# Patient Record
Sex: FEMALE | Race: BLACK OR AFRICAN AMERICAN | Employment: FULL TIME | URBAN - METROPOLITAN AREA
[De-identification: names, ages, dates, MRNs, and addresses within clinical notes are randomized per-mention and may not be internally consistent; named-entity substitution may affect disease eponyms.]

---

## 2017-04-15 DIAGNOSIS — J30.1 SEASONAL ALLERGIC RHINITIS DUE TO POLLEN: Primary | ICD-10-CM

## 2017-04-15 DIAGNOSIS — J30.9 ALLERGIC RHINITIS: ICD-10-CM

## 2017-04-18 RX ORDER — FLUTICASONE PROPIONATE 50 MCG
2 SPRAY, SUSPENSION (ML) NASAL DAILY
Qty: 1 BOTTLE | Refills: 0 | Status: SHIPPED | OUTPATIENT
Start: 2017-04-18 | End: 2017-05-17 | Stop reason: SDUPTHER

## 2017-05-17 ENCOUNTER — OFFICE VISIT (OUTPATIENT)
Dept: FAMILY MEDICINE CLINIC | Age: 47
End: 2017-05-17

## 2017-05-17 VITALS
HEART RATE: 69 BPM | BODY MASS INDEX: 32.47 KG/M2 | DIASTOLIC BLOOD PRESSURE: 78 MMHG | SYSTOLIC BLOOD PRESSURE: 125 MMHG | TEMPERATURE: 97.2 F | OXYGEN SATURATION: 100 % | WEIGHT: 202 LBS | HEIGHT: 66 IN | RESPIRATION RATE: 18 BRPM

## 2017-05-17 DIAGNOSIS — E11.9 TYPE 2 DIABETES MELLITUS WITHOUT COMPLICATION, WITHOUT LONG-TERM CURRENT USE OF INSULIN (HCC): ICD-10-CM

## 2017-05-17 DIAGNOSIS — I10 ESSENTIAL HYPERTENSION, BENIGN: ICD-10-CM

## 2017-05-17 DIAGNOSIS — L30.9 DERMATITIS: ICD-10-CM

## 2017-05-17 DIAGNOSIS — R42 VERTIGO: ICD-10-CM

## 2017-05-17 DIAGNOSIS — J30.1 SEASONAL ALLERGIC RHINITIS DUE TO POLLEN: ICD-10-CM

## 2017-05-17 DIAGNOSIS — N94.6 DYSMENORRHEA: ICD-10-CM

## 2017-05-17 DIAGNOSIS — E11.9 TYPE 2 DIABETES MELLITUS WITHOUT COMPLICATION, WITHOUT LONG-TERM CURRENT USE OF INSULIN (HCC): Primary | ICD-10-CM

## 2017-05-17 LAB — HBA1C MFR BLD HPLC: 6.5 %

## 2017-05-17 RX ORDER — FLUTICASONE PROPIONATE 50 MCG
2 SPRAY, SUSPENSION (ML) NASAL DAILY
Qty: 1 BOTTLE | Refills: 5 | Status: SHIPPED | OUTPATIENT
Start: 2017-05-17 | End: 2018-08-07 | Stop reason: SDUPTHER

## 2017-05-17 RX ORDER — NAPROXEN 500 MG/1
TABLET ORAL
Qty: 60 TAB | Refills: 5 | Status: SHIPPED | OUTPATIENT
Start: 2017-05-17 | End: 2018-06-28 | Stop reason: SDUPTHER

## 2017-05-17 RX ORDER — TRIAMCINOLONE ACETONIDE 1 MG/G
CREAM TOPICAL 2 TIMES DAILY
Qty: 80 G | Refills: 3 | Status: SHIPPED | OUTPATIENT
Start: 2017-05-17 | End: 2017-05-23 | Stop reason: SDUPTHER

## 2017-05-17 RX ORDER — MECLIZINE HCL 12.5 MG 12.5 MG/1
12.5 TABLET ORAL
Qty: 30 TAB | Refills: 1 | Status: SHIPPED | OUTPATIENT
Start: 2017-05-17 | End: 2020-02-12 | Stop reason: SDUPTHER

## 2017-05-17 RX ORDER — LISINOPRIL AND HYDROCHLOROTHIAZIDE 10; 12.5 MG/1; MG/1
TABLET ORAL
Qty: 90 TAB | Refills: 1 | Status: SHIPPED | OUTPATIENT
Start: 2017-05-17 | End: 2017-05-22 | Stop reason: SDUPTHER

## 2017-05-17 NOTE — PROGRESS NOTES
Lisa Standard Peoples 51 y.o. female   Chief Complaint   Patient presents with    Follow-up    Diabetes    Hypertension    Cholesterol Problem    Medication Refill         1. Have you been to the ER, urgent care clinic since your last visit? Hospitalized since your last visit? No    2. Have you seen or consulted any other health care providers outside of the [address] [place] [address] Drive since your last visit? Include any pap smears or colon screening.  No

## 2017-05-22 DIAGNOSIS — I10 ESSENTIAL HYPERTENSION, BENIGN: ICD-10-CM

## 2017-05-22 DIAGNOSIS — L30.9 DERMATITIS: ICD-10-CM

## 2017-05-22 NOTE — TELEPHONE ENCOUNTER
Please resend prescription for Triamcinolone to [facility] .  The pharmacy never received it.

## 2017-05-23 RX ORDER — TRIAMCINOLONE ACETONIDE 1 MG/G
CREAM TOPICAL 2 TIMES DAILY
Qty: 80 G | Refills: 3 | Status: SHIPPED | OUTPATIENT
Start: 2017-05-23 | End: 2018-06-26 | Stop reason: SDUPTHER

## 2017-05-23 RX ORDER — LISINOPRIL AND HYDROCHLOROTHIAZIDE 10; 12.5 MG/1; MG/1
TABLET ORAL
Qty: 90 TAB | Refills: 3 | Status: SHIPPED | OUTPATIENT
Start: 2017-05-23 | End: 2018-03-07 | Stop reason: ALTCHOICE

## 2017-09-05 RX ORDER — ALBUTEROL SULFATE 90 UG/1
AEROSOL, METERED RESPIRATORY (INHALATION)
Qty: 1 INHALER | Refills: 5 | Status: SHIPPED | OUTPATIENT
Start: 2017-09-05 | End: 2020-10-06 | Stop reason: SDUPTHER

## 2017-09-28 ENCOUNTER — OFFICE VISIT (OUTPATIENT)
Dept: OBGYN CLINIC | Age: 47
End: 2017-09-28

## 2017-09-28 VITALS
HEIGHT: 66 IN | SYSTOLIC BLOOD PRESSURE: 143 MMHG | WEIGHT: 214 LBS | DIASTOLIC BLOOD PRESSURE: 74 MMHG | HEART RATE: 83 BPM | BODY MASS INDEX: 34.39 KG/M2

## 2017-09-28 DIAGNOSIS — Z01.419 WELL WOMAN EXAM WITH ROUTINE GYNECOLOGICAL EXAM: Primary | ICD-10-CM

## 2017-09-28 NOTE — MR AVS SNAPSHOT
Visit Information Date & Time Provider Department Dept. Phone Encounter #  
 9/28/2017 10:00 AM Waleska Hiltonshereenuth [id number] Follow-up Instructions Return if symptoms worsen or fail to improve. Upcoming Health Maintenance Date Due INFLUENZA AGE 9 TO ADULT 8/1/2017 PAP AKA CERVICAL CYTOLOGY 8/23/2017 Allergies as of 9/28/2017  Review Complete On: 9/28/2017 By: Ingrid Lara MD  
  
 Severity Noted Reaction Type Reactions Codeine High 08/01/2012    Nausea Only, Other (comments) Sweats felt like she was going to pass out Procardia [Nifedipine] High 06/07/2011    Shortness of Breath, Nausea and Vomiting, Other (comments) Tightness in chest on 7/6/1990 Sulfa (Sulfonamide Antibiotics) High 06/07/2011    Rash Tylox [Oxycodone-acetaminophen] High 08/01/2012    Nausea Only, Other (comments) Sweats, felt like she was going to pass out Current Immunizations  Never Reviewed Name Date Influenza Vaccine 10/16/2015 Influenza Vaccine Split 10/14/2012 Not reviewed this visit You Were Diagnosed With   
  
 Codes Comments Well woman exam with routine gynecological exam    -  Primary ICD-10-CM: Q71.144 ICD-9-CM: V72.31 [V72.31] Vitals BP Pulse Height(growth percentile) Weight(growth percentile) BMI OB Status 143/74 (BP 1 Location: Right arm, BP Patient Position: Sitting) 83 5' 6\" (1.676 m) 214 lb (97.1 kg) 34.54 kg/m2 Having regular periods Smoking Status Never Smoker BMI and BSA Data Body Mass Index Body Surface Area 34.54 kg/m 2 2.13 m 2 Preferred Pharmacy Pharmacy Name Phone [facility] [address] [place] [address] [phone] Your Updated Medication List  
  
   
This list is accurate as of: 9/28/17 10:46 AM.  Always use your most recent med list.  
  
  
  
  
 Blood-Glucose Meter monitoring kit Test blood sugar once a day .00 One Touch Ultra Mini Glucose Meter  
  
 cyanocobalamin 1,000 mcg tablet Take 1,000 mcg by mouth daily. fluticasone 50 mcg/actuation nasal spray Commonly known as:  Genie Lorenzo 2 Sprays by Both Nostrils route daily. glucose blood VI test strips strip Commonly known as:  ASCENSIA AUTODISC VI, ONE TOUCH ULTRA TEST VI Test blood sugar once daily .00 One Touch Ultra Mini Test strip Lancets Misc Use with One Touch Ultra Mini Glucose Meter test blood sugar once daily .00  
  
 lisinopril-hydroCHLOROthiazide 10-12.5 mg per tablet Commonly known as:  PRINZIDE, ZESTORETIC  
TAKE ONE TABLET BY MOUTH ONCE DAILY  
  
 loratadine 10 mg tablet Commonly known as:  Lynda Case Take 1 Tab by mouth daily. meclizine 12.5 mg tablet Commonly known as:  ANTIVERT Take 1 Tab by mouth three (3) times daily as needed. montelukast 10 mg tablet Commonly known as:  SINGULAIR Take 1 Tab by mouth daily. MULTI-VITAMIN PO Take  by mouth daily. naproxen 500 mg tablet Commonly known as:  NAPROSYN  
TAKE ONE TABLET BY MOUTH TWICE DAILY WITH MEALS  
  
 ondansetron 8 mg disintegrating tablet Commonly known as:  ZOFRAN ODT Take 1 Tab by mouth every eight (8) hours as needed for Nausea. phentermine 37.5 mg tablet Commonly known as:  ADIPEX-P Take 1 Tab by mouth every morning. Max Daily Amount: 37.5 mg. PROBIOTIC 4X 10-15 mg Tbec Generic drug:  B.infantis-B.ani-B.long-B.bifi Take  by mouth daily. SITagliptin-metFORMIN  mg per tablet Commonly known as:  Erendira Helm Take 1 Tab by mouth two (2) times daily (with meals). SLOW RELEASE IRON 140 mg (45 mg iron) Tber ER tablet Generic drug:  ferrous sulfate Take  by mouth every other day. triamcinolone acetonide 0.1 % topical cream  
Commonly known as:  KENALOG Apply  to affected area two (2) times a day. use thin layer VENTOLIN HFA 90 mcg/actuation inhaler Generic drug:  albuterol INHALE TWO PUFFS BY MOUTH EVERY 4 HOURS AS NEEDED FOR WHEEZING  
  
 VITAMIN C 500 mg tablet Generic drug:  ascorbic acid (vitamin C) Take 500 mg by mouth daily. Follow-up Instructions Return if symptoms worsen or fail to improve. To-Do List   
 09/28/2017 Imaging:  ABRAM MAMMO BI SCREENING INCL CAD Patient Instructions Abdominal Hysterectomy: What to Expect at [facility] Your Recovery You can expect to feel better and stronger each day, although you may need pain medicine for a week or two. You may get tired easily or have less energy than usual. This may last for several weeks after surgery. You will probably notice that your belly is swollen and puffy. This is common. The swelling will take several weeks to go down. It may take about 4 to 6 weeks to fully recover. It is important to avoid lifting while you are recovering so that you can heal. 
This care sheet gives you a general idea about how long it will take for you to recover. But each person recovers at a different pace. Follow the steps below to get better as quickly as possible. How can you care for yourself at home? Activity · Rest when you feel tired. Getting enough sleep will help you recover. · Try to walk each day. Start by walking a little more than you did the day before. Bit by bit, increase the amount you walk. Walking boosts blood flow and helps prevent pneumonia and constipation. · Avoid lifting anything that would make you strain. This may include a child, heavy grocery bags and milk containers, a heavy briefcase or backpack, cat litter or dog food bags, or a vacuum . · Avoid strenuous activities, such as biking, jogging, weight lifting, or aerobic exercise, until your doctor says it is okay. · You may shower. Pat the cut (incision) dry.  Do not take a bath for the first 2 weeks, or until your doctor tells you it is okay. · Ask your doctor when you can drive again. · You will probably need to take 2 to 4 weeks off from work. It depends on the type of work you do and how you feel. · Your doctor will tell you when you can have sex again. Diet · You can eat your normal diet. If your stomach is upset, try bland, low-fat foods like plain rice, broiled chicken, toast, and yogurt. · Drink plenty of fluids (unless your doctor tells you not to). · You may notice that your bowel movements are not regular right after your surgery. This is common. Try to avoid constipation and straining with bowel movements. You may want to take a fiber supplement every day. If you have not had a bowel movement after a couple of days, ask your doctor about taking a mild laxative. Medicines · Your doctor will tell you if and when you can restart your medicines. He or she will also give you instructions about taking any new medicines. · If you take blood thinners, such as warfarin (Coumadin), clopidogrel (Plavix), or aspirin, be sure to talk to your doctor. He or she will tell you if and when to start taking those medicines again. Make sure that you understand exactly what your doctor wants you to do. · Be safe with medicines. Take pain medicines exactly as directed. ¨ If the doctor gave you a prescription medicine for pain, take it as prescribed. ¨ If you are not taking a prescription pain medicine, ask your doctor if you can take an over-the-counter medicine. · If your doctor prescribed antibiotics, take them as directed. Do not stop taking them just because you feel better. You need to take the full course of antibiotics. · If you think your pain medicine is making you sick to your stomach: 
¨ Take your medicine after meals (unless your doctor has told you not to). ¨ Ask your doctor for a different pain medicine. Incision care · If you have strips of tape on the cut (incision) the doctor made, leave the tape on for a week or until it falls off. Or follow your doctor's instructions for removing the tape. · Wash the area daily with warm, soapy water, and pat it dry. Don't use hydrogen peroxide or alcohol, which can slow healing. You may cover the area with a gauze bandage if it weeps or rubs against clothing. Change the bandage every day. · Keep the area clean and dry. Other instructions · You may have some light vaginal bleeding. Wear sanitary pads if needed. Do not douche or use tampons. Follow-up care is a key part of your treatment and safety. Be sure to make and go to all appointments, and call your doctor if you are having problems. It's also a good idea to know your test results and keep a list of the medicines you take. When should you call for help? Call 911 anytime you think you may need emergency care. For example, call if: 
· You passed out (lost consciousness). · You have sudden chest pain and shortness of breath, or you cough up blood. · You have severe pain in your belly. Call your doctor now or seek immediate medical care if: 
· You have bright red vaginal bleeding that soaks one or more pads in an hour, or you have large clots. · You have foul-smelling discharge from your vagina. · You are sick to your stomach or cannot keep fluids down. · You have signs of infection, such as: 
¨ Increased pain, swelling, warmth, or redness. ¨ Red streaks leading from the incision. ¨ Pus draining from the incision. ¨ A fever. · You have pain that does not get better after you take pain medicine. · You have loose stitches, or your incision comes open. · You have signs of a blood clot, such as: 
¨ Pain in your calf, back of knee, thigh, or groin. ¨ Redness and swelling in your leg or groin. · You have trouble passing urine or stool, especially if you have pain or swelling in your lower belly. · You have hot flashes, sweating, flushing, or a fast or pounding heartbeat. Watch closely for changes in your health, and be sure to contact your doctor if: 
· You do not have a bowel movement after taking a laxative. Where can you learn more? Go to http://samuel-kelsey.info/. Enter M280 in the search box to learn more about \"Abdominal Hysterectomy: What to Expect at Home. \" Current as of: October 13, 2016 Content Version: 11.3 © 2316-5394 [employer]. Care instructions adapted under license by [employer] (which disclaims liability or warranty for this information). If you have questions about a medical condition or this instruction, always ask your healthcare professional. [address] any warranty or liability for your use of this information. Well Visit, Ages 25 to 48: Care Instructions Your Care Instructions Physical exams can help you stay healthy. Your doctor has checked your overall health and may have suggested ways to take good care of yourself. He or she also may have recommended tests. At home, you can help prevent illness with healthy eating, regular exercise, and other steps. Follow-up care is a key part of your treatment and safety. Be sure to make and go to all appointments, and call your doctor if you are having problems. It's also a good idea to know your test results and keep a list of the medicines you take. How can you care for yourself at home? · Reach and stay at a healthy weight. This will lower your risk for many problems, such as obesity, diabetes, heart disease, and high blood pressure. · Get at least 30 minutes of physical activity on most days of the week. Walking is a good choice. You also may want to do other activities, such as running, swimming, cycling, or playing tennis or team sports. Discuss any changes in your exercise program with your doctor. · Do not smoke or allow others to smoke around you. If you need help quitting, talk to your doctor about stop-smoking programs and medicines. These can increase your chances of quitting for good. · Talk to your doctor about whether you have any risk factors for sexually transmitted infections (STIs). Having one sex partner (who does not have STIs and does not have sex with anyone else) is a good way to avoid these infections. · Use birth control if you do not want to have children at this time. Talk with your doctor about the choices available and what might be best for you. · Protect your skin from too much sun. When you're outdoors from 10 a.m. to 4 p.m., stay in the shade or cover up with clothing and a hat with a wide brim. Wear sunglasses that block UV rays. Even when it's cloudy, put broad-spectrum sunscreen (SPF 30 or higher) on any exposed skin. · See a dentist one or two times a year for checkups and to have your teeth cleaned. · Wear a seat belt in the car. · Drink alcohol in moderation, if at all. That means no more than 2 drinks a day for men and 1 drink a day for women. Follow your doctor's advice about when to have certain tests. These tests can spot problems early. For everyone · Cholesterol. Have the fat (cholesterol) in your blood tested after age 21. Your doctor will tell you how often to have this done based on your age, family history, or other things that can increase your risk for heart disease. · Blood pressure. Have your blood pressure checked during a routine doctor visit. Your doctor will tell you how often to check your blood pressure based on your age, your blood pressure results, and other factors. · Vision. Talk with your doctor about how often to have a glaucoma test. 
· Diabetes. Ask your doctor whether you should have tests for diabetes. · Colon cancer. Have a test for colon cancer at age 48.  You may have one of several tests. If you are younger than 48, you may need a test earlier if you have any risk factors. Risk factors include whether you already had a precancerous polyp removed from your colon or whether your parent, brother, sister, or child has had colon cancer. For women · Breast exam and mammogram. Talk to your doctor about when you should have a clinical breast exam and a mammogram. Medical experts differ on whether and how often women under 50 should have these tests. Your doctor can help you decide what is right for you. · Pap test and pelvic exam. Begin Pap tests at age 24. A Pap test is the best way to find cervical cancer. The test often is part of a pelvic exam. Ask how often to have this test. 
· Tests for sexually transmitted infections (STIs). Ask whether you should have tests for STIs. You may be at risk if you have sex with more than one person, especially if your partners do not wear condoms. For men · Tests for sexually transmitted infections (STIs). Ask whether you should have tests for STIs. You may be at risk if you have sex with more than one person, especially if you do not wear a condom. · Testicular cancer exam. Ask your doctor whether you should check your testicles regularly. · Prostate exam. Talk to your doctor about whether you should have a blood test (called a PSA test) for prostate cancer. Experts differ on whether and when men should have this test. Some experts suggest it if you are older than 39 and are -American or have a father or brother who got prostate cancer when he was younger than 72. When should you call for help? Watch closely for changes in your health, and be sure to contact your doctor if you have any problems or symptoms that concern you. Where can you learn more? Go to http://samuel-kelsey.info/. Enter P072 in the search box to learn more about \"Well Visit, Ages 25 to 48: Care Instructions. \" Current as of: July 19, 2016 Content Version: 11.3 © 3098-2505 [employer]. Care instructions adapted under license by [employer] (which disclaims liability or warranty for this information). If you have questions about a medical condition or this instruction, always ask your healthcare professional. [address] any warranty or liability for your use of this information. Introducing [facility]! Dear Lilian Apo: 
Thank you for requesting a Raidarrr account. Our records indicate that you already have an active Raidarrr account. You can access your account anytime at [email or web address] Did you know that you can access your hospital and ER discharge instructions at any time in Raidarrr? You can also review all of your test results from your hospital stay or ER visit. Additional Information If you have questions, please visit the Frequently Asked Questions section of the Raidarrr website at [email or web address] Remember, Raidarrr is NOT to be used for urgent needs. For medical emergencies, dial 911. Now available from your iPhone and Android! Please provide this summary of care documentation to your next provider. Your primary care clinician is listed as Ilana Leal. If you have any questions after today's visit, please call [phone].

## 2017-09-28 NOTE — PROGRESS NOTES
Subjective:   51 y.o. female for Well Woman Check. No LMP recorded. Hx of DUB secondary to fibroids. Social History: single partner, contraception - tubal ligation. Pertinent past medical history: hypertension, no history of DVT, CAD, DM, liver disease, migraines or smoking. ROS:  Feeling well. No dyspnea or chest pain on exertion. No abdominal pain, change in bowel habits, black or bloody stools. No urinary tract symptoms. GYN ROS: no breast pain or new or enlarging lumps on self exam, she complains of menorrhagia. No neurological complaints. Objective:     Visit Vitals    /74 (BP 1 Location: Right arm, BP Patient Position: Sitting)    Pulse 83    Ht 5' 6\" (1.676 m)    Wt 214 lb (97.1 kg)    BMI 34.54 kg/m2     The patient appears well, alert, oriented x 3, in no distress. ENT normal.  Neck supple. No adenopathy or thyromegaly. MAVIS. Lungs are clear, good air entry, no wheezes, rhonchi or rales. S1 and S2 normal, no murmurs, regular rate and rhythm. Abdomen soft without tenderness, guarding, mass or organomegaly. Extremities show no edema, normal peripheral pulses. Neurological is normal, no focal findings. BREAST EXAM: breasts appear normal, no suspicious masses, no skin or nipple changes or axillary nodes    PELVIC EXAM: VULVA: normal appearing vulva with no masses, tenderness or lesions, VAGINA: normal appearing vagina with normal color and discharge, no lesions, CERVIX: normal appearing cervix without discharge or lesions, UTERUS: retroverted, enlarged to 10-12 week's size, irregular, mobile, ADNEXA: normal adnexa in size, nontender and no masses    Assessment/Plan:   well woman  mammogram  pap smear  return annually or prn  .

## 2017-09-28 NOTE — PATIENT INSTRUCTIONS
Abdominal Hysterectomy: What to Expect at [address] [place] [address] can expect to feel better and stronger each day, although you may need pain medicine for a week or two. You may get tired easily or have less energy than usual. This may last for several weeks after surgery. You will probably notice that your belly is swollen and puffy. This is common. The swelling will take several weeks to go down. It may take about 4 to 6 weeks to fully recover. It is important to avoid lifting while you are recovering so that you can heal.  This care sheet gives you a general idea about how long it will take for you to recover. But each person recovers at a different pace. Follow the steps below to get better as quickly as possible. How can you care for yourself at home? Activity  · Rest when you feel tired. Getting enough sleep will help you recover. · Try to walk each day. Start by walking a little more than you did the day before. Bit by bit, increase the amount you walk. Walking boosts blood flow and helps prevent pneumonia and constipation. · Avoid lifting anything that would make you strain. This may include a child, heavy grocery bags and milk containers, a heavy briefcase or backpack, cat litter or dog food bags, or a vacuum . · Avoid strenuous activities, such as biking, jogging, weight lifting, or aerobic exercise, until your doctor says it is okay. · You may shower. Pat the cut (incision) dry. Do not take a bath for the first 2 weeks, or until your doctor tells you it is okay. · Ask your doctor when you can drive again. · You will probably need to take 2 to 4 weeks off from work. It depends on the type of work you do and how you feel. · Your doctor will tell you when you can have sex again. Diet  · You can eat your normal diet. If your stomach is upset, try bland, low-fat foods like plain rice, broiled chicken, toast, and yogurt.   · Drink plenty of fluids (unless your doctor tells you not to).  · You may notice that your bowel movements are not regular right after your surgery. This is common. Try to avoid constipation and straining with bowel movements. You may want to take a fiber supplement every day. If you have not had a bowel movement after a couple of days, ask your doctor about taking a mild laxative. Medicines  · Your doctor will tell you if and when you can restart your medicines. He or she will also give you instructions about taking any new medicines. · If you take blood thinners, such as warfarin (Coumadin), clopidogrel (Plavix), or aspirin, be sure to talk to your doctor. He or she will tell you if and when to start taking those medicines again. Make sure that you understand exactly what your doctor wants you to do. · Be safe with medicines. Take pain medicines exactly as directed. ¨ If the doctor gave you a prescription medicine for pain, take it as prescribed. ¨ If you are not taking a prescription pain medicine, ask your doctor if you can take an over-the-counter medicine. · If your doctor prescribed antibiotics, take them as directed. Do not stop taking them just because you feel better. You need to take the full course of antibiotics. · If you think your pain medicine is making you sick to your stomach:  ¨ Take your medicine after meals (unless your doctor has told you not to). ¨ Ask your doctor for a different pain medicine. Incision care  · If you have strips of tape on the cut (incision) the doctor made, leave the tape on for a week or until it falls off. Or follow your doctor's instructions for removing the tape. · Wash the area daily with warm, soapy water, and pat it dry. Don't use hydrogen peroxide or alcohol, which can slow healing. You may cover the area with a gauze bandage if it weeps or rubs against clothing. Change the bandage every day. · Keep the area clean and dry. Other instructions  · You may have some light vaginal bleeding.  Wear sanitary pads if needed. Do not douche or use tampons. Follow-up care is a key part of your treatment and safety. Be sure to make and go to all appointments, and call your doctor if you are having problems. It's also a good idea to know your test results and keep a list of the medicines you take. When should you call for help? Call 911 anytime you think you may need emergency care. For example, call if:  · You passed out (lost consciousness). · You have sudden chest pain and shortness of breath, or you cough up blood. · You have severe pain in your belly. Call your doctor now or seek immediate medical care if:  · You have bright red vaginal bleeding that soaks one or more pads in an hour, or you have large clots. · You have foul-smelling discharge from your vagina. · You are sick to your stomach or cannot keep fluids down. · You have signs of infection, such as:  ¨ Increased pain, swelling, warmth, or redness. ¨ Red streaks leading from the incision. ¨ Pus draining from the incision. ¨ A fever. · You have pain that does not get better after you take pain medicine. · You have loose stitches, or your incision comes open. · You have signs of a blood clot, such as:  ¨ Pain in your calf, back of knee, thigh, or groin. ¨ Redness and swelling in your leg or groin. · You have trouble passing urine or stool, especially if you have pain or swelling in your lower belly. · You have hot flashes, sweating, flushing, or a fast or pounding heartbeat. Watch closely for changes in your health, and be sure to contact your doctor if:  · You do not have a bowel movement after taking a laxative. Where can you learn more? Go to http://samuel-kelsey.info/. Enter M280 in the search box to learn more about \"Abdominal Hysterectomy: What to Expect at Home. \"  Current as of: October 13, 2016  Content Version: 11.3  © 9434-3814 [employer].  Care instructions adapted under license by [employer] (which disclaims liability or warranty for this information). If you have questions about a medical condition or this instruction, always ask your healthcare professional. [address] any warranty or liability for your use of this information. Well Visit, Ages 25 to 48: Care Instructions  Your Care Instructions  Physical exams can help you stay healthy. Your doctor has checked your overall health and may have suggested ways to take good care of yourself. He or she also may have recommended tests. At home, you can help prevent illness with healthy eating, regular exercise, and other steps. Follow-up care is a key part of your treatment and safety. Be sure to make and go to all appointments, and call your doctor if you are having problems. It's also a good idea to know your test results and keep a list of the medicines you take. How can you care for yourself at home? · Reach and stay at a healthy weight. This will lower your risk for many problems, such as obesity, diabetes, heart disease, and high blood pressure. · Get at least 30 minutes of physical activity on most days of the week. Walking is a good choice. You also may want to do other activities, such as running, swimming, cycling, or playing tennis or team sports. Discuss any changes in your exercise program with your doctor. · Do not smoke or allow others to smoke around you. If you need help quitting, talk to your doctor about stop-smoking programs and medicines. These can increase your chances of quitting for good. · Talk to your doctor about whether you have any risk factors for sexually transmitted infections (STIs). Having one sex partner (who does not have STIs and does not have sex with anyone else) is a good way to avoid these infections. · Use birth control if you do not want to have children at this time. Talk with your doctor about the choices available and what might be best for you.   · Protect your skin from too much sun. When you're outdoors from 10 a.m. to 4 p.m., stay in the shade or cover up with clothing and a hat with a wide brim. Wear sunglasses that block UV rays. Even when it's cloudy, put broad-spectrum sunscreen (SPF 30 or higher) on any exposed skin. · See a dentist one or two times a year for checkups and to have your teeth cleaned. · Wear a seat belt in the car. · Drink alcohol in moderation, if at all. That means no more than 2 drinks a day for men and 1 drink a day for women. Follow your doctor's advice about when to have certain tests. These tests can spot problems early. For everyone  · Cholesterol. Have the fat (cholesterol) in your blood tested after age 21. Your doctor will tell you how often to have this done based on your age, family history, or other things that can increase your risk for heart disease. · Blood pressure. Have your blood pressure checked during a routine doctor visit. Your doctor will tell you how often to check your blood pressure based on your age, your blood pressure results, and other factors. · Vision. Talk with your doctor about how often to have a glaucoma test.  · Diabetes. Ask your doctor whether you should have tests for diabetes. · Colon cancer. Have a test for colon cancer at age 48. You may have one of several tests. If you are younger than 48, you may need a test earlier if you have any risk factors. Risk factors include whether you already had a precancerous polyp removed from your colon or whether your parent, brother, sister, or child has had colon cancer. For women  · Breast exam and mammogram. Talk to your doctor about when you should have a clinical breast exam and a mammogram. Medical experts differ on whether and how often women under 50 should have these tests. Your doctor can help you decide what is right for you. · Pap test and pelvic exam. Begin Pap tests at age 24. A Pap test is the best way to find cervical cancer.  The test often is part of a pelvic exam. Ask how often to have this test.  · Tests for sexually transmitted infections (STIs). Ask whether you should have tests for STIs. You may be at risk if you have sex with more than one person, especially if your partners do not wear condoms. For men  · Tests for sexually transmitted infections (STIs). Ask whether you should have tests for STIs. You may be at risk if you have sex with more than one person, especially if you do not wear a condom. · Testicular cancer exam. Ask your doctor whether you should check your testicles regularly. · Prostate exam. Talk to your doctor about whether you should have a blood test (called a PSA test) for prostate cancer. Experts differ on whether and when men should have this test. Some experts suggest it if you are older than 39 and are -American or have a father or brother who got prostate cancer when he was younger than 72. When should you call for help? Watch closely for changes in your health, and be sure to contact your doctor if you have any problems or symptoms that concern you. Where can you learn more? Go to http://samuel-kelsey.info/. Enter P072 in the search box to learn more about \"Well Visit, Ages 25 to 48: Care Instructions. \"  Current as of: July 19, 2016  Content Version: 11.3  © 0456-6231 [employer]. Care instructions adapted under license by [employer] (which disclaims liability or warranty for this information). If you have questions about a medical condition or this instruction, always ask your healthcare professional. [place] [address] any warranty or liability for your use of this information.

## 2017-10-03 LAB — PAP IMAGE GUIDED, 8900296: NORMAL

## 2017-10-12 ENCOUNTER — HOSPITAL ENCOUNTER (OUTPATIENT)
Dept: MAMMOGRAPHY | Age: 47
Discharge: HOME | End: 2017-10-12
Attending: OBSTETRICS & GYNECOLOGY
Payer: COMMERCIAL

## 2017-10-12 DIAGNOSIS — Z01.419 WELL WOMAN EXAM WITH ROUTINE GYNECOLOGICAL EXAM: ICD-10-CM

## 2017-10-12 PROCEDURE — 77067 SCR MAMMO BI INCL CAD: CPT

## 2017-11-12 DIAGNOSIS — E11.9 TYPE 2 DIABETES MELLITUS WITHOUT COMPLICATION, WITHOUT LONG-TERM CURRENT USE OF INSULIN (HCC): ICD-10-CM

## 2017-11-12 RX ORDER — SITAGLIPTIN AND METFORMIN HYDROCHLORIDE 500; 50 MG/1; MG/1
TABLET, FILM COATED ORAL
Qty: 180 TAB | Refills: 1 | Status: SHIPPED | OUTPATIENT
Start: 2017-11-12 | End: 2018-05-16 | Stop reason: SDUPTHER

## 2017-11-16 ENCOUNTER — HOSPITAL ENCOUNTER (OUTPATIENT)
Dept: LAB | Age: 47
Discharge: HOME | End: 2017-11-16

## 2017-11-16 LAB — SENTARA SPECIMEN COL,SENBCF: NORMAL

## 2017-11-16 PROCEDURE — 99001 SPECIMEN HANDLING PT-LAB: CPT | Performed by: FAMILY MEDICINE

## 2017-11-17 LAB
A-G RATIO,AGRAT: 1.3 RATIO (ref 1.1–2.6)
ALBUMIN SERPL-MCNC: 3.7 G/DL (ref 3.5–5)
ALP SERPL-CCNC: 94 U/L (ref 25–115)
ALT SERPL-CCNC: 24 U/L (ref 5–40)
ANION GAP SERPL CALC-SCNC: 15 MMOL/L
AST SERPL W P-5'-P-CCNC: 19 U/L (ref 10–37)
BILIRUB SERPL-MCNC: <0.1 MG/DL (ref 0.2–1.2)
BUN SERPL-MCNC: 14 MG/DL (ref 6–22)
CALCIUM SERPL-MCNC: 9.1 MG/DL (ref 8.4–10.5)
CHLORIDE SERPL-SCNC: 102 MMOL/L (ref 98–110)
CHOLEST SERPL-MCNC: 200 MG/DL (ref 110–200)
CO2 SERPL-SCNC: 23 MMOL/L (ref 20–32)
CREAT SERPL-MCNC: 0.8 MG/DL (ref 0.5–1.2)
GFRAA, 66117: >60
GFRNA, 66118: >60
GLOBULIN,GLOB: 2.8 G/DL (ref 2–4)
GLUCOSE SERPL-MCNC: 195 MG/DL (ref 70–99)
HDLC SERPL-MCNC: 52 MG/DL (ref 40–59)
LDLC SERPL CALC-MCNC: 115 MG/DL (ref 50–99)
POTASSIUM SERPL-SCNC: 3.8 MMOL/L (ref 3.5–5.5)
PROT SERPL-MCNC: 6.5 G/DL (ref 6.4–8.3)
SODIUM SERPL-SCNC: 140 MMOL/L (ref 133–145)
TRIGL SERPL-MCNC: 167 MG/DL (ref 40–149)
VLDLC SERPL CALC-MCNC: 33 MG/DL (ref 8–30)

## 2017-12-07 ENCOUNTER — OFFICE VISIT (OUTPATIENT)
Dept: FAMILY MEDICINE CLINIC | Age: 47
End: 2017-12-07

## 2017-12-07 VITALS
HEIGHT: 66 IN | RESPIRATION RATE: 14 BRPM | WEIGHT: 218 LBS | OXYGEN SATURATION: 100 % | SYSTOLIC BLOOD PRESSURE: 144 MMHG | BODY MASS INDEX: 35.03 KG/M2 | HEART RATE: 66 BPM | DIASTOLIC BLOOD PRESSURE: 80 MMHG | TEMPERATURE: 97.2 F

## 2017-12-07 DIAGNOSIS — I10 ESSENTIAL HYPERTENSION: ICD-10-CM

## 2017-12-07 DIAGNOSIS — J01.00 ACUTE NON-RECURRENT MAXILLARY SINUSITIS: ICD-10-CM

## 2017-12-07 DIAGNOSIS — E78.2 MIXED HYPERLIPIDEMIA: ICD-10-CM

## 2017-12-07 DIAGNOSIS — E11.9 TYPE 2 DIABETES MELLITUS WITHOUT COMPLICATION, WITHOUT LONG-TERM CURRENT USE OF INSULIN (HCC): ICD-10-CM

## 2017-12-07 DIAGNOSIS — E11.9 TYPE 2 DIABETES MELLITUS WITHOUT COMPLICATION, WITHOUT LONG-TERM CURRENT USE OF INSULIN (HCC): Primary | ICD-10-CM

## 2017-12-07 RX ORDER — AMOXICILLIN AND CLAVULANATE POTASSIUM 875; 125 MG/1; MG/1
1 TABLET, FILM COATED ORAL 2 TIMES DAILY
Qty: 20 TAB | Refills: 0 | Status: SHIPPED | OUTPATIENT
Start: 2017-12-07 | End: 2017-12-17

## 2017-12-07 NOTE — PROGRESS NOTES
Saman SIMMONS Peoples 49 y.o. female   Chief Complaint   Patient presents with    Follow-up    Diabetes    Hypertension    Cholesterol Problem    Labs     Completed on 11-16-17         1. Have you been to the ER, urgent care clinic since your last visit? Hospitalized since your last visit? No    2. Have you seen or consulted any other health care providers outside of the [address] [place] [address] since your last visit? Include any pap smears or colon screening.  No

## 2017-12-07 NOTE — PROGRESS NOTES
HISTORY OF PRESENT ILLNESS  Tona Annaece is a 49 y.o. female. Chief Complaint   Patient presents with    Follow-up    Diabetes    Hypertension    Cholesterol Problem    Labs     Completed on 11-16-17       HPI  Patient is here for a follow up of Htn, DM, and lipids. Home readings are usually 90s in the am.      Patient had labs on 11-16-17. Labs reviewed in detail with patient     Patient does not need medication refills today. New concerns today: pt reports that she had vertigo last week. She has been having sinus issues as well. Pt had a fever last night. She has had congestion and her ears felt \"stopped up\" when the vertigo occurred. She does not have ha or face pain currently but did prior to the vertigo. She is not having vertigo now but feels a little congested at times. Review of Systems   HENT: Positive for congestion and sinus pain. Respiratory: Negative. Cardiovascular: Negative. Skin: Negative. Neurological: Positive for dizziness and headaches. Physical Exam   Constitutional: She is oriented to person, place, and time. She appears well-developed and well-nourished. No distress. HENT:   Head: Normocephalic and atraumatic. Right Ear: Hearing, tympanic membrane, external ear and ear canal normal.   Left Ear: Hearing, tympanic membrane, external ear and ear canal normal.   Nose: Mucosal edema present. Right sinus exhibits maxillary sinus tenderness. Right sinus exhibits no frontal sinus tenderness. Left sinus exhibits maxillary sinus tenderness. Left sinus exhibits no frontal sinus tenderness. Mouth/Throat: Uvula is midline, oropharynx is clear and moist and mucous membranes are normal.   Eyes: Conjunctivae and EOM are normal.   Neck: Normal range of motion. Neck supple. No JVD present. No thyromegaly present. Cardiovascular: Normal rate, regular rhythm, normal heart sounds and intact distal pulses. Exam reveals no gallop and no friction rub.     No murmur heard.  Pulmonary/Chest: Effort normal and breath sounds normal. She has no wheezes. She has no rhonchi. She has no rales. Musculoskeletal: Normal range of motion. Lymphadenopathy:     She has no cervical adenopathy. Neurological: She is alert and oriented to person, place, and time. Coordination normal.   Skin: Skin is warm and dry. Psychiatric: She has a normal mood and affect. Her behavior is normal. Judgment and thought content normal.   Nursing note and vitals reviewed. ASSESSMENT and PLAN  Diagnoses and all orders for this visit:    1. Type 2 diabetes mellitus without complication, without long-term current use of insulin (HCC)  -     MICROALBUMIN, UR, RAND W/ MICROALBUMIN/CREA RATIO; Future  -     METABOLIC PANEL, COMPREHENSIVE; Future  -     LIPID PANEL; Future  -     HEMOGLOBIN A1C WITH EAG; Future  Stable, cont pres tx plan. 2. Essential hypertension  -     METABOLIC PANEL, COMPREHENSIVE; Future  -     LIPID PANEL; Future  Work on tlc. 3. Mixed hyperlipidemia  -     METABOLIC PANEL, COMPREHENSIVE; Future  -     LIPID PANEL; Future  Work on tlc. 4. Acute non-recurrent maxillary sinusitis  -     amoxicillin-clavulanate (AUGMENTIN) 875-125 mg per tablet; Take 1 Tab by mouth two (2) times a day for 10 days.       Follow-up Disposition: 3 months; sooner prn

## 2018-01-10 ENCOUNTER — HOSPITAL ENCOUNTER (OUTPATIENT)
Dept: LAB | Age: 48
Discharge: HOME | End: 2018-01-10

## 2018-01-10 LAB — SENTARA SPECIMEN COL,SENBCF: NORMAL

## 2018-01-10 PROCEDURE — 99001 SPECIMEN HANDLING PT-LAB: CPT | Performed by: FAMILY MEDICINE

## 2018-01-11 LAB
CREATININE, URINE: 96 MG/DL
MICROALB/CREAT RATIO, 140286: NORMAL MCG/MG OF CREATININE (ref 0–30)
MICROALBUMIN,URINE RANDOM 140054: <12 UG/ML (ref 0.1–17)

## 2018-02-07 ENCOUNTER — OFFICE VISIT (OUTPATIENT)
Dept: FAMILY MEDICINE CLINIC | Age: 48
End: 2018-02-07

## 2018-02-07 VITALS
HEIGHT: 66 IN | SYSTOLIC BLOOD PRESSURE: 146 MMHG | TEMPERATURE: 98.1 F | HEART RATE: 74 BPM | RESPIRATION RATE: 14 BRPM | WEIGHT: 216 LBS | BODY MASS INDEX: 34.72 KG/M2 | DIASTOLIC BLOOD PRESSURE: 80 MMHG

## 2018-02-07 DIAGNOSIS — Z51.81 MEDICATION MONITORING ENCOUNTER: ICD-10-CM

## 2018-02-07 RX ORDER — PHENTERMINE HYDROCHLORIDE 37.5 MG/1
37.5 TABLET ORAL
Qty: 30 TAB | Refills: 0 | Status: SHIPPED | OUTPATIENT
Start: 2018-02-07 | End: 2018-03-07 | Stop reason: SDUPTHER

## 2018-02-07 NOTE — PROGRESS NOTES
Ronda SIMMONS Peoples 49 y.o. female   Chief Complaint   Patient presents with    Weight Management     restart medication    Medication Refill         1. Have you been to the ER, urgent care clinic since your last visit? Hospitalized since your last visit? No    2. Have you seen or consulted any other health care providers outside of the [address] [place] [address] since your last visit? Include any pap smears or colon screening.  No

## 2018-02-07 NOTE — PROGRESS NOTES
HISTORY OF PRESENT ILLNESS  Shana Roy is a 49 y.o. female. Chief Complaint   Patient presents with    Weight Management     restart medication    Medication Refill       HPI  Patient is here to restart phentermine for weight management. Pt reports that her recent bp readings have been wnl. She did take a sinus medication today and has been up since 1am because she works nights. She plans to cont to prep her meals to eat healthy throughout the week. She is doing an exercise challenge that will last for 2 wks; after that she will start another one. She is able to do this at home. She has accountability partners that all report in when they have completed the challenge for the day. Patient does need medication refills today. Patient requests printed refills. New concerns today: none      ROS  Review of Systems   Constitutional: Negative. HENT: Negative. Respiratory: Negative. Cardiovascular: Negative. All other systems reviewed and are negative. Physical Exam  Physical Exam   Nursing note and vitals reviewed. Constitutional: She is oriented to person, place, and time. She appears well-developed and well-nourished. HENT:   Head: Normocephalic and atraumatic. Right Ear: External ear normal.   Left Ear: External ear normal.   Nose: Nose normal.   Eyes: Conjunctivae and EOM are normal.   Neck: Normal range of motion. Neck supple. No JVD present. Carotid bruit is not present. No thyromegaly present. Cardiovascular: Normal rate, regular rhythm, normal heart sounds and intact distal pulses. Exam reveals no gallop and no friction rub. No murmur heard. Pulmonary/Chest: Effort normal and breath sounds normal. She has no wheezes. She has no rhonchi. She has no rales. Abdominal: Soft. Bowel sounds are normal.   Musculoskeletal: Normal range of motion. Neurological: She is alert and oriented to person, place, and time. Coordination normal.   Skin: Skin is warm and dry. Psychiatric: She has a normal mood and affect. Her behavior is normal. Judgment and thought content normal.     ASSESSMENT and PLAN  Diagnoses and all orders for this visit:    1. BMI 34.0-34.9,adult  -     phentermine (ADIPEX-P) 37.5 mg tablet; Take 1 Tab by mouth every morning. Max Daily Amount: 37.5 mg. Cont diet and exercise. 2. Medication monitoring encounter  -     phentermine (ADIPEX-P) 37.5 mg tablet; Take 1 Tab by mouth every morning. Max Daily Amount: 37.5 mg.       Follow-up Disposition: 1 month; sooner prn

## 2018-03-02 ENCOUNTER — HOSPITAL ENCOUNTER (OUTPATIENT)
Dept: LAB | Age: 48
Discharge: HOME | End: 2018-03-02

## 2018-03-02 LAB — SENTARA SPECIMEN COL,SENBCF: NORMAL

## 2018-03-02 PROCEDURE — 99001 SPECIMEN HANDLING PT-LAB: CPT | Performed by: FAMILY MEDICINE

## 2018-03-03 LAB
A-G RATIO,AGRAT: 1.6 RATIO (ref 1.1–2.6)
ALBUMIN SERPL-MCNC: 4.2 G/DL (ref 3.5–5)
ALP SERPL-CCNC: 103 U/L (ref 25–115)
ALT SERPL-CCNC: 15 U/L (ref 5–40)
ANION GAP SERPL CALC-SCNC: 13 MMOL/L
AST SERPL W P-5'-P-CCNC: 13 U/L (ref 10–37)
AVG GLU, 10930: 185 MG/DL (ref 91–123)
BILIRUB SERPL-MCNC: <0.1 MG/DL (ref 0.2–1.2)
BUN SERPL-MCNC: 13 MG/DL (ref 6–22)
CALCIUM SERPL-MCNC: 9.2 MG/DL (ref 8.4–10.5)
CHLORIDE SERPL-SCNC: 92 MMOL/L (ref 98–110)
CHOLEST SERPL-MCNC: 184 MG/DL (ref 110–200)
CO2 SERPL-SCNC: 24 MMOL/L (ref 20–32)
CREAT SERPL-MCNC: 1.1 MG/DL (ref 0.5–1.2)
GFRAA, 66117: >60
GFRNA, 66118: 50.9
GLOBULIN,GLOB: 2.7 G/DL (ref 2–4)
GLUCOSE SERPL-MCNC: 136 MG/DL (ref 70–99)
HBA1C MFR BLD HPLC: 8.1 % (ref 4.8–5.9)
HDLC SERPL-MCNC: 3.8 MG/DL (ref 0–5)
HDLC SERPL-MCNC: 49 MG/DL (ref 40–59)
LDLC SERPL CALC-MCNC: 113 MG/DL (ref 50–99)
POTASSIUM SERPL-SCNC: 4.2 MMOL/L (ref 3.5–5.5)
PROT SERPL-MCNC: 6.9 G/DL (ref 6.4–8.3)
SODIUM SERPL-SCNC: 129 MMOL/L (ref 133–145)
TRIGL SERPL-MCNC: 110 MG/DL (ref 40–149)
VLDLC SERPL CALC-MCNC: 22 MG/DL (ref 8–30)

## 2018-03-07 ENCOUNTER — OFFICE VISIT (OUTPATIENT)
Dept: FAMILY MEDICINE CLINIC | Age: 48
End: 2018-03-07

## 2018-03-07 VITALS
RESPIRATION RATE: 18 BRPM | WEIGHT: 209 LBS | BODY MASS INDEX: 33.59 KG/M2 | SYSTOLIC BLOOD PRESSURE: 148 MMHG | HEIGHT: 66 IN | HEART RATE: 100 BPM | DIASTOLIC BLOOD PRESSURE: 80 MMHG | OXYGEN SATURATION: 100 % | TEMPERATURE: 97.4 F

## 2018-03-07 DIAGNOSIS — R11.0 NAUSEA: ICD-10-CM

## 2018-03-07 DIAGNOSIS — I10 ESSENTIAL HYPERTENSION: ICD-10-CM

## 2018-03-07 DIAGNOSIS — E78.2 MIXED HYPERLIPIDEMIA: ICD-10-CM

## 2018-03-07 DIAGNOSIS — E11.9 TYPE 2 DIABETES MELLITUS WITHOUT COMPLICATION, WITHOUT LONG-TERM CURRENT USE OF INSULIN (HCC): Primary | ICD-10-CM

## 2018-03-07 DIAGNOSIS — Z51.81 MEDICATION MONITORING ENCOUNTER: ICD-10-CM

## 2018-03-07 RX ORDER — LISINOPRIL 20 MG/1
20 TABLET ORAL DAILY
Qty: 30 TAB | Refills: 5 | Status: SHIPPED | OUTPATIENT
Start: 2018-03-07 | End: 2018-04-09 | Stop reason: SINTOL

## 2018-03-07 RX ORDER — ONDANSETRON 8 MG/1
8 TABLET, ORALLY DISINTEGRATING ORAL
Qty: 12 TAB | Refills: 3 | Status: SHIPPED | OUTPATIENT
Start: 2018-03-07 | End: 2021-07-29 | Stop reason: SDUPTHER

## 2018-03-07 RX ORDER — PHENTERMINE HYDROCHLORIDE 37.5 MG/1
37.5 TABLET ORAL
Qty: 30 TAB | Refills: 0 | Status: SHIPPED | OUTPATIENT
Start: 2018-03-07 | End: 2018-07-09 | Stop reason: ALTCHOICE
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
## 2018-03-07 NOTE — PROGRESS NOTES
Michael Sis E Peoples 49 y.o. female   Chief Complaint   Patient presents with    Follow-up     3 month f/u    Hypertension    Diabetes     Type 2, testing glucose once daily.  Cholesterol Problem    Labs     completed on 3-2-18    Weight Management     1 month f/u    Medication Refill         1. Have you been to the ER, urgent care clinic since your last visit? Hospitalized since your last visit? No    2. Have you seen or consulted any other health care providers outside of the [address] [place] [address] since your last visit? Include any pap smears or colon screening.  No

## 2018-04-09 ENCOUNTER — OFFICE VISIT (OUTPATIENT)
Dept: FAMILY MEDICINE CLINIC | Age: 48
End: 2018-04-09

## 2018-04-09 VITALS
TEMPERATURE: 98.4 F | WEIGHT: 210 LBS | BODY MASS INDEX: 33.75 KG/M2 | SYSTOLIC BLOOD PRESSURE: 143 MMHG | HEIGHT: 66 IN | OXYGEN SATURATION: 100 % | RESPIRATION RATE: 18 BRPM | DIASTOLIC BLOOD PRESSURE: 86 MMHG | HEART RATE: 91 BPM

## 2018-04-09 DIAGNOSIS — E11.9 TYPE 2 DIABETES MELLITUS WITHOUT COMPLICATION, WITHOUT LONG-TERM CURRENT USE OF INSULIN (HCC): ICD-10-CM

## 2018-04-09 DIAGNOSIS — I10 ESSENTIAL HYPERTENSION: ICD-10-CM

## 2018-04-09 DIAGNOSIS — I10 ESSENTIAL HYPERTENSION: Primary | ICD-10-CM

## 2018-04-09 RX ORDER — LISINOPRIL AND HYDROCHLOROTHIAZIDE 10; 12.5 MG/1; MG/1
TABLET ORAL
Qty: 30 TAB | Refills: 5 | Status: SHIPPED | OUTPATIENT
Start: 2018-04-09 | End: 2018-07-09 | Stop reason: DRUGHIGH

## 2018-04-09 NOTE — PROGRESS NOTES
HISTORY OF PRESENT ILLNESS  Fani Monday is a 49 y.o. female. Chief Complaint   Patient presents with    Follow-up     1 month f/u    Weight Management    Hypertension    Medication Evaluation     Patient states that the lisinopril 20 mg causes increased sleepiness. HPI  Patient is here for a  follow up of weight management, and medication eval.    Patient states that the lisinopril 20 mg causes increased sleepiness. She was not able to take it and be as alert as she needed to be at work. She took them until she ran out about 3 days ago. She went back to the lisinopril hct 10/12.5 and has taken it for 3 days. She prefers to stay with this medication instead of changing to something else. She is able to drink coffee and be functional.    Pt has been tapering the phentermine to stop it. She does not want to cont it at this time. Patient does not need medication refills today. New concerns today: none      ROS  Review of Systems   Constitutional: Negative. HENT: Negative. Respiratory: Negative. Cardiovascular: Negative. All other systems reviewed and are negative. Physical Exam  Physical Exam   Nursing note and vitals reviewed. Constitutional: She is oriented to person, place, and time. She appears well-developed and well-nourished. HENT:   Head: Normocephalic and atraumatic. Right Ear: External ear normal.   Left Ear: External ear normal.   Nose: Nose normal.   Eyes: Conjunctivae and EOM are normal.   Neck: Normal range of motion. Neck supple. No JVD present. Carotid bruit is not present. No thyromegaly present. Cardiovascular: Normal rate, regular rhythm, normal heart sounds and intact distal pulses. Exam reveals no gallop and no friction rub. No murmur heard. Pulmonary/Chest: Effort normal and breath sounds normal. She has no wheezes. She has no rhonchi. She has no rales. Abdominal: Soft. Bowel sounds are normal.   Musculoskeletal: Normal range of motion. Neurological: She is alert and oriented to person, place, and time. Coordination normal.   Skin: Skin is warm and dry. Psychiatric: She has a normal mood and affect. Her behavior is normal. Judgment and thought content normal.     ASSESSMENT and PLAN  Diagnoses and all orders for this visit:    1. Essential hypertension  -     lisinopril-hydroCHLOROthiazide (PRINZIDE, ZESTORETIC) 10-12.5 mg per tablet; TAKE ONE TABLET BY MOUTH ONCE DAILY  Labs to be drawn prior to next appt. 2. BMI 33.0-33.9,adult  Pt will stop phentermine. Cont exercise and careful diet. 3.Type 2 diabetes mellitus without complication, without long-term current use of insulin ([facility] [address]  Labs to be drawn prior to next appt.         Follow-up Disposition: 3 months; sooner prn

## 2018-04-09 NOTE — PROGRESS NOTES
Beny SIMMONS Peoples 49 y.o. female   Chief Complaint   Patient presents with    Follow-up     1 month f/u    Weight Management    Hypertension    Medication Evaluation     Patient states that the lisinopril 20 mg causes increased sleepiness. 1. Have you been to the ER, urgent care clinic since your last visit? Hospitalized since your last visit? No    2. Have you seen or consulted any other health care providers outside of the [address] [place] [address] since your last visit? Include any pap smears or colon screening.  No

## 2018-05-16 DIAGNOSIS — E11.9 TYPE 2 DIABETES MELLITUS WITHOUT COMPLICATION, WITHOUT LONG-TERM CURRENT USE OF INSULIN (HCC): ICD-10-CM

## 2018-05-18 RX ORDER — SITAGLIPTIN AND METFORMIN HYDROCHLORIDE 500; 50 MG/1; MG/1
TABLET, FILM COATED ORAL
Qty: 180 TAB | Refills: 1 | Status: SHIPPED | OUTPATIENT
Start: 2018-05-18 | End: 2018-11-27 | Stop reason: SDUPTHER

## 2018-06-14 ENCOUNTER — HOSPITAL ENCOUNTER (EMERGENCY)
Age: 48
Discharge: HOME | End: 2018-06-14
Attending: EMERGENCY MEDICINE | Admitting: EMERGENCY MEDICINE
Payer: COMMERCIAL

## 2018-06-14 ENCOUNTER — APPOINTMENT (OUTPATIENT)
Dept: CT IMAGING | Age: 48
End: 2018-06-14
Attending: EMERGENCY MEDICINE
Payer: COMMERCIAL

## 2018-06-14 VITALS
OXYGEN SATURATION: 100 % | DIASTOLIC BLOOD PRESSURE: 67 MMHG | BODY MASS INDEX: 33.27 KG/M2 | WEIGHT: 207 LBS | TEMPERATURE: 97.7 F | HEART RATE: 67 BPM | HEIGHT: 66 IN | RESPIRATION RATE: 19 BRPM | SYSTOLIC BLOOD PRESSURE: 145 MMHG

## 2018-06-14 DIAGNOSIS — K59.00 CONSTIPATION, UNSPECIFIED CONSTIPATION TYPE: ICD-10-CM

## 2018-06-14 DIAGNOSIS — N83.201 CYST OF RIGHT OVARY: ICD-10-CM

## 2018-06-14 DIAGNOSIS — R10.9 ACUTE RIGHT FLANK PAIN: Primary | ICD-10-CM

## 2018-06-14 LAB
ALBUMIN SERPL-MCNC: 3.8 G/DL (ref 3.4–5)
ALBUMIN/GLOB SERPL: 0.9 {RATIO} (ref 0.8–1.7)
ALP SERPL-CCNC: 131 U/L (ref 45–117)
ALT SERPL-CCNC: 29 U/L (ref 13–56)
ANION GAP SERPL CALC-SCNC: 9 MMOL/L (ref 3–18)
APPEARANCE UR: ABNORMAL
AST SERPL-CCNC: 18 U/L (ref 15–37)
BACTERIA URNS QL MICRO: ABNORMAL /HPF
BASOPHILS # BLD: 0 K/UL (ref 0–0.06)
BASOPHILS NFR BLD: 0 % (ref 0–2)
BILIRUB SERPL-MCNC: 0.2 MG/DL (ref 0.2–1)
BILIRUB UR QL: NEGATIVE
BUN SERPL-MCNC: 7 MG/DL (ref 7–18)
BUN/CREAT SERPL: 7 (ref 12–20)
CALCIUM SERPL-MCNC: 9.2 MG/DL (ref 8.5–10.1)
CHLORIDE SERPL-SCNC: 99 MMOL/L (ref 100–108)
CO2 SERPL-SCNC: 26 MMOL/L (ref 21–32)
COLOR UR: YELLOW
CREAT SERPL-MCNC: 1 MG/DL (ref 0.6–1.3)
DIFFERENTIAL METHOD BLD: ABNORMAL
EOSINOPHIL # BLD: 0.1 K/UL (ref 0–0.4)
EOSINOPHIL NFR BLD: 1 % (ref 0–5)
EPITH CASTS URNS QL MICRO: ABNORMAL /LPF (ref 0–5)
ERYTHROCYTE [DISTWIDTH] IN BLOOD BY AUTOMATED COUNT: 16.6 % (ref 11.6–14.5)
GLOBULIN SER CALC-MCNC: 4.3 G/DL (ref 2–4)
GLUCOSE SERPL-MCNC: 127 MG/DL (ref 74–99)
GLUCOSE UR STRIP.AUTO-MCNC: NEGATIVE MG/DL
HCG UR QL: NEGATIVE
HCT VFR BLD AUTO: 31.4 % (ref 35–45)
HGB BLD-MCNC: 9.7 G/DL (ref 12–16)
HGB UR QL STRIP: NEGATIVE
KETONES UR QL STRIP.AUTO: NEGATIVE MG/DL
LEUKOCYTE ESTERASE UR QL STRIP.AUTO: ABNORMAL
LYMPHOCYTES # BLD: 2.4 K/UL (ref 0.9–3.6)
LYMPHOCYTES NFR BLD: 37 % (ref 21–52)
MCH RBC QN AUTO: 22.7 PG (ref 24–34)
MCHC RBC AUTO-ENTMCNC: 30.9 G/DL (ref 31–37)
MCV RBC AUTO: 73.4 FL (ref 74–97)
MONOCYTES # BLD: 0.9 K/UL (ref 0.05–1.2)
MONOCYTES NFR BLD: 13 % (ref 3–10)
NEUTS SEG # BLD: 3.2 K/UL (ref 1.8–8)
NEUTS SEG NFR BLD: 49 % (ref 40–73)
NITRITE UR QL STRIP.AUTO: NEGATIVE
PH UR STRIP: 6.5 [PH] (ref 5–8)
PLATELET # BLD AUTO: 409 K/UL (ref 135–420)
PLATELET COMMENTS,PCOM: ABNORMAL
PMV BLD AUTO: 9.3 FL (ref 9.2–11.8)
POTASSIUM SERPL-SCNC: 4.1 MMOL/L (ref 3.5–5.5)
PROT SERPL-MCNC: 8.1 G/DL (ref 6.4–8.2)
PROT UR STRIP-MCNC: NEGATIVE MG/DL
RBC # BLD AUTO: 4.28 M/UL (ref 4.2–5.3)
RBC #/AREA URNS HPF: ABNORMAL /HPF (ref 0–5)
RBC MORPH BLD: ABNORMAL
SODIUM SERPL-SCNC: 134 MMOL/L (ref 136–145)
SP GR UR REFRACTOMETRY: 1.01 (ref 1–1.03)
UROBILINOGEN UR QL STRIP.AUTO: 0.2 EU/DL (ref 0.2–1)
WBC # BLD AUTO: 6.6 K/UL (ref 4.6–13.2)
WBC URNS QL MICRO: ABNORMAL /HPF (ref 0–4)

## 2018-06-14 PROCEDURE — 80053 COMPREHEN METABOLIC PANEL: CPT | Performed by: EMERGENCY MEDICINE

## 2018-06-14 PROCEDURE — 96375 TX/PRO/DX INJ NEW DRUG ADDON: CPT

## 2018-06-14 PROCEDURE — 85025 COMPLETE CBC W/AUTO DIFF WBC: CPT | Performed by: EMERGENCY MEDICINE

## 2018-06-14 PROCEDURE — 99284 EMERGENCY DEPT VISIT MOD MDM: CPT

## 2018-06-14 PROCEDURE — 74177 CT ABD & PELVIS W/CONTRAST: CPT

## 2018-06-14 PROCEDURE — 81001 URINALYSIS AUTO W/SCOPE: CPT | Performed by: EMERGENCY MEDICINE

## 2018-06-14 PROCEDURE — 96374 THER/PROPH/DIAG INJ IV PUSH: CPT

## 2018-06-14 PROCEDURE — 74011250636 HC RX REV CODE- 250/636: Performed by: EMERGENCY MEDICINE

## 2018-06-14 PROCEDURE — 74011636320 HC RX REV CODE- 636/320: Performed by: EMERGENCY MEDICINE

## 2018-06-14 PROCEDURE — 74011250637 HC RX REV CODE- 250/637: Performed by: EMERGENCY MEDICINE

## 2018-06-14 PROCEDURE — 81025 URINE PREGNANCY TEST: CPT | Performed by: EMERGENCY MEDICINE

## 2018-06-14 RX ORDER — POLYETHYLENE GLYCOL 3350 17 G/17G
17 POWDER, FOR SOLUTION ORAL DAILY
Qty: 510 G | Refills: 0 | Status: SHIPPED | OUTPATIENT
Start: 2018-06-14 | End: 2018-08-16 | Stop reason: SDUPTHER

## 2018-06-14 RX ORDER — KETOROLAC TROMETHAMINE 30 MG/ML
INJECTION, SOLUTION INTRAMUSCULAR; INTRAVENOUS
Status: DISCONTINUED
Start: 2018-06-14 | End: 2018-06-14 | Stop reason: HOSPADM

## 2018-06-14 RX ORDER — ACETAMINOPHEN 500 MG
1000 TABLET ORAL
Status: COMPLETED | OUTPATIENT
Start: 2018-06-14 | End: 2018-06-14

## 2018-06-14 RX ORDER — LABETALOL HCL 20 MG/4 ML
20 SYRINGE (ML) INTRAVENOUS
Status: COMPLETED | OUTPATIENT
Start: 2018-06-14 | End: 2018-06-14

## 2018-06-14 RX ORDER — IBUPROFEN 800 MG/1
800 TABLET ORAL
Qty: 20 TAB | Refills: 0 | Status: SHIPPED | OUTPATIENT
Start: 2018-06-14 | End: 2018-06-21

## 2018-06-14 RX ORDER — MAGNESIUM CITRATE
SOLUTION, ORAL ORAL
Qty: 1 BOTTLE | Refills: 0 | Status: SHIPPED | OUTPATIENT
Start: 2018-06-14 | End: 2018-07-09 | Stop reason: ALTCHOICE

## 2018-06-14 RX ORDER — ACETAMINOPHEN 500 MG
500-1000 TABLET ORAL
Qty: 30 TAB | Refills: 0 | Status: SHIPPED | OUTPATIENT
Start: 2018-06-14 | End: 2018-07-09 | Stop reason: ALTCHOICE

## 2018-06-14 RX ORDER — KETOROLAC TROMETHAMINE 30 MG/ML
30 INJECTION, SOLUTION INTRAMUSCULAR; INTRAVENOUS
Status: COMPLETED | OUTPATIENT
Start: 2018-06-14 | End: 2018-06-14

## 2018-06-14 RX ADMIN — ACETAMINOPHEN 1000 MG: 500 TABLET, FILM COATED ORAL at 12:25

## 2018-06-14 RX ADMIN — KETOROLAC TROMETHAMINE 30 MG: 30 INJECTION, SOLUTION INTRAMUSCULAR at 09:35

## 2018-06-14 RX ADMIN — LABETALOL 20 MG/4 ML (5 MG/ML) INTRAVENOUS SYRINGE 20 MG: at 09:55

## 2018-06-14 RX ADMIN — IOPAMIDOL 80 ML: 612 INJECTION, SOLUTION INTRAVENOUS at 11:09

## 2018-06-14 NOTE — ED NOTES
Pt resting in bed with eyes open, currently on phone. Appear comfortable at this time. Will continue to monitor.

## 2018-06-14 NOTE — DISCHARGE INSTRUCTIONS
Functional Ovarian Cyst: Care Instructions  Your Care Instructions    A functional ovarian cyst is a sac that forms on the surface of a woman's ovary during ovulation. The sac holds a maturing egg. Usually the sac goes away after the egg is released. But if the egg is not released, or if the sac closes up after the egg is released, the sac can swell up with fluid and form a cyst.  Functional ovarian cysts are different than ovarian growths caused by other problems, such as cancer. Most functional ovarian cysts cause no symptoms and go away on their own. Some cause mild pain. Others can cause severe pain when they rupture or bleed. Follow-up care is a key part of your treatment and safety. Be sure to make and go to all appointments, and call your doctor if you are having problems. It's also a good idea to know your test results and keep a list of the medicines you take. How can you care for yourself at home? · Use heat, such as a hot water bottle, a heating pad set on low, or a warm bath, to relax tense muscles and relieve cramping. · Be safe with medicines. Take pain medicines exactly as directed. ¨ If the doctor gave you a prescription medicine for pain, take it as prescribed. ¨ If you are not taking a prescription pain medicine, ask your doctor if you can take an over-the-counter medicine. · Avoid constipation. Make sure you drink enough fluids and include fruits, vegetables, and fiber in your diet each day. Constipation does not cause ovarian cysts, but it may make your pelvic pain worse. When should you call for help? Call your doctor now or seek immediate medical care if:  ? · You have severe vaginal bleeding. ? · You have new or worse belly or pelvic pain. ? Watch closely for changes in your health, and be sure to contact your doctor if:  ? · You have unusual vaginal bleeding. ? · You do not get better as expected. Where can you learn more?   Go to http://samuel-kelsey.info/. Enter M170 in the search box to learn more about \"Functional Ovarian Cyst: Care Instructions. \"  Current as of: October 13, 2016  Content Version: 11.4  © 7778-8313 [employer]. Care instructions adapted under license by [employer] (which disclaims liability or warranty for this information). If you have questions about a medical condition or this instruction, always ask your healthcare professional. [place] [address] any warranty or liability for your use of this information. Constipation: Care Instructions  Your Care Instructions    Constipation means that you have a hard time passing stools (bowel movements). People pass stools from 3 times a day to once every 3 days. What is normal for you may be different. Constipation may occur with pain in the rectum and cramping. The pain may get worse when you try to pass stools. Sometimes there are small amounts of bright red blood on toilet paper or the surface of stools. This is because of enlarged veins near the rectum (hemorrhoids). A few changes in your diet and lifestyle may help you avoid ongoing constipation. Your doctor may also prescribe medicine to help loosen your stool. Some medicines can cause constipation. These include pain medicines and antidepressants. Tell your doctor about all the medicines you take. Your doctor may want to make a medicine change to ease your symptoms. Follow-up care is a key part of your treatment and safety. Be sure to make and go to all appointments, and call your doctor if you are having problems. It's also a good idea to know your test results and keep a list of the medicines you take. How can you care for yourself at home? · Drink plenty of fluids, enough so that your urine is light yellow or clear like water.  If you have kidney, heart, or liver disease and have to limit fluids, talk with your doctor before you increase the amount of fluids you drink. · Include high-fiber foods in your diet each day. These include fruits, vegetables, beans, and whole grains. · Get at least 30 minutes of exercise on most days of the week. Walking is a good choice. You also may want to do other activities, such as running, swimming, cycling, or playing tennis or team sports. · Take a fiber supplement, such as Citrucel or Metamucil, every day. Read and follow all instructions on the label. · Schedule time each day for a bowel movement. A daily routine may help. Take your time having your bowel movement. · Support your feet with a small step stool when you sit on the toilet. This helps flex your hips and places your pelvis in a squatting position. · Your doctor may recommend an over-the-counter laxative to relieve your constipation. Examples are Milk of Magnesia and MiraLax. Read and follow all instructions on the label. Do not use laxatives on a long-term basis. When should you call for help? Call your doctor now or seek immediate medical care if:  ? · You have new or worse belly pain. ? · You have new or worse nausea or vomiting. ? · You have blood in your stools. ? Watch closely for changes in your health, and be sure to contact your doctor if:  ? · Your constipation is getting worse. ? · You do not get better as expected. Where can you learn more? Go to http://samuel-kelsey.info/. Enter 21 [phone] in the search box to learn more about \"Constipation: Care Instructions. \"  Current as of: March 20, 2017  Content Version: 11.4  © 0663-4992 [employer]. Care instructions adapted under license by [employer] (which disclaims liability or warranty for this information). If you have questions about a medical condition or this instruction, always ask your healthcare professional. [address] any warranty or liability for your use of this information.

## 2018-06-14 NOTE — ED NOTES
Assumed care of pt from triage. Pt reports to ED with c/o severe RLQ pain, nausea and vomiting x1 day. Pt stating \"my pain just came on yesterday it radiating from my back to the R side of my abdomen. \" Rating pain 10/10 \"aching\". Abdomen soft and tender. Pt teary eyes upon assessment, resp even and unlabored. Pt currently denying any narcotics. A&Ox4. Will continue to monitor.

## 2018-06-26 DIAGNOSIS — L30.9 DERMATITIS: ICD-10-CM

## 2018-06-26 RX ORDER — TRIAMCINOLONE ACETONIDE 1 MG/G
CREAM TOPICAL
Qty: 80 G | Refills: 3 | Status: SHIPPED | OUTPATIENT
Start: 2018-06-26 | End: 2020-02-12 | Stop reason: SDUPTHER

## 2018-06-28 DIAGNOSIS — N94.6 DYSMENORRHEA: ICD-10-CM

## 2018-06-29 RX ORDER — NAPROXEN 500 MG/1
TABLET ORAL
Qty: 60 TAB | Refills: 5 | Status: SHIPPED | OUTPATIENT
Start: 2018-06-29 | End: 2019-08-20 | Stop reason: SDUPTHER

## 2018-07-05 ENCOUNTER — OFFICE VISIT (OUTPATIENT)
Dept: OBGYN CLINIC | Age: 48
End: 2018-07-05

## 2018-07-05 ENCOUNTER — HOSPITAL ENCOUNTER (OUTPATIENT)
Dept: LAB | Age: 48
Discharge: HOME | End: 2018-07-05

## 2018-07-05 VITALS
HEART RATE: 72 BPM | SYSTOLIC BLOOD PRESSURE: 174 MMHG | DIASTOLIC BLOOD PRESSURE: 77 MMHG | WEIGHT: 213.4 LBS | OXYGEN SATURATION: 100 % | TEMPERATURE: 97.4 F | BODY MASS INDEX: 34.3 KG/M2 | HEIGHT: 66 IN

## 2018-07-05 DIAGNOSIS — D25.9 UTERINE LEIOMYOMA, UNSPECIFIED LOCATION: Primary | ICD-10-CM

## 2018-07-05 PROBLEM — E11.21 TYPE 2 DIABETES WITH NEPHROPATHY (HCC): Status: ACTIVE | Noted: 2018-07-05

## 2018-07-05 LAB — SENTARA SPECIMEN COL,SENBCF: NORMAL

## 2018-07-05 PROCEDURE — 99001 SPECIMEN HANDLING PT-LAB: CPT | Performed by: FAMILY MEDICINE

## 2018-07-05 NOTE — MR AVS SNAPSHOT
[facility] [address], [address] [facility] [address] 
[phone] Patient: Ann Chairez MRN: [id number] JDN:[id number] Visit Information Date & Time Provider Department Dept. Phone Encounter #  
 7/5/2018  9:00 AM Manuel Quinn [id number] Follow-up Instructions Return in about 4 weeks (around 8/2/2018), or if symptoms worsen or fail to improve. Your Appointments 7/9/2018  9:45 AM  
Follow Up with Kacie Dudley MD  
[facility] (--) Appt Note: Follow Up; Follow Up  
 [id number] [address] [id number] [phone]  
  
   
 [address] [id number] [address] [id number] Upcoming Health Maintenance Date Due  
 PAP AKA CERVICAL CYTOLOGY 9/28/2018 Influenza Age 5 to Adult 8/1/2018 Allergies as of 7/5/2018  Review Complete On: 7/5/2018 By: Joseline Yost MD  
  
 Severity Noted Reaction Type Reactions Codeine High 08/01/2012    Nausea Only, Other (comments) Sweats felt like she was going to pass out Procardia [Nifedipine] High 06/07/2011    Shortness of Breath, Nausea and Vomiting, Other (comments) Tightness in chest on 7/6/1990 Sulfa (Sulfonamide Antibiotics) High 06/07/2011    Rash Tylox [Oxycodone-acetaminophen] High 08/01/2012    Nausea Only, Other (comments) Sweats, felt like she was going to pass out Current Immunizations  Never Reviewed Name Date Influenza Vaccine 10/16/2015 Influenza Vaccine Split 10/14/2012 Not reviewed this visit You Were Diagnosed With   
  
 Codes Comments Uterine leiomyoma, unspecified location    -  Primary ICD-10-CM: D25.9 ICD-9-CM: 218.9 Vitals BP Pulse Temp Height(growth percentile) Weight(growth percentile) 174/77 (BP 1 Location: Left arm, BP Patient Position: Sitting) 72 97.4 °F (36.3 °C) (Oral) 5' 6\" (1.676 m) 213 lb 6.4 oz (96.8 kg) LMP SpO2 BMI OB Status Smoking Status 06/30/2018 (Exact Date) 100% 34.44 kg/m2 Having regular periods Never Smoker Vitals History BMI and BSA Data Body Mass Index Body Surface Area 34.44 kg/m 2 2.12 m 2 Preferred Pharmacy Pharmacy Name Phone [address], [address] [place] [address] Rd [phone] Your Updated Medication List  
  
   
This list is accurate as of 7/5/18 10:45 PM.  Always use your most recent med list.  
  
  
  
  
 acetaminophen 500 mg tablet Commonly known as:  [address] [employer] Nichol Se Take 1-2 Tabs by mouth every six (6) hours as needed for Pain. Blood-Glucose Meter monitoring kit Test blood sugar once a day .00 One Touch Ultra Mini Glucose Meter  
  
 cyanocobalamin 1,000 mcg tablet Take 1,000 mcg by mouth daily. fluticasone 50 mcg/actuation nasal spray Commonly known as:  Filbert Chard 2 Sprays by Both Nostrils route daily. glucose blood VI test strips strip Commonly known as:  ASCENSIA AUTODISC VI, ONE TOUCH ULTRA TEST VI Test blood sugar once daily .00 One Touch Ultra Mini Test strip JANUMET  mg per tablet Generic drug:  SITagliptin-metFORMIN  
TAKE ONE TABLET BY MOUTH TWICE DAILY WITH MEALS Lancets Misc Use with One Touch Ultra Mini Glucose Meter test blood sugar once daily .00  
  
 lisinopril-hydroCHLOROthiazide 10-12.5 mg per tablet Commonly known as:  PRINZIDE, ZESTORETIC  
TAKE ONE TABLET BY MOUTH ONCE DAILY  
  
 loratadine 10 mg tablet Commonly known as:  Liz Johnson Take 1 Tab by mouth daily. magnesium citrate solution Take 1/3 of bottle every 8 hours until finished or until you have a bowel movement. meclizine 12.5 mg tablet Commonly known as:  ANTIVERT Take 1 Tab by mouth three (3) times daily as needed. montelukast 10 mg tablet Commonly known as:  SINGULAIR Take 1 Tab by mouth daily.   
  
 MULTI-VITAMIN PO  
 Take  by mouth daily. naproxen 500 mg tablet Commonly known as:  NAPROSYN  
TAKE ONE TABLET BY MOUTH TWICE DAILY WITH MEALS  
  
 ondansetron 8 mg disintegrating tablet Commonly known as:  ZOFRAN ODT Take 1 Tab by mouth every eight (8) hours as needed for Nausea. phentermine 37.5 mg tablet Commonly known as:  ADIPEX-P Take 1 Tab by mouth every morning. Max Daily Amount: 37.5 mg.  
  
 polyethylene glycol 17 gram/dose powder Commonly known as:  Chester Salt Take 17 g by mouth daily. 1 tablespoon with 8 oz of water daily PROBIOTIC 4X 10-15 mg Tbec Generic drug:  B.infantis-B.ani-B.long-B.bifi Take  by mouth daily. SLOW RELEASE IRON 140 mg (45 mg iron) Tber ER tablet Generic drug:  ferrous sulfate Take  by mouth every other day. triamcinolone acetonide 0.1 % topical cream  
Commonly known as:  KENALOG  
APPLY A THIN LAYER TO AFFECTED AREA TWICE DAILY VENTOLIN HFA 90 mcg/actuation inhaler Generic drug:  albuterol INHALE TWO PUFFS BY MOUTH EVERY 4 HOURS AS NEEDED FOR WHEEZING  
  
 VITAMIN C 500 mg tablet Generic drug:  ascorbic acid (vitamin C) Take 500 mg by mouth daily. Follow-up Instructions Return in about 4 weeks (around 8/2/2018), or if symptoms worsen or fail to improve. Patient Instructions Abdominal Hysterectomy: Before Your Surgery What is an abdominal hysterectomy? Abdominal hysterectomy is surgery to remove the uterus. The cervix is often taken out too. This is done through a cut in the lower belly. The cut is called an incision. The ovaries and fallopian tubes also may be removed. The doctor may make a horizontal incision. This cut goes from side-to-side and is done at the pubic hairline. It's called a \"bikini cut. \" Or the incision may be vertical. This type goes up and down between the belly button and the pubic bone. Both types leave a scar that most often fades with time. After the surgery, you will no longer have periods or be able to get pregnant. Your doctor may have you take hormones if your ovaries were removed. He or she will talk to you about the risks and benefits of hormones. You can also discuss how long you should take them. This surgery probably won't lower your interest in sex. In fact, some women enjoy sex more. This may be because they no longer have to worry about birth control or heavy bleeding. Most women go home in 1 to 2 days. You will likely feel better each day. But you may need about 4 to 6 weeks to fully recover. Follow-up care is a key part of your treatment and safety. Be sure to make and go to all appointments, and call your doctor if you are having problems. It's also a good idea to know your test results and keep a list of the medicines you take. What happens before surgery? ?Surgery can be stressful. This information will help you understand what you can expect. And it will help you safely prepare for surgery. ? Preparing for surgery ? · Understand exactly what surgery is planned, along with the risks, benefits, and other options. · Tell your doctors ALL the medicines, vitamins, supplements, and herbal remedies you take. Some of these can increase the risk of bleeding or interact with anesthesia. ? · If you take blood thinners, such as warfarin (Coumadin), clopidogrel (Plavix), or aspirin, be sure to talk to your doctor. He or she will tell you if you should stop taking these medicines before your surgery. Make sure that you understand exactly what your doctor wants you to do.  
? · Your doctor will tell you which medicines to take or stop before your surgery. You may need to stop taking certain medicines a week or more before surgery. So talk to your doctor as soon as you can.  
? · If you have an advance directive, let your doctor know. It may include a living will and a durable power of  for health care.  Bring a copy to the hospital. If you don't have one, you may want to prepare one. It lets your doctor and loved ones know your health care wishes. Doctors advise that everyone prepare these papers before any type of surgery or procedure. What happens on the day of surgery? · Follow the instructions exactly about when to stop eating and drinking. If you don't, your surgery may be canceled. If your doctor told you to take your medicines on the day of surgery, take them with only a sip of water. ? · Take a bath or shower before you come in for your surgery. Do not apply lotions, perfumes, deodorants, or nail polish. ? · Do not shave the surgical site yourself. ? · Take off all jewelry and piercings. And take out contact lenses, if you wear them. ? At the hospital or surgery center · Bring a picture ID. ? · You will be kept comfortable and safe by your anesthesia provider. The anesthesia may make you sleep. Or it may just numb the area being worked on. ? · The surgery takes about 1 to 2 hours. Going home · Be sure you have someone to drive you home. Anesthesia and pain medicine make it unsafe for you to drive. ? · You will be given more specific instructions about recovering from your surgery. They will cover things like diet, wound care, follow-up care, driving, and getting back to your normal routine. When should you call your doctor? · You have questions or concerns. ? · You don't understand how to prepare for your surgery. ? · You become ill before the surgery (such as fever, flu, or a cold). ? · You need to reschedule or have changed your mind about having the surgery. Where can you learn more? Go to http://samuel-kelsey.info/. Enter Y316 in the search box to learn more about \"Abdominal Hysterectomy: Before Your Surgery. \" Current as of: October 13, 2016 Content Version: 11.4 © 3814-4144 [employer]wise[employer].  Care instructions adapted under license by [address] (which disclaims liability or warranty for this information). If you have questions about a medical condition or this instruction, always ask your healthcare professional. [address] any warranty or liability for your use of this information. Uterine Fibroid Embolization: Before Your Procedure What is uterine fibroid embolization? Uterine fibroid embolization is a treatment to destroy or shrink fibroids. Fibroids are growths on or in your uterus. Sometimes they're called fibroid tumors, but they aren't cancer. You will be awake during the procedure. You will get medicine to help you relax and to help with pain. First the doctor will put a thin, flexible tube into blood vessels in both of your upper thighs. The tube is called a catheter. Then the doctor sends small particles through the catheter. These particles prevent your fibroids from getting blood. Without blood, the fibroids shrink or die. The treatment usually takes 1 to 3 hours. Most women go home 6 to 24 hours later. You may have some pain for a few hours to a few days. It will probably take about 7 to 10 days to fully recover. This treatment should reduce pain and bleeding. It may also prevent fibroids from growing back. After the treatment, less blood will go to your uterus. Because of this, pregnancy is not recommended. So it's important to talk with your doctor about birth control options. Follow-up care is a key part of your treatment and safety. Be sure to make and go to all appointments, and call your doctor if you are having problems. It's also a good idea to know your test results and keep a list of the medicines you take. What happens before the procedure? ?Preparing for the procedure ? · Understand exactly what procedure is planned, along with the risks, benefits, and other options.   
 · Tell your doctors ALL the medicines, vitamins, supplements, and herbal remedies you take. Some of these can increase the risk of bleeding or interact with anesthesia. ? · If you take blood thinners, such as warfarin (Coumadin), clopidogrel (Plavix), or aspirin, be sure to talk to your doctor. He or she will tell you if you should stop taking these medicines before your procedure. Make sure that you understand exactly what your doctor wants you to do.  
? · Your doctor will tell you which medicines to take or stop before your procedure. You may need to stop taking certain medicines a week or more before the procedure. So talk to your doctor as soon as you can.  
? · If you have an advance directive, let your doctor know. It may include a living will and a durable power of  for health care. Bring a copy to the hospital. If you don't have one, you may want to prepare one. It lets your doctor and loved ones know your health care wishes. Doctors advise that everyone prepare these papers before any type of surgery or procedure. Procedures can be stressful. This information will help you understand what you can expect. And it will help you safely prepare for your procedure. What happens on the day of the procedure? · Follow the instructions exactly about when to stop eating and drinking. If you don't, your procedure may be canceled. If your doctor told you to take your medicines on the day of the procedure, take them with only a sip of water. ? · Take a bath or shower before you come in for your procedure. Do not apply lotions, perfumes, deodorants, or nail polish. ? · Take off all jewelry and piercings. And take out contact lenses, if you wear them. ? At the hospital or surgery center · Bring a picture ID. ? · You will be kept comfortable and safe by your anesthesia provider. You may get medicine that relaxes you or puts you in a light sleep. The area being worked on will be numb. ? · The procedure will take about 1 to 3 hours. Going home · Be sure you have someone to drive you home. Anesthesia and pain medicine make it unsafe for you to drive. ? · You will be given more specific instructions about recovering from your procedure. They will cover things like diet, wound care, follow-up care, driving, and getting back to your normal routine. When should you call your doctor? · You have questions or concerns. ? · You don't understand how to prepare for your procedure. ? · You become ill before the procedure (such as fever, flu, or a cold). ? · You need to reschedule or have changed your mind about having the procedure. Where can you learn more? Go to http://samuel51intern.com Ã¨â€¹Â±Ã¨â€¦Â¾Ã§Â½â€˜kelsey.info/. Enter [id number] [phone] [id number] [phone] in the search box to learn more about \"Uterine Fibroid Embolization: Before Your Procedure. \" Current as of: October 13, 2016 Content Version: 11.4 © 7222-6667 [employer]. Care instructions adapted under license by [employer] (which disclaims liability or warranty for this information). If you have questions about a medical condition or this instruction, always ask your healthcare professional. [address] any warranty or liability for your use of this information. Uterine Fibroids: Care Instructions Your Care Instructions Uterine fibroids are growths in the uterus. Fibroids aren't cancer. Doctors don't know what causes fibroids. Fibroids are very common in women during their childbearing years. Fibroids can grow on the inside of the uterus, in the muscle wall of the uterus, or near the outside wall of the uterus. In some women, fibroids cause painful cramps and heavy periods. In these cases, taking anti-inflammatory medicines, birth control pills, or using an intrauterine device (IUD) often helps decrease symptoms. Sometimes surgery is needed to treat fibroids. But if you are near menopause, you may want to wait and see if your symptoms get better. Most fibroids shrink and go away after menopause, when your menstrual periods stop completely. Follow-up care is a key part of your treatment and safety. Be sure to make and go to all appointments, and call your doctor if you are having problems. It's also a good idea to know your test results and keep a list of the medicines you take. How can you care for yourself at home? · If your doctor gave you medicine, take it as exactly as prescribed. Be safe with medicines. Call your doctor if you think you are having a problem with your medicine. · Take anti-inflammatory medicines for pain. These include ibuprofen (Advil, Motrin) and naproxen (Aleve). Read and follow all instructions on the label. · Use heat, such as a hot water bottle or a heating pad set on low, or a warm bath to relax tense muscles and relieve cramping. Put a thin cloth between the heating pad and your skin. Never go to sleep with a heating pad on. · Lie down and put a pillow under your knees. Or, lie on your side and bring your knees up to your chest. These positions may help relieve belly pain or pressure. · Keep track of how many sanitary pads or tampons you use each day. · Get at least 30 minutes of exercise on most days of the week. Walking is a good choice. You also may want to do other activities, such as running, swimming, cycling, or playing tennis or team sports. · If you bleed longer than usual or have heavy bleeding, take a daily multivitamin with iron. When should you call for help? Call your doctor now or seek immediate medical care if: 
? · You have severe vaginal bleeding. ? · You have new or worse belly or pelvic pain. ? Watch closely for changes in your health, and be sure to contact your doctor if: 
? · You have unusual vaginal bleeding. ? · You do not get better as expected. Where can you learn more? Go to http://samuel-kelsey.info/. Enter B121 in the search box to learn more about \"Uterine Fibroids: Care Instructions. \" Current as of: October 13, 2016 Content Version: 11.4 © 6519-4789 [employer]. Care instructions adapted under license by [employer] (which disclaims liability or warranty for this information). If you have questions about a medical condition or this instruction, always ask your healthcare professional. [address] any warranty or liability for your use of this information. Introducing [facility] & HEALTH SERVICES! Dear Yevgeniy Guillen: 
Thank you for requesting a [employer] account. Our records indicate that you already have an active KidzVuz account. You can access your account anytime at [email or web address] Did you know that you can access your hospital and ER discharge instructions at any time in KidzVuz? You can also review all of your test results from your hospital stay or ER visit. Additional Information If you have questions, please visit the Frequently Asked Questions section of the [employer]uz website at [email or web address] Remember, KidzVuz is NOT to be used for urgent needs. For medical emergencies, dial 911. Now available from your iPhone and Android! Please provide this summary of care documentation to your next provider. Your primary care clinician is listed as Kay Gillis. If you have any questions after today's visit, please call [phone].

## 2018-07-05 NOTE — PROGRESS NOTES
Progress Note    Patient: Zuleyka Fournier  MRN: [id number]  Date: 7/5/2018     Age:  49 y.o.,      Sex: female    YOB: 1970    Zuleyka Fournier is a 49y.o. year-old female, G 4 P 4  whose last normal menstrual period was 6/30/18 . She had a BTL in 1993. She presents today with complaints of abnormal vaginal bleeding, anemia and uterine fibroids. Review of Systems: General, Cardiovascular, Respiratory, Gastrointestinal, Genitourinary, Neuropsychiatry, Musculoskeletal. Patient denies any problems associated with these systems except for of hypertension, and DM. She is here to discuss permanent surgical procedure. .    General Examination: She is a well-developed, well-nourished female in no acute distress. HEENT--all within normal limits. Lungs--clear to A&P. Cardiovascular system--normal sinus rhythm, no murmurs or               gallop. Abdomen--soft, nontender, and normal active. Pelvic exam--External genitalia and BUS--normal.             Cervix: Normal    Vagina: vaginal discharge white and odorless                          Uterus: enlarged, 12 weeks size, irregular, retroverted    Adnexa: normal bimanual exam    Impression. --Symptomatic Uterine Fibroids. Diabetes Mellitus,                          Hypertension. Anemia. Plan: --Will ask her to be scheduled for NATO and Possible Salpingectomy. CBC              RTC--prn.     Chace Silverman MD  July 5, 2018

## 2018-07-06 ENCOUNTER — TELEPHONE (OUTPATIENT)
Dept: FAMILY MEDICINE CLINIC | Age: 48
End: 2018-07-06

## 2018-07-06 LAB
A-G RATIO,AGRAT: 1.3 RATIO (ref 1.1–2.6)
ALBUMIN SERPL-MCNC: 3.8 G/DL (ref 3.5–5)
ALP SERPL-CCNC: 90 U/L (ref 25–115)
ALT SERPL-CCNC: 12 U/L (ref 5–40)
ANION GAP SERPL CALC-SCNC: 17 MMOL/L
AST SERPL W P-5'-P-CCNC: 13 U/L (ref 10–37)
AVG GLU, 10930: 196 MG/DL (ref 91–123)
BILIRUB SERPL-MCNC: <0.1 MG/DL (ref 0.2–1.2)
BUN SERPL-MCNC: 11 MG/DL (ref 6–22)
CALCIUM SERPL-MCNC: 9.5 MG/DL (ref 8.4–10.5)
CHLORIDE SERPL-SCNC: 101 MMOL/L (ref 98–110)
CHOLEST SERPL-MCNC: 173 MG/DL (ref 110–200)
CO2 SERPL-SCNC: 23 MMOL/L (ref 20–32)
CREAT SERPL-MCNC: 1 MG/DL (ref 0.5–1.2)
GFRAA, 66117: >60
GFRNA, 66118: 56.6
GLOBULIN,GLOB: 2.9 G/DL (ref 2–4)
GLUCOSE SERPL-MCNC: 115 MG/DL (ref 70–99)
HBA1C MFR BLD HPLC: 8.5 % (ref 4.8–5.9)
HDLC SERPL-MCNC: 3.8 MG/DL (ref 0–5)
HDLC SERPL-MCNC: 46 MG/DL (ref 40–59)
LDLC SERPL CALC-MCNC: 108 MG/DL (ref 50–99)
POTASSIUM SERPL-SCNC: 4.5 MMOL/L (ref 3.5–5.5)
PROT SERPL-MCNC: 6.7 G/DL (ref 6.4–8.3)
SODIUM SERPL-SCNC: 141 MMOL/L (ref 133–145)
TRIGL SERPL-MCNC: 96 MG/DL (ref 40–149)
VLDLC SERPL CALC-MCNC: 19 MG/DL (ref 8–30)

## 2018-07-06 NOTE — PATIENT INSTRUCTIONS
Abdominal Hysterectomy: Before Your Surgery  What is an abdominal hysterectomy? Abdominal hysterectomy is surgery to remove the uterus. The cervix is often taken out too. This is done through a cut in the lower belly. The cut is called an incision. The ovaries and fallopian tubes also may be removed. The doctor may make a horizontal incision. This cut goes from side-to-side and is done at the pubic hairline. It's called a \"bikini cut. \" Or the incision may be vertical. This type goes up and down between the belly button and the pubic bone. Both types leave a scar that most often fades with time. After the surgery, you will no longer have periods or be able to get pregnant. Your doctor may have you take hormones if your ovaries were removed. He or she will talk to you about the risks and benefits of hormones. You can also discuss how long you should take them. This surgery probably won't lower your interest in sex. In fact, some women enjoy sex more. This may be because they no longer have to worry about birth control or heavy bleeding. Most women go home in 1 to 2 days. You will likely feel better each day. But you may need about 4 to 6 weeks to fully recover. Follow-up care is a key part of your treatment and safety. Be sure to make and go to all appointments, and call your doctor if you are having problems. It's also a good idea to know your test results and keep a list of the medicines you take. What happens before surgery? ?Surgery can be stressful. This information will help you understand what you can expect. And it will help you safely prepare for surgery. ? Preparing for surgery  ? · Understand exactly what surgery is planned, along with the risks, benefits, and other options. · Tell your doctors ALL the medicines, vitamins, supplements, and herbal remedies you take. Some of these can increase the risk of bleeding or interact with anesthesia. ?  · If you take blood thinners, such as warfarin (Coumadin), clopidogrel (Plavix), or aspirin, be sure to talk to your doctor. He or she will tell you if you should stop taking these medicines before your surgery. Make sure that you understand exactly what your doctor wants you to do.   ? · Your doctor will tell you which medicines to take or stop before your surgery. You may need to stop taking certain medicines a week or more before surgery. So talk to your doctor as soon as you can.   ? · If you have an advance directive, let your doctor know. It may include a living will and a durable power of  for health care. Bring a copy to the hospital. If you don't have one, you may want to prepare one. It lets your doctor and loved ones know your health care wishes. Doctors advise that everyone prepare these papers before any type of surgery or procedure. What happens on the day of surgery? · Follow the instructions exactly about when to stop eating and drinking. If you don't, your surgery may be canceled. If your doctor told you to take your medicines on the day of surgery, take them with only a sip of water. ? · Take a bath or shower before you come in for your surgery. Do not apply lotions, perfumes, deodorants, or nail polish. ? · Do not shave the surgical site yourself. ? · Take off all jewelry and piercings. And take out contact lenses, if you wear them. ? At the hospital or surgery center   · Bring a picture ID. ? · You will be kept comfortable and safe by your anesthesia provider. The anesthesia may make you sleep. Or it may just numb the area being worked on. ? · The surgery takes about 1 to 2 hours. Going home   · Be sure you have someone to drive you home. Anesthesia and pain medicine make it unsafe for you to drive. ? · You will be given more specific instructions about recovering from your surgery. They will cover things like diet, wound care, follow-up care, driving, and getting back to your normal routine.    When should you call your doctor? · You have questions or concerns. ? · You don't understand how to prepare for your surgery. ? · You become ill before the surgery (such as fever, flu, or a cold). ? · You need to reschedule or have changed your mind about having the surgery. Where can you learn more? Go to http://samuel-kelsey.info/. Enter R304 in the search box to learn more about \"Abdominal Hysterectomy: Before Your Surgery. \"  Current as of: October 13, 2016  Content Version: 11.4  © 4832-8308 [employer]. Care instructions adapted under license by [employer] (which disclaims liability or warranty for this information). If you have questions about a medical condition or this instruction, always ask your healthcare professional. [address] any warranty or liability for your use of this information. Uterine Fibroid Embolization: Before Your Procedure  What is uterine fibroid embolization? Uterine fibroid embolization is a treatment to destroy or shrink fibroids. Fibroids are growths on or in your uterus. Sometimes they're called fibroid tumors, but they aren't cancer. You will be awake during the procedure. You will get medicine to help you relax and to help with pain. First the doctor will put a thin, flexible tube into blood vessels in both of your upper thighs. The tube is called a catheter. Then the doctor sends small particles through the catheter. These particles prevent your fibroids from getting blood. Without blood, the fibroids shrink or die. The treatment usually takes 1 to 3 hours. Most women go home 6 to 24 hours later. You may have some pain for a few hours to a few days. It will probably take about 7 to 10 days to fully recover. This treatment should reduce pain and bleeding. It may also prevent fibroids from growing back. After the treatment, less blood will go to your uterus.  Because of this, pregnancy is not recommended. So it's important to talk with your doctor about birth control options. Follow-up care is a key part of your treatment and safety. Be sure to make and go to all appointments, and call your doctor if you are having problems. It's also a good idea to know your test results and keep a list of the medicines you take. What happens before the procedure? ?Preparing for the procedure  ? · Understand exactly what procedure is planned, along with the risks, benefits, and other options. · Tell your doctors ALL the medicines, vitamins, supplements, and herbal remedies you take. Some of these can increase the risk of bleeding or interact with anesthesia. ? · If you take blood thinners, such as warfarin (Coumadin), clopidogrel (Plavix), or aspirin, be sure to talk to your doctor. He or she will tell you if you should stop taking these medicines before your procedure. Make sure that you understand exactly what your doctor wants you to do.   ? · Your doctor will tell you which medicines to take or stop before your procedure. You may need to stop taking certain medicines a week or more before the procedure. So talk to your doctor as soon as you can.   ? · If you have an advance directive, let your doctor know. It may include a living will and a durable power of  for health care. Bring a copy to the hospital. If you don't have one, you may want to prepare one. It lets your doctor and loved ones know your health care wishes. Doctors advise that everyone prepare these papers before any type of surgery or procedure. Procedures can be stressful. This information will help you understand what you can expect. And it will help you safely prepare for your procedure. What happens on the day of the procedure? · Follow the instructions exactly about when to stop eating and drinking. If you don't, your procedure may be canceled.  If your doctor told you to take your medicines on the day of the procedure, take them with only a sip of water. ? · Take a bath or shower before you come in for your procedure. Do not apply lotions, perfumes, deodorants, or nail polish. ? · Take off all jewelry and piercings. And take out contact lenses, if you wear them. ? At the hospital or surgery center   · Bring a picture ID. ? · You will be kept comfortable and safe by your anesthesia provider. You may get medicine that relaxes you or puts you in a light sleep. The area being worked on will be numb. ? · The procedure will take about 1 to 3 hours. Going home   · Be sure you have someone to drive you home. Anesthesia and pain medicine make it unsafe for you to drive. ? · You will be given more specific instructions about recovering from your procedure. They will cover things like diet, wound care, follow-up care, driving, and getting back to your normal routine. When should you call your doctor? · You have questions or concerns. ? · You don't understand how to prepare for your procedure. ? · You become ill before the procedure (such as fever, flu, or a cold). ? · You need to reschedule or have changed your mind about having the procedure. Where can you learn more? Go to http://samuel-kelsey.info/. Enter [id number] [id number] [phone] in the search box to learn more about \"Uterine Fibroid Embolization: Before Your Procedure. \"  Current as of: October 13, 2016  Content Version: 11.4  © 8056-3913 [employer]. Care instructions adapted under license by [employer] (which disclaims liability or warranty for this information). If you have questions about a medical condition or this instruction, always ask your healthcare professional. [address] any warranty or liability for your use of this information. Uterine Fibroids: Care Instructions  Your Care Instructions    Uterine fibroids are growths in the uterus. Fibroids aren't cancer. Doctors don't know what causes fibroids. Fibroids are very common in women during their childbearing years. Fibroids can grow on the inside of the uterus, in the muscle wall of the uterus, or near the outside wall of the uterus. In some women, fibroids cause painful cramps and heavy periods. In these cases, taking anti-inflammatory medicines, birth control pills, or using an intrauterine device (IUD) often helps decrease symptoms. Sometimes surgery is needed to treat fibroids. But if you are near menopause, you may want to wait and see if your symptoms get better. Most fibroids shrink and go away after menopause, when your menstrual periods stop completely. Follow-up care is a key part of your treatment and safety. Be sure to make and go to all appointments, and call your doctor if you are having problems. It's also a good idea to know your test results and keep a list of the medicines you take. How can you care for yourself at home? · If your doctor gave you medicine, take it as exactly as prescribed. Be safe with medicines. Call your doctor if you think you are having a problem with your medicine. · Take anti-inflammatory medicines for pain. These include ibuprofen (Advil, Motrin) and naproxen (Aleve). Read and follow all instructions on the label. · Use heat, such as a hot water bottle or a heating pad set on low, or a warm bath to relax tense muscles and relieve cramping. Put a thin cloth between the heating pad and your skin. Never go to sleep with a heating pad on. · Lie down and put a pillow under your knees. Or, lie on your side and bring your knees up to your chest. These positions may help relieve belly pain or pressure. · Keep track of how many sanitary pads or tampons you use each day. · Get at least 30 minutes of exercise on most days of the week. Walking is a good choice. You also may want to do other activities, such as running, swimming, cycling, or playing tennis or team sports.   · If you bleed longer than usual or have heavy bleeding, take a daily multivitamin with iron. When should you call for help? Call your doctor now or seek immediate medical care if:  ? · You have severe vaginal bleeding. ? · You have new or worse belly or pelvic pain. ? Watch closely for changes in your health, and be sure to contact your doctor if:  ? · You have unusual vaginal bleeding. ? · You do not get better as expected. Where can you learn more? Go to http://samuel-kelsey.info/. Enter B121 in the search box to learn more about \"Uterine Fibroids: Care Instructions. \"  Current as of: October 13, 2016  Content Version: 11.4  © 0354-5520 [employer]. Care instructions adapted under license by [employer] (which disclaims liability or warranty for this information). If you have questions about a medical condition or this instruction, always ask your healthcare professional. [address] any warranty or liability for your use of this information.

## 2018-07-06 NOTE — TELEPHONE ENCOUNTER
called and stated that patient had and Microalbumin ordered but no specimen was received so lab was cancelled. Please advise.

## 2018-07-09 ENCOUNTER — OFFICE VISIT (OUTPATIENT)
Dept: FAMILY MEDICINE CLINIC | Age: 48
End: 2018-07-09

## 2018-07-09 VITALS
WEIGHT: 215 LBS | HEIGHT: 66 IN | DIASTOLIC BLOOD PRESSURE: 86 MMHG | BODY MASS INDEX: 34.55 KG/M2 | TEMPERATURE: 98.2 F | HEART RATE: 61 BPM | SYSTOLIC BLOOD PRESSURE: 188 MMHG | RESPIRATION RATE: 20 BRPM

## 2018-07-09 DIAGNOSIS — G89.29 CHRONIC LEFT SHOULDER PAIN: ICD-10-CM

## 2018-07-09 DIAGNOSIS — E11.9 TYPE 2 DIABETES MELLITUS WITHOUT COMPLICATION, WITHOUT LONG-TERM CURRENT USE OF INSULIN (HCC): Primary | ICD-10-CM

## 2018-07-09 DIAGNOSIS — K59.00 CONSTIPATION, UNSPECIFIED CONSTIPATION TYPE: ICD-10-CM

## 2018-07-09 DIAGNOSIS — E78.2 MIXED HYPERLIPIDEMIA: ICD-10-CM

## 2018-07-09 DIAGNOSIS — N83.201 RIGHT OVARIAN CYST: ICD-10-CM

## 2018-07-09 DIAGNOSIS — M25.512 CHRONIC LEFT SHOULDER PAIN: ICD-10-CM

## 2018-07-09 DIAGNOSIS — I10 ESSENTIAL HYPERTENSION: ICD-10-CM

## 2018-07-09 DIAGNOSIS — E11.9 TYPE 2 DIABETES MELLITUS WITHOUT COMPLICATION, WITHOUT LONG-TERM CURRENT USE OF INSULIN (HCC): ICD-10-CM

## 2018-07-09 DIAGNOSIS — D25.9 UTERINE LEIOMYOMA, UNSPECIFIED LOCATION: Primary | ICD-10-CM

## 2018-07-09 RX ORDER — LISINOPRIL AND HYDROCHLOROTHIAZIDE 12.5; 2 MG/1; MG/1
1 TABLET ORAL DAILY
Qty: 30 TAB | Refills: 5 | Status: SHIPPED | OUTPATIENT
Start: 2018-07-09 | End: 2018-08-16 | Stop reason: DRUGHIGH

## 2018-07-09 NOTE — PROGRESS NOTES
Chief Complaint   Patient presents with    Hypertension    Diabetes    Results     discuss lab results    Constipation     patient states she was seen in [facility] in June [address] [facility]  Lisa Zacarias is a 49 y.o. female. HPI  Pt here for routine f/u of htn, dm. Pt thinks her blood sugars have been fairly nl other than the 3 days she could not take her janumet after a CT in the ER. She thinks her highest postprandial sugar was 280 after meals. She has not been exercising as much recently due to her pain from the constipation and her ovarian cyst.   She had been trying to do exercises at the gym that she was taught in PT for her shoulder and back previously. She feels that she needs to go back to PT. Pt had an ER visit for r flank pain in June 2018. She was dx with r ovarian cyst and constipation. Pt was sent home with plans for mag citrate x 2 days, then miralax daily. She did have a good response and her pain decreased. The pain from the ovarian cyst eventually went away as well. Pt has had a f/u appt with gyn. They were already planning for [facility] in Oct.  They did also discuss possible salpingectomy at the time of surgery. Pt feels that her bp has been up since changing meds. Review of Systems   Constitutional: Negative. HENT: Negative. Respiratory: Negative. Cardiovascular: Negative. Gastrointestinal: Negative for constipation. Musculoskeletal: Positive for joint pain. All other systems reviewed and are negative. Physical Exam  Physical Exam   Nursing note and vitals reviewed. Constitutional: She is oriented to person, place, and time. She appears well-developed and well-nourished. HENT:   Head: Normocephalic and atraumatic. Right Ear: External ear normal.   Left Ear: External ear normal.   Nose: Nose normal.   Eyes: Conjunctivae and EOM are normal.   Neck: Normal range of motion. Neck supple. No JVD present. Carotid bruit is not present.  No thyromegaly present. Cardiovascular: Normal rate, regular rhythm, normal heart sounds and intact distal pulses. Exam reveals no gallop and no friction rub. No murmur heard. Pulmonary/Chest: Effort normal and breath sounds normal. She has no wheezes. She has no rhonchi. She has no rales. Abdominal: Soft. Bowel sounds are normal.   Musculoskeletal: Normal range of motion. Neurological: She is alert and oriented to person, place, and time. Coordination normal.   Skin: Skin is warm and dry. Psychiatric: She has a normal mood and affect. Her behavior is normal. Judgment and thought content normal.     Results for Linda Rowe (MRN [id number]) as of 7/9/2018 10:57   Ref. Range 7/5/2018 16:07   Sodium Latest Ref Range: 133 - 145 mmol/L 141   Potassium Latest Ref Range: 3.5 - 5.5 mmol/L 4.5   Chloride Latest Ref Range: 98 - 110 mmol/L 101   CO2 Latest Ref Range: 20 - 32 mmol/L 23   Anion gap Latest Units: mmol/L 17.0   Glucose Latest Ref Range: 70 - 99 mg/dL 115 (H)   BUN Latest Ref Range: 6 - 22 mg/dL 11   Creatinine Latest Ref Range: 0.5 - 1.2 mg/dL 1.0   Calcium Latest Ref Range: 8.4 - 10.5 mg/dL 9.5   Bilirubin, total Latest Ref Range: 0.2 - 1.2 mg/dL <0.1 (L)   Protein, total Latest Ref Range: 6.4 - 8.3 g/dL 6.7   Albumin Latest Ref Range: 3.5 - 5.0 g/dL 3.8   Globulin Latest Ref Range: 2.0 - 4.0 g/dL 2.9   A-G Ratio Latest Ref Range: 1.1 - 2.6 ratio 1.3   ALT (SGPT) Latest Ref Range: 5 - 40 U/L 12   AST Latest Ref Range: 10 - 37 U/L 13   Alk. phosphatase Latest Ref Range: 25 - 115 U/L 90   Triglyceride Latest Ref Range: 40 - 149 mg/dL 96   Cholesterol, total Latest Ref Range: 110 - 200 mg/dL 173   HDL Cholesterol Latest Ref Range: 40 - 59 mg/dL 46   LDL, calculated Latest Ref Range: 50 - 99 mg/dL 108 (H)   VLDL, calculated Latest Ref Range: 8 - 30 mg/dL 19   Hemoglobin A1c, (calculated) Latest Ref Range: 4.8 - 5.9 % 8.5 (H)         ASSESSMENT and PLAN  Diagnoses and all orders for this visit:    1.  Type 2 diabetes mellitus without complication, without long-term current use of insulin (HCC)  -     HEMOGLOBIN A1C WITH EAG; Future  -     METABOLIC PANEL, COMPREHENSIVE; Future  -     LIPID PANEL; Future  Pt will work on diet and exercise. 2. Essential hypertension  -     lisinopril-hydroCHLOROthiazide (PRINZIDE, ZESTORETIC) 20-12.5 mg per tablet; Take 1 Tab by mouth daily.  -     METABOLIC PANEL, COMPREHENSIVE; Future  -     LIPID PANEL; Future    3. Mixed hyperlipidemia  -     METABOLIC PANEL, COMPREHENSIVE; Future  -     LIPID PANEL; Future    4. Constipation, unspecified constipation type  Stable, cont pres tx plan. 5. Right ovarian cyst  Care as per gyn.      6. Chronic left shoulder pain  -     [facility] [place]      Follow-up Disposition:  4-6 weeks for bp; sooner prn

## 2018-07-09 NOTE — PROGRESS NOTES
Chief Complaint   Patient presents with    Hypertension    Diabetes    Results     discuss lab results       Health Maintenance Due   Topic Date Due    FOOT EXAM Q1  11/15/1979    PAP AKA CERVICAL CYTOLOGY  09/28/2018       Health Maintenance reviewed       1. Have you been to the ER, urgent care clinic since your last visit? Hospitalized since your last visit? Yes When: 6/18 Where: [facility] Reason for visit: constipation    2. Have you seen or consulted any other health care providers outside of the [facility] since your last visit? Include any pap smears or colon screening.  No

## 2018-07-11 ENCOUNTER — HOSPITAL ENCOUNTER (OUTPATIENT)
Dept: PHYSICAL THERAPY | Age: 48
Discharge: HOME | End: 2018-07-11
Payer: COMMERCIAL

## 2018-07-11 PROCEDURE — 97110 THERAPEUTIC EXERCISES: CPT

## 2018-07-11 PROCEDURE — 97161 PT EVAL LOW COMPLEX 20 MIN: CPT

## 2018-07-11 NOTE — PROGRESS NOTES
[facility]  [address]  [place], [address]  [phone] fax    Plan of Care/ Statement of Necessity for Physical Therapy Services    Patient name: Matthew Albright Start of Care: 2018   Referral source: Gilberto Yepez MD : 1970    Medical Diagnosis: Pain in left shoulder [M25.512]  Other chronic pain [G89.29]   Onset Date:2 months    Treatment Diagnosis: Left shoulder pain   Prior Hospitalization: see medical history Provider#: [id number]   Medications: Verified on Patient summary List    Comorbidities: diabetes, HTN   Prior Level of Function: Pt reports full shoulder ROM without pain     The Plan of Care and following information is based on the information from the initial evaluation. Assessment/ key information: Pt is a 48 y/o F presenting with c/o left shoulder pain for the past two months. Pt reports no known cause of pain, but states she had a similar instance of left shoulder pain 8 years ago. Pt did have PT for the condition at that time with good results. She has not had any imaging for the shoulder at this time. Pt demonstrates end range left shoulder AROM limitations compared to right, pain limited strength testing with some breakaway testing through shoulder flexion and ER. TTP noted through left teres and infraspinatus. Pain and weakness noted with left shoulder IR strength testing. PROM with empty end feel on left, (+) capsular tightness mostly into IR. Pt would benefit from PT to improve functional mobility and ROM to return to PLOF.     Evaluation Complexity History MEDIUM  Complexity : 1-2 comorbidities / personal factors will impact the outcome/ POC ; Examination LOW Complexity : 1-2 Standardized tests and measures addressing body structure, function, activity limitation and / or participation in recreation  ;Presentation LOW Complexity : Stable, uncomplicated  ;Clinical Decision Making MEDIUM Complexity : FOTO score of 26-74  Overall Complexity Rating: LOW   Problem List: pain affecting function, decrease ROM, decrease strength, decrease ADL/ functional abilitiies, decrease activity tolerance and decrease flexibility/ joint mobility   Treatment Plan may include any combination of the following: Therapeutic exercise, Therapeutic activities, Neuromuscular re-education, Physical agent/modality, Manual therapy, Patient education, Self Care training and Functional mobility training  Patient / Family readiness to learn indicated by: trying to perform skills  Persons(s) to be included in education: patient (P)  Barriers to Learning/Limitations: yes;  financial  Patient Goal (s): Get back the range of motion  Patient Self Reported Health Status: good  Rehabilitation Potential: good    Short Term Goals: To be accomplished in 2 weeks:  1. Pt will be I and compliant with HEP to promote self management of symptoms. 2. Pt will demonstrate left shoulder AROM WNL in s/l to improve joint mobility for progression of therex. Long Term Goals: To be accomplished in 4 weeks:  1. Pt will demonstrate left shoulder flexion to 140 deg without pain for improved ease of ADLs. 2. Pt will demonstrate left shoulder posterior capsule mobility to 50 deg without pain for improved self care. 3. Pt will demonstrate 4+/5 left shoulder strength testing without pain for improved ease with daily tasks. 4. Pt will report no difficulty reaching a shelf at shoulder height for improved quality of life. Frequency / Duration: Patient to be seen 2 times per week for 4 weeks. Patient/ Caregiver education and instruction: Diagnosis, prognosis, self care, activity modification and exercises   [x]  Plan of care has been reviewed with ALLIE Leavitt, MARIA GUADALUPE, CMTPT 7/11/2018 12:45 PM    ________________________________________________________________________    I certify that the above Therapy Services are being furnished while the patient is under my care.  I agree with the treatment plan and certify that this therapy is necessary.     [de-identified] Signature:____________________  Date:____________Time: _________    Please sign and return to [facility] [address] [place]le Weaver [address] [place] [address], [facility] [address]  [phone] fax

## 2018-07-11 NOTE — PROGRESS NOTES
PT DAILY TREATMENT NOTE     Patient Name: Zurdo Walker  Date:2018  : 1970  [x]  Patient  Verified  Payor: Hermelinda Ferreira / Plan: [place] [employer] / Product Type: PPO /    In time:12:13  Out time:12:42  Total Treatment Time (min): 29  Visit #: 1 of 8    Treatment Area: Pain in left shoulder [M25.512]  Other chronic pain [G89.29]    SUBJECTIVE  Pain Level (0-10 scale): 0  Any medication changes, allergies to medications, adverse drug reactions, diagnosis change, or new procedure performed?: [x] No    [] Yes (see summary sheet for update)  Subjective functional status/changes:   [] No changes reported  \"it doesn't hurt if I dont move it\"    OBJECTIVE    Modality rationale: PD to improve the patients ability to    Min Type Additional Details    [] Estim:  []Unatt       []IFC  []Premod                        []Other:  []w/ice   []w/heat  Position:  Location:    [] Estim: []Att    []TENS instruct  []NMES                    []Other:  []w/US   []w/ice   []w/heat  Position:  Location:    []  Traction: [] Cervical       []Lumbar                       [] Prone          []Supine                       []Intermittent   []Continuous Lbs:  [] before manual  [] after manual    []  Ultrasound: []Continuous   [] Pulsed                           []1MHz   []3MHz W/cm2:  Location:    []  Iontophoresis with dexamethasone         Location: [] Take home patch   [] In clinic    []  Ice     []  heat  []  Ice massage  []  Laser   []  Anodyne Position:  Location:    []  Laser with stim  []  Other:  Position:  Location:    []  Vasopneumatic Device Pressure:       [] lo [] med [] hi   Temperature: [] lo [] med [] hi   [] Skin assessment post-treatment:  []intact []redness- no adverse reaction    []redness  adverse reaction:     15 min [x]Eval                  []Re-Eval       14 min Therapeutic Exercise:  [] See flow sheet :   Rationale: increase ROM and increase strength to improve the patients ability to perform daily tasks and ADLs          With   [] TE   [] TA   [] neuro   [] other: Patient Education: [x] Review HEP    [] Progressed/Changed HEP based on:   [] positioning   [] body mechanics   [] transfers   [] heat/ice application    [] other:      Other Objective/Functional Measures: see initial eval     Pain Level (0-10 scale) post treatment: 3    ASSESSMENT/Changes in Function: see POC    Patient will continue to benefit from skilled PT services to modify and progress therapeutic interventions, address functional mobility deficits, address ROM deficits, address strength deficits, analyze and address soft tissue restrictions, analyze and cue movement patterns and analyze and modify body mechanics/ergonomics to attain remaining goals. [x]  See Plan of Care  []  See progress note/recertification  []  See Discharge Summary         Progress towards goals / Updated goals:  Short Term Goals: To be accomplished in 2 weeks:  1. Pt will be I and compliant with HEP to promote self management of symptoms. 2. Pt will demonstrate left shoulder AROM WNL in s/l to improve joint mobility for progression of therex. Long Term Goals: To be accomplished in 4 weeks:  1. Pt will demonstrate left shoulder flexion to 140 deg without pain for improved ease of ADLs. 2. Pt will demonstrate left shoulder posterior capsule mobility to 50 deg without pain for improved self care. 3. Pt will demonstrate 4+/5 left shoulder strength testing without pain for improved ease with daily tasks. 4. Pt will report no difficulty reaching a shelf at shoulder height for improved quality of life.     PLAN  []  Upgrade activities as tolerated     [x]  Continue plan of care  []  Update interventions per flow sheet       []  Discharge due to:_  []  Other:_      Leopold How, CMTPT 7/11/2018  1:14 PM    Future Appointments  Date Time Provider Jacqui Roberts   7/13/2018 1:00 PM Kath Maria Luisa E Sue [address] [facility]   7/17/2018 12:30 PM TOMY MARTINEZ [facility]   7/19/2018 1:00 PM Donato Risk, PTA [facility]   7/20/2018 2:00 PM [facility] US RM 1 [facility]   7/24/2018 12:30 PM BACKENS, TOMY [facility]   7/26/2018 1:00 PM BACKENS, TOMY [facility]   7/31/2018 12:30 PM [place] [facility]   8/2/2018 12:30 PM Donato Risk, PTA [facility]   8/16/2018 12:45 PM Francheska Wen MD [facility] None

## 2018-07-13 ENCOUNTER — HOSPITAL ENCOUNTER (OUTPATIENT)
Dept: PHYSICAL THERAPY | Age: 48
Discharge: HOME | End: 2018-07-13
Payer: COMMERCIAL

## 2018-07-13 PROCEDURE — 97110 THERAPEUTIC EXERCISES: CPT

## 2018-07-13 PROCEDURE — 97140 MANUAL THERAPY 1/> REGIONS: CPT

## 2018-07-13 NOTE — PROGRESS NOTES
PT DAILY TREATMENT NOTE 3-16    Patient Name: Steph Burn  Date:2018  : 1970  [x]  Patient  Verified  Payor: Jackie Guardado / Plan: [employer] / Product Type: PPO /    In time:1:06  Out time:1:40  Total Treatment Time (min): 34  Visit #: 2 of 8    Treatment Area: Pain in left shoulder [M25.512]  Other chronic pain [G89.29]    SUBJECTIVE  Pain Level (0-10 scale): 1-5-2/10  Any medication changes, allergies to medications, adverse drug reactions, diagnosis change, or new procedure performed?: [x] No    [] Yes (see summary sheet for update)  Subjective functional status/changes:   [] No changes reported  \"I have been doing the door exercise at home. \"    OBJECTIVE  Modality rationale: PD   Min Type Additional Details    [] Estim:  []Unatt       []IFC  []Premod                        []Other:  []w/ice   []w/heat  Position:  Location:    [] Estim: []Att    []TENS instruct  []NMES                    []Other:  []w/US   []w/ice   []w/heat  Position:  Location:    []  Traction: [] Cervical       []Lumbar                       [] Prone          []Supine                       []Intermittent   []Continuous Lbs:  [] before manual  [] after manual    []  Ultrasound: []Continuous   [] Pulsed                           []1MHz   []3MHz Location:  W/cm2:    []  Iontophoresis with dexamethasone         Location: [] Take home patch   [] In clinic    []  Ice     []  heat  []  Ice massage  []  Laser   []  Anodyne Position:  Location:    []  Laser with stim  []  Other: Position:  Location:    []  Vasopneumatic Device Pressure:       [] lo [] med [] hi   Temperature: [] lo [] med [] hi   [] Skin assessment post-treatment:  []intact []redness- no adverse reaction    []redness  adverse reaction:     25 min Therapeutic Exercise:  [x] See flow sheet :   Rationale: increase ROM, increase strength and improve coordination to improve the patients ability to increase ease with ADLs    8 min Manual Therapy:  PROM left shoulder, left scap mobs   Rationale: decrease pain, increase ROM and increase tissue extensibility to ease ADL tolerance           With   [] TE   [] TA   [] neuro   [] other: Patient Education: [x] Review HEP    [] Progressed/Changed HEP based on:   [] positioning   [] body mechanics   [] transfers   [] heat/ice application    [] other:      Other Objective/Functional Measures:   First follow up session---cues sequencing and correct form throughout     Pain Level (0-10 scale) post treatment: 6 \"I tolerate a lot of pain\"    ASSESSMENT/Changes in Function:   Initiated POC per flowsheet. Patient reports HEP noncompliance. Presents with painful end ranges, ABD being most limited. Patient will continue to benefit from skilled PT services to modify and progress therapeutic interventions, address functional mobility deficits, address ROM deficits, address strength deficits, analyze and address soft tissue restrictions, analyze and cue movement patterns, analyze and modify body mechanics/ergonomics and assess and modify postural abnormalities to attain remaining goals. []  See Plan of Care  []  See progress note/recertification  []  See Discharge Summary         Short Term Goals: To be accomplished in 2 weeks:  1. Pt will be I and compliant with HEP to promote self management of symptoms. not met (7/13/2018)  2. Pt will demonstrate left shoulder AROM WNL in s/l to improve joint mobility for progression of therex. Long Term Goals: To be accomplished in 4 weeks:  1. Pt will demonstrate left shoulder flexion to 140 deg without pain for improved ease of ADLs. 2. Pt will demonstrate left shoulder posterior capsule mobility to 50 deg without pain for improved self care. 3. Pt will demonstrate 4+/5 left shoulder strength testing without pain for improved ease with daily tasks. 4. Pt will report no difficulty reaching a shelf at shoulder height for improved quality of life.     PLAN  []  Upgrade activities as tolerated     [x] Continue plan of care  []  Update interventions per flow sheet       []  Discharge due to:_  []  Other:_      Margaret Samuelner 7/13/2018  1:08 PM    Future Appointments  Date Time Provider Jacqui Roberts   7/17/2018 12:30 PM TOMY Palacios [facility]   7/19/2018 1:00 PM Destiny Nam, ALLIE [facility]   7/20/2018 2:00 PM HBV US RM 1 [facility]   7/24/2018 12:30 PM TOMY MARTINEZ [facility]   7/26/2018 1:00 PM TOMY MARTINEZ [facility]   7/31/2018 12:30 PM Mauricio Reed [facility]   8/2/2018 12:30 PM Destiny Nam, PTA [facility]   8/16/2018 12:45 PM [facility] None

## 2018-07-17 ENCOUNTER — HOSPITAL ENCOUNTER (OUTPATIENT)
Dept: PHYSICAL THERAPY | Age: 48
Discharge: HOME | End: 2018-07-17
Payer: COMMERCIAL

## 2018-07-17 PROCEDURE — 97140 MANUAL THERAPY 1/> REGIONS: CPT

## 2018-07-17 PROCEDURE — 97110 THERAPEUTIC EXERCISES: CPT

## 2018-07-17 NOTE — PROGRESS NOTES
PT DAILY TREATMENT NOTE     Patient Name: Florencia Rincon  Date:2018  : 1970  [x]  Patient  Verified  Payor: Dick Grewal / Plan: [employer] / Product Type: PPO /    In time:12:29  Out time: 1:07  Total Treatment Time (min): 38  Visit #: 3 of 8    Treatment Area: Pain in left shoulder [M25.512]  Other chronic pain [G89.29]    SUBJECTIVE  Pain Level (0-10 scale): 3/10  Any medication changes, allergies to medications, adverse drug reactions, diagnosis change, or new procedure performed?: [x] No    [] Yes (see summary sheet for update)  Subjective functional status/changes:   [] No changes reported  Patient stated that she felt a little more sore after doing all the exercises last session, but increased pain went away after about an hour. Patient stated she had increased pain in left shoulder yesterday (6/10) but wasn't sure why it was elevated. Patient thinks it may have been related to how she slept. OBJECTIVE    28 min Therapeutic Exercise:  [x] See flow sheet :   Rationale: increase ROM and increase strength to improve the patients ability to perform ADLs. 10 min Manual Therapy:  scap mobs; STM to left UT and along vertebral border of scap; Post GHJ mob grade 2; PROM    Rationale: decrease pain, increase ROM and increase tissue extensibility to increase ease with ADLs. With   [] TE   [] TA   [] neuro   [] other: Patient Education: [x] Review HEP    [] Progressed/Changed HEP based on:   [] positioning   [] body mechanics   [] transfers   [] heat/ice application    [] other:      Other Objective/Functional Measures:   Decreased scap mobility noted and tenderness along inferior angle. Pain Level (0-10 scale) post treatment: 4.5/10    ASSESSMENT/Changes in Function: Empty end feel with PROM. Motion is limited by pain. Therapist did not apply any over pressure with PROM. Patient very limited into shoulder IR.  Patient did well with t-band rows and extension with no pain present, but found shoulder extensions to be challenging. Patient reported a slight increase in pain at end of the session. Patient will continue to benefit from skilled PT services to modify and progress therapeutic interventions, address functional mobility deficits, address ROM deficits, address strength deficits, analyze and address soft tissue restrictions, analyze and cue movement patterns and assess and modify postural abnormalities to attain remaining goals. []  See Plan of Care  []  See progress note/recertification  []  See Discharge Summary         Progress towards goals / Updated goals:  Short Term Goals: To be accomplished in 2 weeks:  1. Pt will be I and compliant with HEP to promote self management of symptoms. not met (7/13/2018)  2. Pt will demonstrate left shoulder AROM WNL in s/l to improve joint mobility for progression of therex. Long Term Goals: To be accomplished in 4 weeks:  1. Pt will demonstrate left shoulder flexion to 140 deg without pain for improved ease of ADLs. 2. Pt will demonstrate left shoulder posterior capsule mobility to 50 deg without pain for improved self care. 3. Pt will demonstrate 4+/5 left shoulder strength testing without pain for improved ease with daily tasks. 4. Pt will report no difficulty reaching a shelf at shoulder height for improved quality of life.     PLAN  []  Upgrade activities as tolerated     [x]  Continue plan of care  []  Update interventions per flow sheet       []  Discharge due to:_  []  Other:_      Evon Davies, PT 7/17/2018  12:23 PM    Future Appointments  Date Time Provider Jacqui Roberts   7/17/2018 12:30 PM Guillermina Ly [facility]   7/19/2018 1:00 PM Nitin [place]ALLIE [facility]   7/20/2018 2:00 PM HBV  RM 1 [facility]   7/24/2018 12:30 PM TOMY MARTINEZ [facility]   7/26/2018 1:00 PM TOMY MARTINEZ [facility]   7/31/2018 12:30 PM Yen Emerson [facility]   8/2/2018 12:30 PM Nitin Reza PTA [facility]   8/16/2018 12:45 PM Dean Samuel MD [facility] None

## 2018-07-19 ENCOUNTER — HOSPITAL ENCOUNTER (OUTPATIENT)
Dept: PHYSICAL THERAPY | Age: 48
Discharge: HOME | End: 2018-07-19
Payer: COMMERCIAL

## 2018-07-19 PROCEDURE — 97140 MANUAL THERAPY 1/> REGIONS: CPT

## 2018-07-19 PROCEDURE — 97110 THERAPEUTIC EXERCISES: CPT

## 2018-07-19 NOTE — PROGRESS NOTES
PT DAILY TREATMENT NOTE     Patient Name: Lara Necessary  Date:2018  : 1970  [x]  Patient  Verified  Payor: Samira Avina / Plan: [employer] / Product Type: HMO /    In time:1:00  Out time:1:37  Total Treatment Time (min): 37  Visit #: 4 of 8    Treatment Area: Pain in left shoulder [M25.512]  Other chronic pain [G89.29]    SUBJECTIVE  Pain Level (0-10 scale): 1/10  Any medication changes, allergies to medications, adverse drug reactions, diagnosis change, or new procedure performed?: [x] No    [] Yes (see summary sheet for update)  Subjective functional status/changes:   [] No changes reported  Pt reports no new complaints of pain. OBJECTIVE    29 min Therapeutic Exercise:  [] See flow sheet :   Rationale: increase ROM and increase strength to improve the patients ability to tolerate ADLs. 8 min Manual Therapy:  PROM to left shoulder, GH/ST mobs   Rationale: decrease pain, increase ROM and increase tissue extensibility to improve tolerance to functional activities. With   [] TE   [] TA   [] neuro   [] other: Patient Education: [x] Review HEP    [] Progressed/Changed HEP based on:   [] positioning   [] body mechanics   [] transfers   [] heat/ice application    [] other:      Other Objective/Functional Measures: Pt muscle guarding with manual treatment requiring vc's to relax. Pain Level (0-10 scale) post treatment: 3/10    ASSESSMENT/Changes in Function: Pt demonstrates good available left shoulder mobility but is still limited at end range. Patient will continue to benefit from skilled PT services to modify and progress therapeutic interventions, address functional mobility deficits, address ROM deficits, address strength deficits, analyze and address soft tissue restrictions, analyze and cue movement patterns and analyze and modify body mechanics/ergonomics to attain remaining goals.      []  See Plan of Care  []  See progress note/recertification  []  See Discharge Summary         Progress towards goals / Updated goals:  Short Term Goals: To be accomplished in 2 weeks:  1. Pt will be I and compliant with HEP to promote self management of symptoms. not met (7/13/2018)  2. Pt will demonstrate left shoulder AROM WNL in s/l to improve joint mobility for progression of therex. Long Term Goals: To be accomplished in 4 weeks:  1. Pt will demonstrate left shoulder flexion to 140 deg without pain for improved ease of ADLs. 2. Pt will demonstrate left shoulder posterior capsule mobility to 50 deg without pain for improved self care. 3. Pt will demonstrate 4+/5 left shoulder strength testing without pain for improved ease with daily tasks. 4. Pt will report no difficulty reaching a shelf at shoulder height for improved quality of life.     PLAN  []  Upgrade activities as tolerated     [x]  Continue plan of care  []  Update interventions per flow sheet       []  Discharge due to:_  []  Other:_      Sintia Erazo PTA 7/19/2018  2:22 PM    Future Appointments  Date Time Provider Jacqui Alejandra   7/20/2018 2:00 PM [facility]  RM 1 [facility]   7/24/2018 12:30 PM BACKTOMY ETIENNE [facility]   7/26/2018 1:00 PM BACKTOMY ETIENNE [facility]   7/31/2018 12:30 PM Keesha Summers [facility]   8/2/2018 12:30 PM Sintia Erazo PTA [facility]   8/16/2018 12:45 PM [facility]

## 2018-07-20 ENCOUNTER — HOSPITAL ENCOUNTER (OUTPATIENT)
Dept: ULTRASOUND IMAGING | Age: 48
Discharge: HOME | End: 2018-07-20
Attending: OBSTETRICS & GYNECOLOGY
Payer: COMMERCIAL

## 2018-07-20 DIAGNOSIS — D25.9 UTERINE LEIOMYOMA, UNSPECIFIED LOCATION: ICD-10-CM

## 2018-07-20 PROCEDURE — 76856 US EXAM PELVIC COMPLETE: CPT

## 2018-07-22 DIAGNOSIS — N83.201 RIGHT OVARIAN CYST: Primary | ICD-10-CM

## 2018-07-22 NOTE — PROGRESS NOTES
2 fibroids seen. Some possible right hemorrhagic cyst.  Will repeat US in 6-8 weeks. US follow-up ordered for 8 weeks.

## 2018-07-24 ENCOUNTER — HOSPITAL ENCOUNTER (OUTPATIENT)
Dept: PHYSICAL THERAPY | Age: 48
Discharge: HOME | End: 2018-07-24
Payer: COMMERCIAL

## 2018-07-24 PROCEDURE — 97110 THERAPEUTIC EXERCISES: CPT

## 2018-07-24 PROCEDURE — 97140 MANUAL THERAPY 1/> REGIONS: CPT

## 2018-07-24 NOTE — PROGRESS NOTES
PT DAILY TREATMENT NOTE     Patient Name: Tyrell Hackett  Date:2018  : 1970  [x]  Patient  Verified  Payor: Agustín Araujo / Plan: [employer] / Product Type: HMO /    In time:12:30  Out time: 1:12  Total Treatment Time (min): 42  Visit #: 5 of 8    Treatment Area: Pain in left shoulder [M25.512]  Other chronic pain [G89.29]    SUBJECTIVE  Pain Level (0-10 scale): 3/10  Any medication changes, allergies to medications, adverse drug reactions, diagnosis change, or new procedure performed?: [x] No    [] Yes (see summary sheet for update)  Subjective functional status/changes:   [] No changes reported  Patient stated that her shoulder feels sore today.  Patient reported that she has noticed some improvement in left shoulder since starting PT.     OBJECTIVE    Modality rationale: PD    Min Type Additional Details    [] Estim:  []Unatt       []IFC  []Premod                        []Other:  []w/ice   []w/heat  Position:  Location:    [] Estim: []Att    []TENS instruct  []NMES                    []Other:  []w/US   []w/ice   []w/heat  Position:  Location:    []  Traction: [] Cervical       []Lumbar                       [] Prone          []Supine                       []Intermittent   []Continuous Lbs:  [] before manual  [] after manual    []  Ultrasound: []Continuous   [] Pulsed                           []1MHz   []3MHz W/cm2:  Location:    []  Iontophoresis with dexamethasone         Location: [] Take home patch   [] In clinic    []  Ice     []  heat  []  Ice massage  []  Laser   []  Anodyne Position:  Location:    []  Laser with stim  []  Other:  Position:  Location:    []  Vasopneumatic Device Pressure:       [] lo [] med [] hi   Temperature: [] lo [] med [] hi   [] Skin assessment post-treatment:  []intact []redness- no adverse reaction    []redness  adverse reaction:     32 min Therapeutic Exercise:  [x] See flow sheet :   Rationale: increase ROM and increase strength to improve the patients ability to perform ADls. 10 min Manual Therapy:  STM to left UT and along vertebral border of scap; Scap mobs, Grade 2 post GHJ mobs;  PROM into IR, flex, scap. Rationale: decrease pain, increase ROM and increase tissue extensibility to increase ease with ADLs. With   [] TE   [] TA   [] neuro   [] other: Patient Education: [x] Review HEP    [] Progressed/Changed HEP based on:   [] positioning   [] body mechanics   [] transfers   [] heat/ice application    [] other:      Other Objective/Functional Measures:   PROM IR: 68deg with mild pain reported    AAROM with pulleys into scaption was WNL. FOTO: 34 ( a 20 point decrease since [facility], however patient subjectively reported improvement since [facility].)   Pain Level (0-10 scale) post treatment: 5.5/10     ASSESSMENT/Changes in Function: Patient has most discomfort at posterior shoulder with passive shoulder flexion and scap. Therapist did not apply any overpressure and did not progress motion further when patient had pain. End feel was empty. Patient demonstrated Namita Speller with pulleys to WNL. Patient reports occasional clicking in shoulder that is uncomfortable. Therapist advised patient to maintain exercises within a range that doesn't cause the clicking in the shoulder. Patient reported increased pain at end of the session, and reported that it was due to the combination of doing everything, not one particular activity. Patient will continue to benefit from skilled PT services to modify and progress therapeutic interventions, address functional mobility deficits, address ROM deficits, address strength deficits, analyze and address soft tissue restrictions, analyze and cue movement patterns and assess and modify postural abnormalities to attain remaining goals. []  See Plan of Care  []  See progress note/recertification  []  See Discharge Summary         Progress towards goals / Updated goals:  Short Term Goals: To be accomplished in 2 weeks:  1.  Pt will be I and compliant with HEP to promote self management of symptoms. not met (7/13/2018)  2. Pt will demonstrate left shoulder AROM WNL in s/l to improve joint mobility for progression of therex. Long Term Goals: To be accomplished in 4 weeks:  1. Pt will demonstrate left shoulder flexion to 140 deg without pain for improved ease of ADLs. 2. Pt will demonstrate left shoulder posterior capsule mobility to 50 deg without pain for improved self care. PROM IR: 68deg with mild pain reported (7/24/18)     3. Pt will demonstrate 4+/5 left shoulder strength testing without pain for improved ease with daily tasks. 4. Pt will report no difficulty reaching a shelf at shoulder height for improved quality of life.     PLAN  []  Upgrade activities as tolerated     [x]  Continue plan of care  []  Update interventions per flow sheet       []  Discharge due to:_  []  Other:_      Anam Almanzar, PT 7/24/2018  12:25 PM    Future Appointments  Date Time Provider Jacqui Lopesi   7/24/2018 12:30 PM Walker Starke [facility]   7/26/2018 1:00 PM TOMY MARTINEZ [facility]   7/31/2018 12:30 PM Thu Lizarraga [facility]   8/2/2018 12:30 PM Katya Caro PTA [facility]   8/16/2018 12:45 PM Betty Sheriff MD [facility] None   9/24/2018 9:30 AM HBV  RM 1 [facility]

## 2018-07-25 ENCOUNTER — TELEPHONE (OUTPATIENT)
Dept: OBGYN CLINIC | Age: 48
End: 2018-07-25

## 2018-07-25 NOTE — TELEPHONE ENCOUNTER
----- Message from Lovely Lancaster MD sent at 7/22/2018  1:07 PM EDT -----  2 fibroids seen. Some possible right hemorrhagic cyst.  Will repeat US in 6-8 weeks. US follow-up ordered for 8 weeks.

## 2018-07-25 NOTE — TELEPHONE ENCOUNTER
Call made to patient using two identifiers name and . Informed patient of US results. Patient verbalized understanding and no further questions were asked.

## 2018-07-26 ENCOUNTER — HOSPITAL ENCOUNTER (OUTPATIENT)
Dept: PHYSICAL THERAPY | Age: 48
Discharge: HOME | End: 2018-07-26
Payer: COMMERCIAL

## 2018-07-26 PROCEDURE — 97110 THERAPEUTIC EXERCISES: CPT

## 2018-07-26 PROCEDURE — 97140 MANUAL THERAPY 1/> REGIONS: CPT

## 2018-07-26 NOTE — PROGRESS NOTES
PT DAILY TREATMENT NOTE     Patient Name: Bree Crowder  Date:2018  : 1970  [x]  Patient  Verified  Payor: Kim Salas / Plan: [employer] / Product Type: HMO /    In time: 1:01  Out time:1:40  Total Treatment Time (min): 39  Visit #: 6 of 8    Treatment Area: Pain in left shoulder [M25.512]  Other chronic pain [G89.29]    SUBJECTIVE  Pain Level (0-10 scale): 0/10  Any medication changes, allergies to medications, adverse drug reactions, diagnosis change, or new procedure performed?: [x] No    [] Yes (see summary sheet for update)  Subjective functional status/changes:   [] No changes reported  Patient stated that her shoulder is doing alright. Patient mentioned to therapist that she accidentally filled out the FOTO incorrectly last session for the first few questions and that is why her score was so much lower then the first score. OBJECTIVE    29 min Therapeutic Exercise:  [x] See flow sheet :   Rationale: increase ROM and increase strength to improve the patients ability to perform ADLs. 10 min Manual Therapy:  Scap mobs, Posterior capsule stretch; gentle post deltoid hsasta, Grade 2 Post GHJ mob; PROM    Rationale: decrease pain, increase ROM and increase tissue extensibility to increase ease with ADls. With   [] TE   [] TA   [] neuro   [] other: Patient Education: [x] Review HEP    [] Progressed/Changed HEP based on:   [] positioning   [] body mechanics   [] transfers   [] heat/ice application    [] other:      Other Objective/Functional Measures:   Left shoulder AROM Flex: 130 deg with 3/10 pain (before [facility] pain was a 6/10); Abd: 120deg. Pain Level (0-10 scale) post treatment: 3/10    ASSESSMENT/Changes in Function: Patient's AROM into flexion did not improve since [facility], but patient stated that her pain is much less then it was originally when she attempted to raise her arm. Patient reported a slight increase in pain at end of the session.       Patient will continue to benefit from skilled PT services to modify and progress therapeutic interventions, address functional mobility deficits, address ROM deficits, address strength deficits, analyze and address soft tissue restrictions, analyze and cue movement patterns and assess and modify postural abnormalities to attain remaining goals. []  See Plan of Care  []  See progress note/recertification  []  See Discharge Summary         Progress towards goals / Updated goals:  Short Term Goals: To be accomplished in 2 weeks:  1. Pt will be I and compliant with HEP to promote self management of symptoms. not met (7/13/2018)  2. Pt will demonstrate left shoulder AROM WNL in s/l to improve joint mobility for progression of therex. Long Term Goals: To be accomplished in 4 weeks:  1. Pt will demonstrate left shoulder flexion to 140 deg without pain for improved ease of ADLs. Left shoulder AROM Flex: 130 deg with 3/10 pain (before [facility] pain was a 6/10) 07/26/18      2. Pt will demonstrate left shoulder posterior capsule mobility to 50 deg without pain for improved self care. PROM IR: 68deg with mild pain reported (7/24/18)                 3. Pt will demonstrate 4+/5 left shoulder strength testing without pain for improved ease with daily tasks. 4. Pt will report no difficulty reaching a shelf at shoulder height for improved quality of life.     PLAN  []  Upgrade activities as tolerated     [x]  Continue plan of care  []  Update interventions per flow sheet       []  Discharge due to:_  []  Other:_      Sindi Brizuela, PT 7/26/2018  1:23 PM    Future Appointments  Date Time Provider Jacqui Roberts   7/31/2018 12:30 [id number]   8/2/2018 12:30 PM Willis Duverney, PTA [facility]   8/16/2018 12:45 PM Karen Nunez MD [facility] None   9/24/2018 9:30 AM [facility] US RM 1 [facility]

## 2018-07-31 ENCOUNTER — HOSPITAL ENCOUNTER (OUTPATIENT)
Dept: PHYSICAL THERAPY | Age: 48
Discharge: HOME | End: 2018-07-31
Payer: COMMERCIAL

## 2018-07-31 PROCEDURE — 97140 MANUAL THERAPY 1/> REGIONS: CPT

## 2018-07-31 PROCEDURE — 97110 THERAPEUTIC EXERCISES: CPT

## 2018-07-31 NOTE — PROGRESS NOTES
PT DAILY TREATMENT NOTE  Patient Name: Bc Pompa Date:2018 : 1970 [x]  Patient  Verified Payor: Anay Hyde / Plan: [address] PT / Product Type: Commerical / In time:12:30  Out time:1:02 Total Treatment Time (min): 32 Visit #: 7 of 8 Treatment Area: Pain in left shoulder [M25.512] Other chronic pain [G89.29] SUBJECTIVE Pain Level (0-10 scale): 0 Any medication changes, allergies to medications, adverse drug reactions, diagnosis change, or new procedure performed?: [x] No    [] Yes (see summary sheet for update) Subjective functional status/changes:   [] No changes reported Pt reports that she still has difficulty reaching behind her back but other motions are improved. OBJECTIVE 24 min Therapeutic Exercise:  [] See flow sheet :  
Rationale: increase ROM and increase strength to improve the patients ability to perform daily tasks and ADLs 8 min Manual Therapy:  STJ mobs left, left shoulder PROM flex/abd/ER&IR/ post/inf GH mobs grade II-III Rationale: increase ROM and increase tissue extensibility to improve ease of ADLs and self care With 
 [] TE 
 [] TA 
 [] neuro 
 [] other: Patient Education: [x] Review HEP [] Progressed/Changed HEP based on:  
[] positioning   [] body mechanics   [] transfers   [] heat/ice application   
[] other:   
 
Other Objective/Functional Measures: progressed A/AROM activities to against gravity with good tolerance noted. Pain Level (0-10 scale) post treatment: 0 
 
ASSESSMENT/Changes in Function: Pt reports that she is improving with motion in sagittal plane. Still limited with FIR motion reporting anterior/proximal shoulder pain. Progress with A/AROM against gravity to improve ADL ease.  
 
Patient will continue to benefit from skilled PT services to modify and progress therapeutic interventions, address functional mobility deficits, address ROM deficits, address strength deficits, analyze and address soft tissue restrictions, analyze and cue movement patterns and analyze and modify body mechanics/ergonomics to attain remaining goals. []  See Plan of Care 
[]  See progress note/recertification 
[]  See Discharge Summary Progress towards goals / Updated goals: 
Short Term Goals: To be accomplished in 2 weeks: 1. Pt will be I and compliant with HEP to promote self management of symptoms. not met (7/13/2018) 2. Pt will demonstrate left shoulder AROM WNL in s/l to improve joint mobility for progression of therex. Long Term Goals: To be accomplished in 4 weeks: 1. Pt will demonstrate left shoulder flexion to 140 deg without pain for improved ease of ADLs. Left shoulder AROM Flex: 130 deg with 3/10 pain (before [facility] pain was a 6/10) 07/26/18 2. Pt will demonstrate left shoulder posterior capsule mobility to 50 deg without pain for improved self care. PROM IR: 68deg with mild pain reported (7/24/18)              
3. Pt will demonstrate 4+/5 left shoulder strength testing without pain for improved ease with daily tasks. 4. Pt will report no difficulty reaching a shelf at shoulder height for improved quality pt reports improved 7/31/18 PLAN [x]  Upgrade activities as tolerated     [x]  Continue plan of care 
[]  Update interventions per flow sheet      
[]  Discharge due to:_ 
[]  Other:_ Da Payment, DPT, CMTPT 7/31/2018  12:30 PM 
 
Future Appointments Date Time Provider Jacqui Roberts 8/2/2018 12:30 PM Leo Bar, PTA [facility]  
8/16/2018 12:45 PM Belkys Gutierrez MD [facility] None 9/24/2018 9:30 AM HBV  RM 1 [facility]

## 2018-08-03 ENCOUNTER — HOSPITAL ENCOUNTER (OUTPATIENT)
Dept: PHYSICAL THERAPY | Age: 48
Discharge: HOME | End: 2018-08-03
Payer: COMMERCIAL

## 2018-08-03 PROCEDURE — 97110 THERAPEUTIC EXERCISES: CPT

## 2018-08-03 PROCEDURE — 97140 MANUAL THERAPY 1/> REGIONS: CPT

## 2018-08-03 NOTE — PROGRESS NOTES
[place] [address]  [place] [address], [address] 
[phone] fax Physical Therapy Progress Note Patient name: Siddhartha De La Cruz Start of Care: 2018 Referral source: Des Vides MD : 1970 Medical Diagnosis: Pain in left shoulder [M25.512] Other chronic pain [G89.29] Onset Date:2 months Treatment Diagnosis: Left shoulder pain Prior Hospitalization: see medical history Provider#: [id number] Medications: Verified on Patient summary List  
 Comorbidities: diabetes, HTN Prior Level of Function: Pt reports full shoulder ROM without pain Visits from Start of Care: 8    Missed Visits: 0 Established Goals:        Excellent         Good         Limited            None 
[x] Increased ROM   []  []  [x]  [] 
[x] Increased Strength  []  []  [x]  [] 
[] Increased Mobility  []  []  []  []  
[x] Decreased Pain   []  [x]  []  [] 
[] Decreased Swelling  []  []  []  [] 
 
Key Functional Changes:  
Short Term Goals: To be accomplished in 2 weeks: 1. Pt will be I and compliant with HEP to promote self management of symptoms. not met (2018) 2. Pt will demonstrate left shoulder AROM WNL in s/l to improve joint mobility for progression of therex. - Not met. 8/3/18 Long Term Goals: To be accomplished in 4 weeks: 1. Pt will demonstrate left shoulder flexion to 140 deg without pain for improved ease of ADLs. Left shoulder AROM Flex: 130 deg with 3/10 pain (before [facility] pain was a 6/10) 18                       
2. Pt will demonstrate left shoulder posterior capsule mobility to 50 deg without pain for improved self care. PROM IR: 68deg with mild pain reported (18)              
3. Pt will demonstrate 4+/5 left shoulder strength testing without pain for improved ease with daily tasks. - Not met Left shoulder strength grossly 4-/5. 8/3/18 4.  Pt will report no difficulty reaching a shelf at shoulder height for improved quality pt reports improved 7/31/18 Updated Goals: to be achieved in 2 weeks: 1. Pt will demonstrate left shoulder AROM WNL in s/l to improve joint mobility for progression of therex. 2. Pt will demonstrate left shoulder flexion to 140 deg without pain for improved ease of ADLs. 3. Pt will report no difficulty reaching a shelf at shoulder height for improved quality ASSESSMENT/RECOMMENDATIONS: Pt reports some improvement in pain free mobility mostly in front of body. Still quite limited with FIR motions with associated pain. Will continue PT for 2 more weeks to improve functional mobility and ADL ease prior to MD f/u. [x]Continue therapy per initial plan/protocol at a frequency of  2 x per week for 2 weeks []Continue therapy with the following recommended changes:_____________________      _____________________________________________________________________ []Discontinue therapy progressing towards or have reached established goals []Discontinue therapy due to lack of appreciable progress towards goals []Discontinue therapy due to lack of attendance or compliance []Await Physician's recommendations/decisions regarding therapy []Other:________________________________________________________________ Thank you for this referral.   
Yifan Lassiter DPT, CMTPT 8/3/2018 12:49 PM 
NOTE TO PHYSICIAN:  PLEASE COMPLETE THE ORDERS BELOW AND  
FAX TO [facility]: [phone] If you are unable to process this request in 24 hours please contact our office: [phone] ? I have read the above report and request that my patient continue as recommended. ? I have read the above report and request that my patient continue therapy with the following changes/special instructions:__________________________________________________________ ? I have read the above report and request that my patient be discharged from therapy.  
 
Physicians signature: ______________________________Date: ______Time:______

## 2018-08-03 NOTE — PROGRESS NOTES
PT DAILY TREATMENT NOTE 12 Patient Name: Zuleyka Fournier Date:8/3/2018 : 1970 [x]  Patient  Verified Payor: [employer] Comment / Plan: [address] PT / Product Type: Commerical / In time:11:30  Out time:11:58 Total Treatment Time (min): 28 Visit #: 8 of 8 Treatment Area: Pain in left shoulder [M25.512] Other chronic pain [G89.29] SUBJECTIVE Pain Level (0-10 scale): 1/10 Any medication changes, allergies to medications, adverse drug reactions, diagnosis change, or new procedure performed?: [x] No    [] Yes (see summary sheet for update) Subjective functional status/changes:   [] No changes reported Pt reports no new complaints of pain. OBJECTIVE 18 min Therapeutic Exercise:  [] See flow sheet :  
Rationale: increase ROM and increase strength to improve the patients ability to tolerate ADLs. 10 min Manual Therapy:  STJ mobs; PROM to left shoulder; inferior posterior GHJ mobs Rationale: decrease pain, increase ROM and increase tissue extensibility to improve tolerance to ADLs. With 
 [] TE 
 [] TA 
 [] neuro 
 [] other: Patient Education: [x] Review HEP [] Progressed/Changed HEP based on:  
[] positioning   [] body mechanics   [] transfers   [] heat/ice application   
[] other:   
 
Other Objective/Functional Measures: Left shoulder strength grossly 4-/5. Pain Level (0-10 scale) post treatment: 0/10 ASSESSMENT/Changes in Function: Pt demonstrates improved left shoulder mobility. Pt has made some progress toward functional goals and can benefit from continued PT 2x/2 weeks to progress toward long term goals.   
 
Patient will continue to benefit from skilled PT services to modify and progress therapeutic interventions, address functional mobility deficits, address ROM deficits, address strength deficits, analyze and address soft tissue restrictions, analyze and cue movement patterns and analyze and modify body mechanics/ergonomics to attain remaining goals. []  See Plan of Care 
[]  See progress note/recertification 
[]  See Discharge Summary Progress towards goals / Updated goals: 
Short Term Goals: To be accomplished in 2 weeks: 1. Pt will be I and compliant with HEP to promote self management of symptoms. not met (7/13/2018) 2. Pt will demonstrate left shoulder AROM WNL in s/l to improve joint mobility for progression of therex. - Not met. 8/3/18 Long Term Goals: To be accomplished in 4 weeks: 1. Pt will demonstrate left shoulder flexion to 140 deg without pain for improved ease of ADLs. Left shoulder AROM Flex: 130 deg with 3/10 pain (before SOC pain was a 6/10) 07/26/18                       
2. Pt will demonstrate left shoulder posterior capsule mobility to 50 deg without pain for improved self care. PROM IR: 68deg with mild pain reported (7/24/18)              
3. Pt will demonstrate 4+/5 left shoulder strength testing without pain for improved ease with daily tasks. - Not met Left shoulder strength grossly 4-/5. 8/3/18 4. Pt will report no difficulty reaching a shelf at shoulder height for improved quality pt reports improved 7/31/18 PLAN 
[]  Upgrade activities as tolerated     [x]  Continue plan of care 
[]  Update interventions per flow sheet      
[]  Discharge due to:_ 
[]  Other:_ Lola Harvey PTA 8/3/2018  11:45 AM 
 
Future Appointments Date Time Provider Jacqui Roberts 8/16/2018 12:45 PM Jonel Andersen MD [facility] None 9/24/2018 9:30 AM [facility] US RM 1 [facility]

## 2018-08-07 DIAGNOSIS — J30.1 SEASONAL ALLERGIC RHINITIS DUE TO POLLEN: ICD-10-CM

## 2018-08-08 ENCOUNTER — HOSPITAL ENCOUNTER (OUTPATIENT)
Dept: PHYSICAL THERAPY | Age: 48
Discharge: HOME | End: 2018-08-08
Payer: COMMERCIAL

## 2018-08-08 PROCEDURE — 97110 THERAPEUTIC EXERCISES: CPT

## 2018-08-08 PROCEDURE — 97140 MANUAL THERAPY 1/> REGIONS: CPT

## 2018-08-08 RX ORDER — FLUTICASONE PROPIONATE 50 MCG
SPRAY, SUSPENSION (ML) NASAL
Qty: 1 BOTTLE | Refills: 5 | Status: SHIPPED | OUTPATIENT
Start: 2018-08-08 | End: 2019-11-06 | Stop reason: SDUPTHER

## 2018-08-08 NOTE — PROGRESS NOTES
PT DAILY TREATMENT NOTE     Patient Name: Jcarlos Zhou  Date:2018  : 1970  [x]  Patient  Verified  Payor: Seth Garcia / [facility] / Product Type: Commerical /    In time:11:30  Out time:11:58  Total Treatment Time (min): 28  Visit #: 1 of 4    Treatment Area: Pain in left shoulder [M25.512]  Other chronic pain [G89.29]    SUBJECTIVE  Pain Level (0-10 scale): 0/10  Any medication changes, allergies to medications, adverse drug reactions, diagnosis change, or new procedure performed?: [x] No    [] Yes (see summary sheet for update)  Subjective functional status/changes:   [] No changes reported  Pt reports no new complaints. Pt reports compliance with HEP. OBJECTIVE    20 min Therapeutic Exercise:  [x] See flow sheet :   Rationale: increase ROM and increase strength to improve the patients ability to tolerate ADLs. 8 min Manual Therapy:  PROM to left shoulder; ST mobility. Rationale: decrease pain, increase ROM and increase tissue extensibility to improve tolerance to ADLs. With   [] TE   [] TA   [] neuro   [] other: Patient Education: [x] Review HEP    [] Progressed/Changed HEP based on:   [] positioning   [] body mechanics   [] transfers   [] heat/ice application    [] other:      Other Objective/Functional Measures: Pain with end range PROM shoulder flexion and abduction. Pain Level (0-10 scale) post treatment: 0/10    ASSESSMENT/Changes in Function: Pt demonstrates fair tolerance to over head activities due to c/o pain. Patient will continue to benefit from skilled PT services to modify and progress therapeutic interventions, address functional mobility deficits, address ROM deficits, address strength deficits, analyze and address soft tissue restrictions and analyze and cue movement patterns to attain remaining goals.      []  See Plan of Care  []  See progress note/recertification  []  See Discharge Summary         Progress towards goals / Updated goals:     Updated Goals: to be achieved in 2 weeks:  1. Pt will demonstrate left shoulder AROM WNL in s/l to improve joint mobility for progression of therex. 2. Pt will demonstrate left shoulder flexion to 140 deg without pain for improved ease of ADLs.   3. Pt will report no difficulty reaching a shelf at shoulder height for improved quality    PLAN  []  Upgrade activities as tolerated     [x]  Continue plan of care  []  Update interventions per flow sheet       []  Discharge due to:_  []  Other:_      Rakesh Lucia, PTA 8/8/2018  11:42 AM    Future Appointments  Date Time Provider Jacqui Roberts   8/9/2018 10:30 AM Dimple Fester [facility]   8/14/2018 9:30 AM Rakesh Myers, PTA [facility]   8/16/2018 11:00 AM Dimple Fesarpita [facility]   8/16/2018 12:45 PM Tayla Parry MD [facility] None   9/24/2018 9:30 AM HBV US RM 1 [facility]

## 2018-08-09 ENCOUNTER — HOSPITAL ENCOUNTER (OUTPATIENT)
Dept: PHYSICAL THERAPY | Age: 48
Discharge: HOME | End: 2018-08-09
Payer: COMMERCIAL

## 2018-08-09 PROCEDURE — 97140 MANUAL THERAPY 1/> REGIONS: CPT

## 2018-08-09 PROCEDURE — 97110 THERAPEUTIC EXERCISES: CPT

## 2018-08-09 NOTE — PROGRESS NOTES
PT DAILY TREATMENT NOTE 3-16    Patient Name: Jannet Guzman  Date:2018  : 1970  [x]  Patient  Verified  Payor: Rocky Erickson / [facility] / Product Type: Commerical /    In time:10:30  Out time:10:57  Total Treatment Time (min): 27  Visit #: 2 of 4    Treatment Area: Pain in left shoulder [M25.512]  Other chronic pain [G89.29]    SUBJECTIVE  Pain Level (0-10 scale): 0  Any medication changes, allergies to medications, adverse drug reactions, diagnosis change, or new procedure performed?: [x] No    [] Yes (see summary sheet for update)  Subjective functional status/changes:   [] No changes reported  \"It's a little better. \"    OBJECTIVE  19 min Therapeutic Exercise:  [x] See flow sheet :   Rationale: increase ROM, increase strength and improve coordination to improve the patients ability to increase ease with ADLs    8 min Manual Therapy:  Left STJ mobs, PROM left shoulder ( all planes)   Rationale: decrease pain, increase ROM and increase tissue extensibility to ease ADL tolerance    With   [] TE   [] TA   [] neuro   [] other: Patient Education: [x] Review HEP    [] Progressed/Changed HEP based on:   [] positioning   [] body mechanics   [] transfers   [] heat/ice application    [] other:      Other Objective/Functional Measures:   Left shoulder flexion AROM: 170 degrees     Pain Level (0-10 scale) post treatment: 1    ASSESSMENT/Changes in Function:   ROM progressing nicely. Continue with remaining visits progressing towards discharge with updated HEP. Patient will continue to benefit from skilled PT services to modify and progress therapeutic interventions, address functional mobility deficits, address ROM deficits, address strength deficits, analyze and address soft tissue restrictions, analyze and cue movement patterns, analyze and modify body mechanics/ergonomics and assess and modify postural abnormalities to attain remaining goals.      []  See Plan of Care  []  See progress note/recertification  []  See Discharge Summary         Updated Goals: to be achieved in 2 weeks:  1. Pt will demonstrate left shoulder AROM WNL in s/l to improve joint mobility for progression of therex. met (8/9/2018)  2. Pt will demonstrate left shoulder flexion to 140 deg without pain for improved ease of ADLs. -170 degrees with slight c/o pain at end range (8/9/2018)  3.  Pt will report no difficulty reaching a shelf at shoulder height for improved quality       PLAN  []  Upgrade activities as tolerated     [x]  Continue plan of care  []  Update interventions per flow sheet       []  Discharge due to:_  []  Other:_      Diamante Garcia 8/9/2018  10:33 AM    Future Appointments  Date Time Provider Jacqui Roberts   8/14/2018 9:30 AM Ann Silva PTA [facility]   8/16/2018 11:00 AM Diamante Garcia [facility]   8/16/2018 12:45 PM Fredrick Marks MD [facility] None   9/24/2018 9:30 AM HBV  RM 1 [facility]

## 2018-08-14 ENCOUNTER — HOSPITAL ENCOUNTER (OUTPATIENT)
Dept: PHYSICAL THERAPY | Age: 48
Discharge: HOME | End: 2018-08-14
Payer: COMMERCIAL

## 2018-08-14 PROCEDURE — 97140 MANUAL THERAPY 1/> REGIONS: CPT

## 2018-08-14 PROCEDURE — 97110 THERAPEUTIC EXERCISES: CPT

## 2018-08-14 NOTE — PROGRESS NOTES
PT DAILY TREATMENT NOTE     Patient Name: Devon Josue  Date:2018  : 1970  [x]  Patient  Verified  Payor: Freda Hayes / [facility] / Product Type: Commerical /    In time:9:30  Out time:10:06  Total Treatment Time (min): 36  Visit #: 3 of 4    Treatment Area: Pain in left shoulder [M25.512]  Other chronic pain [G89.29]    SUBJECTIVE  Pain Level (0-10 scale): 0.5/10  Any medication changes, allergies to medications, adverse drug reactions, diagnosis change, or new procedure performed?: [x] No    [] Yes (see summary sheet for update)  Subjective functional status/changes:   [] No changes reported  Pt reports no new complaints. Pt reports minimal c/o pain. OBJECTIVE    28 min Therapeutic Exercise:  [] See flow sheet :   Rationale: increase ROM and increase strength to improve the patients ability to tolerate ADLs. 8 min Manual Therapy:  PROM to left shoulder; ST/GHJ mobs   Rationale: decrease pain, increase ROM and increase tissue extensibility to improve tolerance to ADLs. With   [] TE   [] TA   [] neuro   [] other: Patient Education: [x] Review HEP    [] Progressed/Changed HEP based on:   [] positioning   [] body mechanics   [] transfers   [] heat/ice application    [] other:      Other Objective/Functional Measures: FOTO: 61     Pain Level (0-10 scale) post treatment: 2.5/10    ASSESSMENT/Changes in Function: Pt continues to have good left shoulder PROM but is limited due to pain at end range. Patient will continue to benefit from skilled PT services to modify and progress therapeutic interventions, address functional mobility deficits, address ROM deficits, address strength deficits, analyze and address soft tissue restrictions, analyze and cue movement patterns and analyze and modify body mechanics/ergonomics to attain remaining goals.      []  See Plan of Care  []  See progress note/recertification  []  See Discharge Summary         Progress towards goals / Updated goals:  Updated Goals: to be achieved in 2 weeks:  1. Pt will demonstrate left shoulder AROM WNL in s/l to improve joint mobility for progression of therex. met (8/9/2018)  2. Pt will demonstrate left shoulder flexion to 140 deg without pain for improved ease of ADLs. -170 degrees with slight c/o pain at end range (8/9/2018)  3.  Pt will report no difficulty reaching a shelf at shoulder height for improved quality    PLAN  []  Upgrade activities as tolerated     [x]  Continue plan of care  []  Update interventions per flow sheet       []  Discharge due to:_  []  Other:_      Lauren Ospina, PTA 8/14/2018  10:13 AM    Future Appointments  Date Time Provider Jacqui Roberts   8/16/2018 11:00 AM Kath Glass [id number] [facility]   8/16/2018 12:45 PM Rock Priyanka MD [facility] None   9/24/2018 9:30 AM [facility] US RM 1 [facility]

## 2018-08-16 ENCOUNTER — HOSPITAL ENCOUNTER (OUTPATIENT)
Dept: PHYSICAL THERAPY | Age: 48
Discharge: HOME | End: 2018-08-16
Payer: COMMERCIAL

## 2018-08-16 ENCOUNTER — OFFICE VISIT (OUTPATIENT)
Dept: FAMILY MEDICINE CLINIC | Age: 48
End: 2018-08-16

## 2018-08-16 VITALS
HEART RATE: 63 BPM | TEMPERATURE: 98.2 F | BODY MASS INDEX: 35.36 KG/M2 | DIASTOLIC BLOOD PRESSURE: 89 MMHG | HEIGHT: 66 IN | SYSTOLIC BLOOD PRESSURE: 169 MMHG | WEIGHT: 220 LBS | RESPIRATION RATE: 14 BRPM

## 2018-08-16 DIAGNOSIS — K59.09 CHRONIC CONSTIPATION: ICD-10-CM

## 2018-08-16 DIAGNOSIS — I10 ESSENTIAL HYPERTENSION: Primary | ICD-10-CM

## 2018-08-16 PROBLEM — E66.01 SEVERE OBESITY (BMI 35.0-39.9): Status: ACTIVE | Noted: 2018-08-16

## 2018-08-16 PROCEDURE — 97140 MANUAL THERAPY 1/> REGIONS: CPT

## 2018-08-16 PROCEDURE — 97110 THERAPEUTIC EXERCISES: CPT

## 2018-08-16 RX ORDER — HYDROCHLOROTHIAZIDE 12.5 MG/1
12.5 TABLET ORAL DAILY
Qty: 30 TAB | Refills: 5 | Status: SHIPPED | OUTPATIENT
Start: 2018-08-16 | End: 2019-01-29 | Stop reason: SDUPTHER

## 2018-08-16 RX ORDER — LISINOPRIL 40 MG/1
40 TABLET ORAL DAILY
Qty: 30 TAB | Refills: 5 | Status: SHIPPED | OUTPATIENT
Start: 2018-08-16 | End: 2019-01-29 | Stop reason: SDUPTHER

## 2018-08-16 RX ORDER — POLYETHYLENE GLYCOL 3350 17 G/17G
17 POWDER, FOR SOLUTION ORAL DAILY
Qty: 510 G | Refills: 12 | Status: SHIPPED | OUTPATIENT
Start: 2018-08-16 | End: 2021-04-21

## 2018-08-16 NOTE — MR AVS SNAPSHOT
[facility] 
 
 
 [address] [facility] [place] [address] [phone] Patient: Drake Petit MRN: [id number] ATI:[id number] Visit Information Date & Time Provider Department Dept. Phone Encounter #  
 8/16/2018 12:45 PM Betty Sheriff MD [address] [phone] [id number] Upcoming Health Maintenance Date Due  
 FOOT EXAM Q1 11/15/1979 Influenza Age 5 to Adult 8/1/2018 PAP AKA CERVICAL CYTOLOGY 9/28/2018 HEMOGLOBIN A1C Q6M 1/5/2019 MICROALBUMIN Q1 1/10/2019 EYE EXAM RETINAL OR DILATED Q1 1/11/2019 LIPID PANEL Q1 7/5/2019 DTaP/Tdap/Td series (2 - Td) 5/17/2027 Allergies as of 8/16/2018  Review Complete On: 8/16/2018 By: Betty Sheriff MD  
  
 Severity Noted Reaction Type Reactions Codeine High 08/01/2012    Nausea Only, Other (comments) Sweats felt like she was going to pass out Procardia [Nifedipine] High 06/07/2011    Shortness of Breath, Nausea and Vomiting, Other (comments) Tightness in chest on 7/6/1990 Sulfa (Sulfonamide Antibiotics) High 06/07/2011    Rash Tylox [Oxycodone-acetaminophen] High 08/01/2012    Nausea Only, Other (comments) Sweats, felt like she was going to pass out Current Immunizations  Never Reviewed Name Date Influenza Vaccine 10/16/2015 Influenza Vaccine Split 10/14/2012 Not reviewed this visit You Were Diagnosed With   
  
 Codes Comments Essential hypertension    -  Primary ICD-10-CM: I10 
ICD-9-CM: 401.9 Vitals BP Pulse Temp Resp Height(growth percentile) Weight(growth percentile) 169/89 63 98.2 °F (36.8 °C) (Oral) 14 5' 6\" (1.676 m) 220 lb (99.8 kg) LMP BMI OB Status Smoking Status 07/29/2018 35.51 kg/m2 Having regular periods Never Smoker Vitals History BMI and BSA Data Body Mass Index Body Surface Area 35.51 kg/m 2 2.16 m 2 Preferred Pharmacy Pharmacy Name Phone [address] [place], [address] [place] [address] [phone] Your Updated Medication List  
  
   
This list is accurate as of 8/16/18  2:19 PM.  Always use your most recent med list.  
  
  
  
  
 Blood-Glucose Meter monitoring kit Test blood sugar once a day .00 One Touch Ultra Mini Glucose Meter  
  
 cyanocobalamin 1,000 mcg tablet Take 1,000 mcg by mouth daily. fluticasone 50 mcg/actuation nasal spray Commonly known as:  FLONASE  
USE 2 SPRAYS IN EACH NOSTRIL DAILY  
  
 glucose blood VI test strips strip Commonly known as:  ASCENSIA AUTODISC VI, ONE TOUCH ULTRA TEST VI Test blood sugar once daily .00 One Touch Ultra Mini Test strip  
  
 hydroCHLOROthiazide 12.5 mg tablet Commonly known as:  HYDRODIURIL Take 1 Tab by mouth daily. JANUMET  mg per tablet Generic drug:  SITagliptin-metFORMIN  
TAKE ONE TABLET BY MOUTH TWICE DAILY WITH MEALS Lancets Misc Use with One Touch Ultra Mini Glucose Meter test blood sugar once daily .00  
  
 lisinopril 40 mg tablet Commonly known as:  Stacy Onekama Take 1 Tab by mouth daily. meclizine 12.5 mg tablet Commonly known as:  ANTIVERT Take 1 Tab by mouth three (3) times daily as needed. MULTI-VITAMIN PO Take  by mouth daily. naproxen 500 mg tablet Commonly known as:  NAPROSYN  
TAKE ONE TABLET BY MOUTH TWICE DAILY WITH MEALS  
  
 ondansetron 8 mg disintegrating tablet Commonly known as:  ZOFRAN ODT Take 1 Tab by mouth every eight (8) hours as needed for Nausea. polyethylene glycol 17 gram/dose powder Commonly known as:  Bishop Jm Take 17 g by mouth daily. 1 tablespoon with 8 oz of water daily SLOW RELEASE IRON 140 mg (45 mg iron) Tber ER tablet Generic drug:  ferrous sulfate Take  by mouth every other day. triamcinolone acetonide 0.1 % topical cream  
Commonly known as:  KENALOG  
APPLY A THIN LAYER TO AFFECTED AREA TWICE DAILY VENTOLIN HFA 90 mcg/actuation inhaler Generic drug:  albuterol INHALE TWO PUFFS BY MOUTH EVERY 4 HOURS AS NEEDED FOR WHEEZING  
  
 VITAMIN C 500 mg tablet Generic drug:  ascorbic acid (vitamin C) Take 500 mg by mouth daily. Prescriptions Sent to Pharmacy Refills  
 lisinopril (PRINIVIL, ZESTRIL) 40 mg tablet 5 Sig: Take 1 Tab by mouth daily. Class: Normal  
 Pharmacy: [facility] DR CARL EDOUARD [address] [place] [address] Ph #: [phone] Route: Oral  
 hydroCHLOROthiazide (HYDRODIURIL) 12.5 mg tablet 5 Sig: Take 1 Tab by mouth daily. Class: Normal  
 Pharmacy: [facility] DR CARL EDOUARD [address] [place] [address] Ph #: [phone] Route: Oral  
  
To-Do List   
 09/24/2018 9:30 AM  
  Appointment with [facility] 1 at [address] [place] [address] ([phone]) OUTSIDE FILMS  - Any outside films related to the study being scheduled should be brought with you on the day of the exam.  If this cannot be done there may be a delay in the reading of the study. MEDICATIONS  - Patient must bring a complete list of all medications currently taking to include prescriptions, over-the-counter meds, herbals, vitamins & any dietary supplements  GENERAL -Patient must drink 32 ounces of water 1 hour prior to the exam.  
  
  
Patient Instructions Please contact our office if you have any questions about your visit today. Introducing [facility]! Dear Izabella Estevez: 
Thank you for requesting a [employer] account. Our records indicate that you already have an active [employer] account. You can access your account anytime at [email or web address] Did you know that you can access your hospital and ER discharge instructions at any time in [employer]? You can also review all of your test results from your hospital stay or ER visit. Additional Information If you have questions, please visit the Frequently Asked Questions section of the [employer] website at [email or web address] Remember, [employer] is NOT to be used for urgent needs. For medical emergencies, dial 911. Now available from your iPhone and Android! Please provide this summary of care documentation to your next provider. Your primary care clinician is listed as Jcarlos Hash. If you have any questions after today's visit, please call [phone].

## 2018-08-16 NOTE — PROGRESS NOTES
HISTORY OF PRESENT ILLNESS  Zeina Solo is a 49 y.o. female. Chief Complaint   Patient presents with    Follow-up    Hypertension       HPI  Patient is here for a  follow up of HTN. Home readings have been variable. However, her systolic readings tend to be over 140. Patient does need medication refills today. New concerns today: pt completed PT today for her L shoulder pain. She does note some improvement and is considering buying a TENS unit for home use. ROS  Review of Systems   Constitutional: Negative. HENT: Negative. Respiratory: Negative. Cardiovascular: Negative. All other systems reviewed and are negative. Physical Exam  Physical Exam   Nursing note and vitals reviewed. Constitutional: She is oriented to person, place, and time. She appears well-developed and well-nourished. HENT:   Head: Normocephalic and atraumatic. Right Ear: External ear normal.   Left Ear: External ear normal.   Nose: Nose normal.   Eyes: Conjunctivae and EOM are normal.   Neck: Normal range of motion. Neck supple. No JVD present. Carotid bruit is not present. No thyromegaly present. Cardiovascular: Normal rate, regular rhythm, normal heart sounds and intact distal pulses. Exam reveals no gallop and no friction rub. No murmur heard. Pulmonary/Chest: Effort normal and breath sounds normal. She has no wheezes. She has no rhonchi. She has no rales. Abdominal: Soft. Bowel sounds are normal.   Musculoskeletal: Normal range of motion. Neurological: She is alert and oriented to person, place, and time. Coordination normal.   Skin: Skin is warm and dry. Psychiatric: She has a normal mood and affect. Her behavior is normal. Judgment and thought content normal.     ASSESSMENT and PLAN  Diagnoses and all orders for this visit:    1. Essential hypertension  -    increase lisinopril (PRINIVIL, ZESTRIL) to 40 mg tablet; Take 1 Tab by mouth daily.   -     hydroCHLOROthiazide (HYDRODIURIL) 12.5 mg tablet; Take 1 Tab by mouth daily. Pt to monitor home bp. Return if not within nl parameters. 2. Chronic constipation  -     polyethylene glycol (MIRALAX) 17 gram/dose powder; Take 17 g by mouth daily.  1 tablespoon with 8 oz of water daily      Follow-up Disposition: 3 months; sooner prn

## 2018-08-16 NOTE — PROGRESS NOTES
Bree Crowder presents today for   Chief Complaint   Patient presents with    Follow-up    Hypertension       Christian IRIS Danial preferred language for health care discussion is english/other. Is someone accompanying this pt? no    Is the patient using any DME equipment during [address]? No     Depression Screening:  PHQ over the last two weeks 8/16/2018   PHQ Not Done Active Diagnosis of Depression or Bipolar Disorder   Little interest or pleasure in doing things Not at all   Feeling down, depressed, irritable, or hopeless Not at all   Total Score PHQ 2 0   Trouble falling or staying asleep, or sleeping too much Not at all   Feeling tired or having little energy Not at all   Poor appetite, weight loss, or overeating Not at all   Feeling bad about yourself - or that you are a failure or have let yourself or your family down Not at all   Trouble concentrating on things such as school, work, reading, or watching TV Not at all   Moving or speaking so slowly that other people could have noticed; or the opposite being so fidgety that others notice Not at all   Thoughts of being better off dead, or hurting yourself in some way Not at all   PHQ 9 Score 0   How difficult have these problems made it for you to do your work, take care of your home and get along with others Not difficult at all       Learning Assessment:  Learning Assessment 8/16/2018   PRIMARY LEARNER Patient   HIGHEST LEVEL OF EDUCATION - PRIMARY LEARNER  3 YEARS [address] CAREGIVER No   PRIMARY LANGUAGE ENGLISH   LEARNER PREFERENCE PRIMARY DEMONSTRATION   ANSWERED BY patient   RELATIONSHIP SELF       Abuse Screening:  Abuse Screening Questionnaire 8/16/2018   Do you ever feel afraid of your partner? N   Are you in a relationship with someone who physically or mentally threatens you? N   Is it safe for you to go home? Y       Health Maintenance reviewed and discussed and ordered per Provider.     Health Maintenance Due   Topic Date Due    FOOT EXAM Q1  11/15/1979    Influenza Age 5 to Adult  08/01/2018    PAP AKA CERVICAL CYTOLOGY  09/28/2018   . Lisa Zacarias is updated on all         Coordination of Care:  1. Have you been to the ER, urgent care clinic since your last visit? Hospitalized since your last visit? no    2. Have you seen or consulted any other health care providers outside of the [address] [place] [address] since your last visit? Include any pap smears or colon screening.  no

## 2018-08-16 NOTE — PROGRESS NOTES
PT DAILY TREATMENT NOTE 3-16    Patient Name: Kim Bonilla  Date:2018  : 1970  [x]  Patient  Verified  Payor: Chel Lancaster / Plan[facility] [employer]  [facility] / Product Type: Commerical /    In time:11:00  Out time:11:30  Total Treatment Time (min): 30  Visit #: 4 of 4    Treatment Area: Pain in left shoulder [M25.512]  Other chronic pain [G89.29]    SUBJECTIVE  Pain Level (0-10 scale): 0  Any medication changes, allergies to medications, adverse drug reactions, diagnosis change, or new procedure performed?: [x] No    [] Yes (see summary sheet for update)  Subjective functional status/changes:   [] No changes reported  \"It feels fine right now. \"    OBJECTIVE  22 min Therapeutic Exercise:  [x] See flow sheet :   Rationale: increase ROM, increase strength and improve coordination to improve the patients ability to increase ease with ADLs    8 min Manual Therapy:  Left scap mobs, PROM left shoulder (all planes)   Rationale: decrease pain, increase ROM and increase tissue extensibility to ease ADL tolerance           With   [] TE   [] TA   [] neuro   [] other: Patient Education: [x] Review HEP    [] Progressed/Changed HEP based on:   [] positioning   [] body mechanics   [] transfers   [] heat/ice application    [] other:      Other Objective/Functional Measures:   Shoulder ROM WNL  though presents with minimal muscle guarding at end ranges    Pain Level (0-10 scale) post treatment: 2    ASSESSMENT/Changes in Function:   Patient is discharged today with updated HEP. Patient will continue to benefit from skilled PT services to modify and progress therapeutic interventions, address functional mobility deficits, address ROM deficits, address strength deficits, analyze and address soft tissue restrictions, analyze and cue movement patterns, analyze and modify body mechanics/ergonomics and assess and modify postural abnormalities to attain remaining goals.      []  See Plan of Care  []  See progress note/recertification  [x]  See Discharge Summary         Progress towards goals / Updated goals:  Updated Goals: to be achieved in 2 weeks:  1. Pt will demonstrate left shoulder AROM WNL in s/l to improve joint mobility for progression of therex. met (8/9/2018)  2. Pt will demonstrate left shoulder flexion to 140 deg without pain for improved ease of ADLs. -170 degrees with slight c/o pain at end range (8/9/2018)  3.  Pt will report no difficulty reaching a shelf at shoulder height for improved qualitypatient reports no difficulty putting dishes away (8/16/2018)    PLAN  []  Upgrade activities as tolerated     []  Continue plan of care  []  Update interventions per flow sheet       [x]  Discharge  []  Other:_      Amanda Hemp 8/16/2018  10:49 AM    Future Appointments  Date Time Provider Jacqui Roberts   8/16/2018 11:00 AM Kath Maria Luisa E Sue [address] [facility] [facility]   8/16/2018 12:45 PM Jonel Andersen MD [facility] None   9/24/2018 9:30 AM [facility]  RM 1 [facility]

## 2018-08-17 NOTE — PROGRESS NOTES
[facility] [address]  [place], [address]  [phone] fax    Physical Therapy Discharge Summary  Patient name: Florencia Rincon Start of Care: 2018   Referral source: Nimo Walls MD : 1970                          Medical Diagnosis: Pain in left shoulder [M25.512]  Other chronic pain [G89.29] Onset Date:2 months                          Treatment Diagnosis: Left shoulder pain   Prior Hospitalization: see medical history Provider#: [id number]   Medications: Verified on Patient summary List    Comorbidities: diabetes, HTN   Prior Level of Function: Pt reports full shoulder ROM without pain  Visits from Start of Care: 12    Missed Visits: 0  Reporting Period : 8/3/2018 to 2018      Summary of Care:  Updated Goals: to be achieved in 2 weeks:  1. Pt will demonstrate left shoulder AROM WNL in s/l to improve joint mobility for progression of therex. met (2018)  2. Pt will demonstrate left shoulder flexion to 140 deg without pain for improved ease of ADLs. -170 degrees with slight c/o pain at end range (2018)  3. Pt will report no difficulty reaching a shelf at shoulder height for improved qualitypatient reports no difficulty putting dishes away (2018)    ASSESSMENT/RECOMMENDATIONS: Pt has progressed well with shoulder ROM and functional task performance. She will D/C to HEP at this time.     [x]Discontinue therapy: [x]Patient has reached or is progressing toward set goals      []Patient is non-compliant or has abdicated      []Due to lack of appreciable progress towards set goals    Dov Vega DPT, CMTPT 2018 11:41 AM

## 2018-09-24 ENCOUNTER — HOSPITAL ENCOUNTER (OUTPATIENT)
Dept: ULTRASOUND IMAGING | Age: 48
Discharge: HOME | End: 2018-09-24
Attending: OBSTETRICS & GYNECOLOGY
Payer: COMMERCIAL

## 2018-09-24 DIAGNOSIS — N83.201 RIGHT OVARIAN CYST: ICD-10-CM

## 2018-09-24 PROCEDURE — 76856 US EXAM PELVIC COMPLETE: CPT

## 2018-10-16 ENCOUNTER — OFFICE VISIT (OUTPATIENT)
Dept: OBGYN CLINIC | Age: 48
End: 2018-10-16

## 2018-10-16 DIAGNOSIS — Z12.4 CERVICAL CANCER SCREENING: ICD-10-CM

## 2018-10-16 DIAGNOSIS — Z01.419 WELL WOMAN EXAM WITH ROUTINE GYNECOLOGICAL EXAM: Primary | ICD-10-CM

## 2018-10-16 NOTE — PATIENT INSTRUCTIONS
Well Visit, Ages 25 to 48: Care Instructions Your Care Instructions Physical exams can help you stay healthy. Your doctor has checked your overall health and may have suggested ways to take good care of yourself. He or she also may have recommended tests. At home, you can help prevent illness with healthy eating, regular exercise, and other steps. Follow-up care is a key part of your treatment and safety. Be sure to make and go to all appointments, and call your doctor if you are having problems. It's also a good idea to know your test results and keep a list of the medicines you take. How can you care for yourself at home? · Reach and stay at a healthy weight. This will lower your risk for many problems, such as obesity, diabetes, heart disease, and high blood pressure. · Get at least 30 minutes of physical activity on most days of the week. Walking is a good choice. You also may want to do other activities, such as running, swimming, cycling, or playing tennis or team sports. Discuss any changes in your exercise program with your doctor. · Do not smoke or allow others to smoke around you. If you need help quitting, talk to your doctor about stop-smoking programs and medicines. These can increase your chances of quitting for good. · Talk to your doctor about whether you have any risk factors for sexually transmitted infections (STIs). Having one sex partner (who does not have STIs and does not have sex with anyone else) is a good way to avoid these infections. · Use birth control if you do not want to have children at this time. Talk with your doctor about the choices available and what might be best for you. · Protect your skin from too much sun. When you're outdoors from 10 a.m. to 4 p.m., stay in the shade or cover up with clothing and a hat with a wide brim. Wear sunglasses that block UV rays. Even when it's cloudy, put broad-spectrum sunscreen (SPF 30 or higher) on any exposed skin. · See a dentist one or two times a year for checkups and to have your teeth cleaned. · Wear a seat belt in the car. · Drink alcohol in moderation, if at all. That means no more than 2 drinks a day for men and 1 drink a day for women. Follow your doctor's advice about when to have certain tests. These tests can spot problems early. For everyone · Cholesterol. Have the fat (cholesterol) in your blood tested after age 21. Your doctor will tell you how often to have this done based on your age, family history, or other things that can increase your risk for heart disease. · Blood pressure. Have your blood pressure checked during a routine doctor visit. Your doctor will tell you how often to check your blood pressure based on your age, your blood pressure results, and other factors. · Vision. Talk with your doctor about how often to have a glaucoma test. 
· Diabetes. Ask your doctor whether you should have tests for diabetes. · Colon cancer. Have a test for colon cancer at age 48. You may have one of several tests. If you are younger than 48, you may need a test earlier if you have any risk factors. Risk factors include whether you already had a precancerous polyp removed from your colon or whether your parent, brother, sister, or child has had colon cancer. For women · Breast exam and mammogram. Talk to your doctor about when you should have a clinical breast exam and a mammogram. Medical experts differ on whether and how often women under 50 should have these tests. Your doctor can help you decide what is right for you. · Pap test and pelvic exam. Begin Pap tests at age 24. A Pap test is the best way to find cervical cancer. The test often is part of a pelvic exam. Ask how often to have this test. 
· Tests for sexually transmitted infections (STIs). Ask whether you should have tests for STIs. You may be at risk if you have sex with more than one person, especially if your partners do not wear condoms. For men · Tests for sexually transmitted infections (STIs). Ask whether you should have tests for STIs. You may be at risk if you have sex with more than one person, especially if you do not wear a condom. · Testicular cancer exam. Ask your doctor whether you should check your testicles regularly. · Prostate exam. Talk to your doctor about whether you should have a blood test (called a PSA test) for prostate cancer. Experts differ on whether and when men should have this test. Some experts suggest it if you are older than 39 and are -American or have a father or brother who got prostate cancer when he was younger than 72. When should you call for help? Watch closely for changes in your health, and be sure to contact your doctor if you have any problems or symptoms that concern you. Where can you learn more? Go to http://samuel-kelsey.info/. Enter P072 in the search box to learn more about \"Well Visit, Ages 25 to 48: Care Instructions. \" Current as of: March 29, 2018 Content Version: 11.8 © 6179-3524 "[employer]". Care instructions adapted under license by [employer] (which disclaims liability or warranty for this information). If you have questions about a medical condition or this instruction, always ask your healthcare professional. [address] any warranty or liability for your use of this information. Learning About Hysterectomy Surgery What is a hysterectomy? A hysterectomy is surgery to take out the uterus. Sometimes the ovaries and fallopian tubes also are removed at the same time. How is this surgery done? There are many ways to do the surgery. The type you have depends on your medical condition. It also depends on your overall health. Talk with your doctor about which type is right for you. Abdominal surgery This is done through a cut, called an incision, that the doctor makes in the lower belly. Your doctor may do surgery this way if: 
· You have a lot of scar tissue around your uterus. · Your uterus is very large. · You have cancer of the uterus or cervix. · Your doctor needs to check the area around your uterus for problems. Vaginal surgery This is done through the vagina. Your doctor may do surgery this way if: 
· Your uterus is normal in size. The smaller size means it can be removed easily. · You do not have endometriosis. (It is a problem that causes tissue in the uterus to grow in other parts of the body.) · You do not have cancer of the uterus or cervix. Laparoscopically assisted vaginal surgery This is done through the vagina and one or more small cuts in the belly. The doctor will insert a laparoscope and special tools through the cuts in the belly. The scope and tools help free the uterus. Then it is removed through the vagina. Laparoscopic supracervical surgery This is done through several small cuts in the belly. The doctor inserts a scope and special tools through the cuts in the belly. He or she takes out the uterus in small pieces through the cuts. The cervix is left in place. This is an option when cancer of the cervix is not a concern. In rare cases, during a vaginal or laparoscopic surgery, the doctor may find a problem that makes abdominal surgery a better choice. If this happens, the doctor will take out the uterus through a cut in the lower belly. What can you expect after your surgery? Recovery can take from 4 to 6 weeks. It depends on which type of surgery you have. You will need help around the house while you get better. You won't be able to do any heavy lifting. And you will have to take it easy for a few weeks. It is normal to feel more tired than usual. You also may notice that your emotions go up and down more than usual for a while. After surgery, you will no longer have periods.  You will not be able to get pregnant. If there is a chance that you will want to have a baby in the future, talk to your doctor about other treatment options. The surgery should not lower your interest in sex after you have healed. In fact, some women enjoy sex more. They no longer have to worry about birth control or heavy bleeding. Some women have vaginal dryness after the surgery. It can make sex less comfortable. A vaginal lubricant, such as Astroglide or K-Y Jelly, can help. You may need to take hormone pills after your surgery if your ovaries are removed and you have not gone through menopause. Taking out the ovaries before menopause causes a sudden drop in the hormone estrogen. This increases your risk of getting weak and brittle bones (osteoporosis). Although estrogen therapy lowers this risk, it does slightly raise the risk of some other problems. These include breast cancer and stroke. Talk with your doctor about the pros and cons of taking estrogen if you have your ovaries removed. Follow-up care is a key part of your treatment and safety. Be sure to make and go to all appointments, and call your doctor if you are having problems. It is also a good idea to know your test results and keep a list of the medicines you take. Where can you learn more? Go to http://samuel-kelsey.info/. Enter 636 3657 in the search box to learn more about \"Learning About Hysterectomy Surgery. \" Current as of: October 6, 2017 Content Version: 11.8 © 9791-0038 [employer]. Care instructions adapted under license by [employer] (which disclaims liability or warranty for this information). If you have questions about a medical condition or this instruction, always ask your healthcare professional. [address] any warranty or liability for your use of this information. Uterine Fibroids: Care Instructions Your Care Instructions Uterine fibroids are growths in the uterus. Fibroids aren't cancer. Doctors don't know what causes fibroids. Fibroids are very common in women during their childbearing years. Fibroids can grow on the inside of the uterus, in the muscle wall of the uterus, or near the outside wall of the uterus. In some women, fibroids cause painful cramps and heavy periods. In these cases, taking anti-inflammatory medicines, birth control pills, or using an intrauterine device (IUD) often helps decrease symptoms. Sometimes surgery is needed to treat fibroids. But if you are near menopause, you may want to wait and see if your symptoms get better. Most fibroids shrink and go away after menopause, when your menstrual periods stop completely. Follow-up care is a key part of your treatment and safety. Be sure to make and go to all appointments, and call your doctor if you are having problems. It's also a good idea to know your test results and keep a list of the medicines you take. How can you care for yourself at home? · If your doctor gave you medicine, take it as exactly as prescribed. Be safe with medicines. Call your doctor if you think you are having a problem with your medicine. · Take anti-inflammatory medicines for pain. These include ibuprofen (Advil, Motrin) and naproxen (Aleve). Read and follow all instructions on the label. · Use heat, such as a hot water bottle or a heating pad set on low, or a warm bath to relax tense muscles and relieve cramping. Put a thin cloth between the heating pad and your skin. Never go to sleep with a heating pad on. · Lie down and put a pillow under your knees. Or, lie on your side and bring your knees up to your chest. These positions may help relieve belly pain or pressure. · Keep track of how many sanitary pads or tampons you use each day. · Get at least 30 minutes of exercise on most days of the week.  Walking is a good choice. You also may want to do other activities, such as running, swimming, cycling, or playing tennis or team sports. · If you bleed longer than usual or have heavy bleeding, take a daily multivitamin with iron. When should you call for help? Call your doctor now or seek immediate medical care if: 
  · You have severe vaginal bleeding.  
  · You have new or worse belly or pelvic pain.  
 Watch closely for changes in your health, and be sure to contact your doctor if: 
  · You have unusual vaginal bleeding.  
  · You do not get better as expected. Where can you learn more? Go to http://samuel-kelsey.info/. Enter B121 in the search box to learn more about \"Uterine Fibroids: Care Instructions. \" Current as of: May 15, 2018 Content Version: 11.8 © 7087-1904 [employer]. Care instructions adapted under license by [employer] (which disclaims liability or warranty for this information). If you have questions about a medical condition or this instruction, always ask your healthcare professional. [address] any warranty or liability for your use of this information.

## 2018-10-16 NOTE — PROGRESS NOTES
Subjective:  
49 y.o. female for Well Woman Check. Patient's last menstrual period was 09/30/2018. Social History: single partner, contraception - BTL Pertinent past medical history: hypertension, DM no history of  DVT, CAD, liver disease, migraines or smoking. ROS:  Feeling well. No dyspnea or chest pain on exertion. No abdominal pain, change in bowel habits, black or bloody stools. No urinary tract symptoms. GYN ROS: no breast pain or new or enlarging lumps on self exam, no hot flashes. No neurological complaints. Objective:  
 
Visit Vitals  LMP 09/30/2018 The patient appears well, alert, oriented x 3, in no distress. ENT normal.  Neck supple. No adenopathy or thyromegaly. MAVIS. Lungs are clear, good air entry, no wheezes, rhonchi or rales. S1 and S2 normal, no murmurs, regular rate and rhythm. Abdomen soft without tenderness, guarding, mass or organomegaly. Extremities show no edema, normal peripheral pulses. Neurological is normal, no focal findings. BREAST EXAM: breasts appear normal, no suspicious masses, no skin or nipple changes or axillary nodes PELVIC EXAM: normal external genitalia, vulva, vagina, cervix, large >12 weeks size uterus and adnexa Assessment/Plan:  
well woman Fibroids. mammogram 
pap smear 
return annually or prn Chrsita Nino

## 2018-10-16 NOTE — MR AVS SNAPSHOT
[address] [place] [address] [facility] [address] [facility] [address] 
[phone] Patient: Hussein Penny MRN: [id number] Visit Information Date & Time Provider Department Dept. Phone Encounter #  
 10/16/2018 10:00 AM Manuel Chandra [id number] Follow-up Instructions Return in 1 week (on 10/23/2018), or if symptoms worsen or fail to improve. Your Appointments 11/14/2018 10:15 AM  
Follow Up with Case Davila MD  
[facility] (--) Appt Note: follow up [address] [facility] [id number] [phone]  
  
   
 [address] [facility] [address] Upcoming Health Maintenance Date Due Influenza Age 5 to Adult 8/1/2018 PAP AKA CERVICAL CYTOLOGY 9/28/2018 Allergies as of 10/16/2018  Review Complete On: 10/16/2018 By: Ryan Davis MD  
  
 Severity Noted Reaction Type Reactions Codeine High 08/01/2012    Nausea Only, Other (comments) Sweats felt like she was going to pass out Procardia [Nifedipine] High 06/07/2011    Shortness of Breath, Nausea and Vomiting, Other (comments) Tightness in chest on 7/6/1990 Sulfa (Sulfonamide Antibiotics) High 06/07/2011    Rash Tylox [Oxycodone-acetaminophen] High 08/01/2012    Nausea Only, Other (comments) Sweats, felt like she was going to pass out Current Immunizations  Never Reviewed Name Date Influenza Vaccine 10/16/2015 Influenza Vaccine Split 10/14/2012 Not reviewed this visit You Were Diagnosed With   
  
 Codes Comments Well woman exam with routine gynecological exam    -  Primary ICD-10-CM: S79.379 ICD-9-CM: V72.31 [V72.31] Vitals LMP OB Status Smoking Status 09/30/2018 Having regular periods Never Smoker Preferred Pharmacy Pharmacy Name Phone [address] [place], [address] [place] [address] [phone] Your Updated Medication List  
  
   
This list is accurate as of 10/16/18 11:09 AM.  Always use your most recent med list.  
  
  
  
  
 Blood-Glucose Meter monitoring kit Test blood sugar once a day .00 One Touch Ultra Mini Glucose Meter  
  
 cyanocobalamin 1,000 mcg tablet Take 1,000 mcg by mouth daily. fluticasone 50 mcg/actuation nasal spray Commonly known as:  FLONASE  
USE 2 SPRAYS IN EACH NOSTRIL DAILY  
  
 glucose blood VI test strips strip Commonly known as:  ASCENSIA AUTODISC VI, ONE TOUCH ULTRA TEST VI Test blood sugar once daily .00 One Touch Ultra Mini Test strip  
  
 hydroCHLOROthiazide 12.5 mg tablet Commonly known as:  HYDRODIURIL Take 1 Tab by mouth daily. JANUMET  mg per tablet Generic drug:  SITagliptin-metFORMIN  
TAKE ONE TABLET BY MOUTH TWICE DAILY WITH MEALS Lancets Misc Use with One Touch Ultra Mini Glucose Meter test blood sugar once daily .00  
  
 lisinopril 40 mg tablet Commonly known as:  Cathyann Isela Take 1 Tab by mouth daily. meclizine 12.5 mg tablet Commonly known as:  ANTIVERT Take 1 Tab by mouth three (3) times daily as needed. MULTI-VITAMIN PO Take  by mouth daily. naproxen 500 mg tablet Commonly known as:  NAPROSYN  
TAKE ONE TABLET BY MOUTH TWICE DAILY WITH MEALS  
  
 ondansetron 8 mg disintegrating tablet Commonly known as:  ZOFRAN ODT Take 1 Tab by mouth every eight (8) hours as needed for Nausea. polyethylene glycol 17 gram/dose powder Commonly known as:  Toy Hair Take 17 g by mouth daily. 1 tablespoon with 8 oz of water daily SLOW RELEASE IRON 140 mg (45 mg iron) Tber ER tablet Generic drug:  ferrous sulfate Take  by mouth every other day. triamcinolone acetonide 0.1 % topical cream  
Commonly known as:  KENALOG  
APPLY A THIN LAYER TO AFFECTED AREA TWICE DAILY VENTOLIN HFA 90 mcg/actuation inhaler Generic drug:  albuterol INHALE TWO PUFFS BY MOUTH EVERY 4 HOURS AS NEEDED FOR WHEEZING  
  
 VITAMIN C 500 mg tablet Generic drug:  ascorbic acid (vitamin C) Take 500 mg by mouth daily. Follow-up Instructions Return in 1 week (on 10/23/2018), or if symptoms worsen or fail to improve. Patient Instructions Well Visit, Ages 25 to 48: Care Instructions Your Care Instructions Physical exams can help you stay healthy. Your doctor has checked your overall health and may have suggested ways to take good care of yourself. He or she also may have recommended tests. At home, you can help prevent illness with healthy eating, regular exercise, and other steps. Follow-up care is a key part of your treatment and safety. Be sure to make and go to all appointments, and call your doctor if you are having problems. It's also a good idea to know your test results and keep a list of the medicines you take. How can you care for yourself at home? · Reach and stay at a healthy weight. This will lower your risk for many problems, such as obesity, diabetes, heart disease, and high blood pressure. · Get at least 30 minutes of physical activity on most days of the week. Walking is a good choice. You also may want to do other activities, such as running, swimming, cycling, or playing tennis or team sports. Discuss any changes in your exercise program with your doctor. · Do not smoke or allow others to smoke around you. If you need help quitting, talk to your doctor about stop-smoking programs and medicines. These can increase your chances of quitting for good. · Talk to your doctor about whether you have any risk factors for sexually transmitted infections (STIs). Having one sex partner (who does not have STIs and does not have sex with anyone else) is a good way to avoid these infections. · Use birth control if you do not want to have children at this time. Talk with your doctor about the choices available and what might be best for you. · Protect your skin from too much sun. When you're outdoors from 10 a.m. to 4 p.m., stay in the shade or cover up with clothing and a hat with a wide brim. Wear sunglasses that block UV rays. Even when it's cloudy, put broad-spectrum sunscreen (SPF 30 or higher) on any exposed skin. · See a dentist one or two times a year for checkups and to have your teeth cleaned. · Wear a seat belt in the car. · Drink alcohol in moderation, if at all. That means no more than 2 drinks a day for men and 1 drink a day for women. Follow your doctor's advice about when to have certain tests. These tests can spot problems early. For everyone · Cholesterol. Have the fat (cholesterol) in your blood tested after age 21. Your doctor will tell you how often to have this done based on your age, family history, or other things that can increase your risk for heart disease. · Blood pressure. Have your blood pressure checked during a routine doctor visit. Your doctor will tell you how often to check your blood pressure based on your age, your blood pressure results, and other factors. · Vision. Talk with your doctor about how often to have a glaucoma test. 
· Diabetes. Ask your doctor whether you should have tests for diabetes. · Colon cancer. Have a test for colon cancer at age 48. You may have one of several tests. If you are younger than 48, you may need a test earlier if you have any risk factors. Risk factors include whether you already had a precancerous polyp removed from your colon or whether your parent, brother, sister, or child has had colon cancer. For women · Breast exam and mammogram. Talk to your doctor about when you should have a clinical breast exam and a mammogram. Medical experts differ on whether and how often women under 50 should have these tests.  Your doctor can help you decide what is right for you. · Pap test and pelvic exam. Begin Pap tests at age 24. A Pap test is the best way to find cervical cancer. The test often is part of a pelvic exam. Ask how often to have this test. 
· Tests for sexually transmitted infections (STIs). Ask whether you should have tests for STIs. You may be at risk if you have sex with more than one person, especially if your partners do not wear condoms. For men · Tests for sexually transmitted infections (STIs). Ask whether you should have tests for STIs. You may be at risk if you have sex with more than one person, especially if you do not wear a condom. · Testicular cancer exam. Ask your doctor whether you should check your testicles regularly. · Prostate exam. Talk to your doctor about whether you should have a blood test (called a PSA test) for prostate cancer. Experts differ on whether and when men should have this test. Some experts suggest it if you are older than 39 and are -American or have a father or brother who got prostate cancer when he was younger than 72. When should you call for help? Watch closely for changes in your health, and be sure to contact your doctor if you have any problems or symptoms that concern you. Where can you learn more? Go to http://samuel-kelsey.info/. Enter P072 in the search box to learn more about \"Well Visit, Ages 25 to 48: Care Instructions. \" Current as of: March 29, 2018 Content Version: 11.8 © 5944-6046 Healthwise, Incorporated. Care instructions adapted under license by [employer] (which disclaims liability or warranty for this information). If you have questions about a medical condition or this instruction, always ask your healthcare professional. [place] [address] any warranty or liability for your use of this information. Learning About Hysterectomy Surgery What is a hysterectomy? A hysterectomy is surgery to take out the uterus. Sometimes the ovaries and fallopian tubes also are removed at the same time. How is this surgery done? There are many ways to do the surgery. The type you have depends on your medical condition. It also depends on your overall health. Talk with your doctor about which type is right for you. Abdominal surgery This is done through a cut, called an incision, that the doctor makes in the lower belly. Your doctor may do surgery this way if: 
· You have a lot of scar tissue around your uterus. · Your uterus is very large. · You have cancer of the uterus or cervix. · Your doctor needs to check the area around your uterus for problems. Vaginal surgery This is done through the vagina. Your doctor may do surgery this way if: 
· Your uterus is normal in size. The smaller size means it can be removed easily. · You do not have endometriosis. (It is a problem that causes tissue in the uterus to grow in other parts of the body.) · You do not have cancer of the uterus or cervix. Laparoscopically assisted vaginal surgery This is done through the vagina and one or more small cuts in the belly. The doctor will insert a laparoscope and special tools through the cuts in the belly. The scope and tools help free the uterus. Then it is removed through the vagina. Laparoscopic supracervical surgery This is done through several small cuts in the belly. The doctor inserts a scope and special tools through the cuts in the belly. He or she takes out the uterus in small pieces through the cuts. The cervix is left in place. This is an option when cancer of the cervix is not a concern. In rare cases, during a vaginal or laparoscopic surgery, the doctor may find a problem that makes abdominal surgery a better choice. If this happens, the doctor will take out the uterus through a cut in the lower belly. What can you expect after your surgery? Recovery can take from 4 to 6 weeks. It depends on which type of surgery you have. You will need help around the house while you get better. You won't be able to do any heavy lifting. And you will have to take it easy for a few weeks. It is normal to feel more tired than usual. You also may notice that your emotions go up and down more than usual for a while. After surgery, you will no longer have periods. You will not be able to get pregnant. If there is a chance that you will want to have a baby in the future, talk to your doctor about other treatment options. The surgery should not lower your interest in sex after you have healed. In fact, some women enjoy sex more. They no longer have to worry about birth control or heavy bleeding. Some women have vaginal dryness after the surgery. It can make sex less comfortable. A vaginal lubricant, such as Astroglide or K-Y Jelly, can help. You may need to take hormone pills after your surgery if your ovaries are removed and you have not gone through menopause. Taking out the ovaries before menopause causes a sudden drop in the hormone estrogen. This increases your risk of getting weak and brittle bones (osteoporosis). Although estrogen therapy lowers this risk, it does slightly raise the risk of some other problems. These include breast cancer and stroke. Talk with your doctor about the pros and cons of taking estrogen if you have your ovaries removed. Follow-up care is a key part of your treatment and safety. Be sure to make and go to all appointments, and call your doctor if you are having problems. It is also a good idea to know your test results and keep a list of the medicines you take. Where can you learn more? Go to http://samuel-kelsey.info/. Enter 611 [id number] in the search box to learn more about \"Learning About Hysterectomy Surgery. \" Current as of: October 6, 2017 Content Version: 11.8 © 3894-7554 [employer]. Care instructions adapted under license by [employer] (which disclaims liability or warranty for this information). If you have questions about a medical condition or this instruction, always ask your healthcare professional. [address] any warranty or liability for your use of this information. Uterine Fibroids: Care Instructions Your Care Instructions Uterine fibroids are growths in the uterus. Fibroids aren't cancer. Doctors don't know what causes fibroids. Fibroids are very common in women during their childbearing years. Fibroids can grow on the inside of the uterus, in the muscle wall of the uterus, or near the outside wall of the uterus. In some women, fibroids cause painful cramps and heavy periods. In these cases, taking anti-inflammatory medicines, birth control pills, or using an intrauterine device (IUD) often helps decrease symptoms. Sometimes surgery is needed to treat fibroids. But if you are near menopause, you may want to wait and see if your symptoms get better. Most fibroids shrink and go away after menopause, when your menstrual periods stop completely. Follow-up care is a key part of your treatment and safety. Be sure to make and go to all appointments, and call your doctor if you are having problems. It's also a good idea to know your test results and keep a list of the medicines you take. How can you care for yourself at home? · If your doctor gave you medicine, take it as exactly as prescribed. Be safe with medicines. Call your doctor if you think you are having a problem with your medicine. · Take anti-inflammatory medicines for pain. These include ibuprofen (Advil, Motrin) and naproxen (Aleve). Read and follow all instructions on the label. · Use heat, such as a hot water bottle or a heating pad set on low, or a warm bath to relax tense muscles and relieve cramping.  Put a thin cloth between the heating pad and your skin. Never go to sleep with a heating pad on. · Lie down and put a pillow under your knees. Or, lie on your side and bring your knees up to your chest. These positions may help relieve belly pain or pressure. · Keep track of how many sanitary pads or tampons you use each day. · Get at least 30 minutes of exercise on most days of the week. Walking is a good choice. You also may want to do other activities, such as running, swimming, cycling, or playing tennis or team sports. · If you bleed longer than usual or have heavy bleeding, take a daily multivitamin with iron. When should you call for help? Call your doctor now or seek immediate medical care if: 
  · You have severe vaginal bleeding.  
  · You have new or worse belly or pelvic pain.  
 Watch closely for changes in your health, and be sure to contact your doctor if: 
  · You have unusual vaginal bleeding.  
  · You do not get better as expected. Where can you learn more? Go to http://samuel-kelsey.info/. Enter B121 in the search box to learn more about \"Uterine Fibroids: Care Instructions. \" Current as of: May 15, 2018 Content Version: 11.8 © 2537-1822 [employer]. Care instructions adapted under license by [employer] (which disclaims liability or warranty for this information). If you have questions about a medical condition or this instruction, always ask your healthcare professional. [place] [address] any warranty or liability for your use of this information. Introducing [facility]! Dear Anjana Nicole: 
Thank you for requesting a [place] account. Our records indicate that you already have an active [place] account. You can access your account anytime at [email or web address] Did you know that you can access your hospital and ER discharge instructions at any time in [place]?   You can also review all of your test results from your hospital stay or ER visit. Additional Information If you have questions, please visit the Frequently Asked Questions section of the [employer] website at [email or web address] Remember, [facility] is NOT to be used for urgent needs. For medical emergencies, dial 911. Now available from your iPhone and Android! Please provide this summary of care documentation to your next provider. Your primary care clinician is listed as Chapito Reynolds. If you have any questions after today's visit, please call [phone].

## 2018-10-17 LAB
HPV HIGH RISK, 507303: NEGATIVE
PAP IMAGE GUIDED, 8900296: NORMAL

## 2018-10-24 ENCOUNTER — HOSPITAL ENCOUNTER (OUTPATIENT)
Dept: PREADMISSION TESTING | Age: 48
Discharge: HOME | End: 2018-10-24
Payer: COMMERCIAL

## 2018-10-24 ENCOUNTER — HOSPITAL ENCOUNTER (OUTPATIENT)
Dept: LAB | Age: 48
Discharge: HOME | End: 2018-10-24

## 2018-10-24 DIAGNOSIS — Z01.818 PRE-OP EVALUATION: ICD-10-CM

## 2018-10-24 LAB
ABO + RH BLD: NORMAL
ATRIAL RATE: 63 BPM
BLOOD GROUP ANTIBODIES SERPL: NORMAL
CALCULATED P AXIS, ECG09: 55 DEGREES
CALCULATED R AXIS, ECG10: 1 DEGREES
CALCULATED T AXIS, ECG11: 39 DEGREES
DIAGNOSIS, 93000: NORMAL
P-R INTERVAL, ECG05: 174 MS
Q-T INTERVAL, ECG07: 392 MS
QRS DURATION, ECG06: 90 MS
QTC CALCULATION (BEZET), ECG08: 401 MS
SENTARA SPECIMEN COL,SENBCF: NORMAL
SPECIMEN EXP DATE BLD: NORMAL
VENTRICULAR RATE, ECG03: 63 BPM

## 2018-10-24 PROCEDURE — 99001 SPECIMEN HANDLING PT-LAB: CPT | Performed by: OBSTETRICS & GYNECOLOGY

## 2018-10-24 PROCEDURE — 86900 BLOOD TYPING SEROLOGIC ABO: CPT | Performed by: OBSTETRICS & GYNECOLOGY

## 2018-10-24 PROCEDURE — 93005 ELECTROCARDIOGRAM TRACING: CPT

## 2018-10-29 ENCOUNTER — ANESTHESIA EVENT (OUTPATIENT)
Dept: SURGERY | Age: 48
DRG: 743 | End: 2018-10-29
Payer: COMMERCIAL

## 2018-10-30 ENCOUNTER — ANESTHESIA (OUTPATIENT)
Dept: SURGERY | Age: 48
DRG: 743 | End: 2018-10-30
Payer: COMMERCIAL

## 2018-10-30 ENCOUNTER — HOSPITAL ENCOUNTER (INPATIENT)
Age: 48
LOS: 2 days | Discharge: HOME | DRG: 743 | End: 2018-11-01
Attending: OBSTETRICS & GYNECOLOGY | Admitting: OBSTETRICS & GYNECOLOGY
Payer: COMMERCIAL

## 2018-10-30 DIAGNOSIS — D50.0 IRON DEFICIENCY ANEMIA DUE TO CHRONIC BLOOD LOSS: ICD-10-CM

## 2018-10-30 DIAGNOSIS — G89.18 POSTOPERATIVE PAIN: Primary | ICD-10-CM

## 2018-10-30 DIAGNOSIS — K59.03 DRUG-INDUCED CONSTIPATION: ICD-10-CM

## 2018-10-30 DIAGNOSIS — D64.9 ANEMIA, UNSPECIFIED TYPE: ICD-10-CM

## 2018-10-30 DIAGNOSIS — D21.9 FIBROIDS: ICD-10-CM

## 2018-10-30 PROBLEM — N93.8 DUB (DYSFUNCTIONAL UTERINE BLEEDING): Status: ACTIVE | Noted: 2018-10-30

## 2018-10-30 LAB
BASOPHILS # BLD: 0 K/UL (ref 0–0.1)
BASOPHILS NFR BLD: 0 % (ref 0–2)
DIFFERENTIAL METHOD BLD: ABNORMAL
EOSINOPHIL # BLD: 0.1 K/UL (ref 0–0.4)
EOSINOPHIL NFR BLD: 1 % (ref 0–5)
ERYTHROCYTE [DISTWIDTH] IN BLOOD BY AUTOMATED COUNT: 15 % (ref 11.6–14.5)
GLUCOSE BLD STRIP.AUTO-MCNC: 200 MG/DL (ref 70–110)
GLUCOSE BLD STRIP.AUTO-MCNC: 210 MG/DL (ref 70–110)
GLUCOSE BLD STRIP.AUTO-MCNC: 259 MG/DL (ref 70–110)
HCT VFR BLD AUTO: 30.3 % (ref 35–45)
HGB BLD-MCNC: 9.4 G/DL (ref 12–16)
LYMPHOCYTES # BLD: 1.7 K/UL (ref 0.9–3.6)
LYMPHOCYTES NFR BLD: 35 % (ref 21–52)
MCH RBC QN AUTO: 23.8 PG (ref 24–34)
MCHC RBC AUTO-ENTMCNC: 31 G/DL (ref 31–37)
MCV RBC AUTO: 76.7 FL (ref 74–97)
MONOCYTES # BLD: 0.3 K/UL (ref 0.05–1.2)
MONOCYTES NFR BLD: 5 % (ref 3–10)
NEUTS SEG # BLD: 2.7 K/UL (ref 1.8–8)
NEUTS SEG NFR BLD: 59 % (ref 40–73)
PLATELET # BLD AUTO: 280 K/UL (ref 135–420)
PMV BLD AUTO: 10.1 FL (ref 9.2–11.8)
RBC # BLD AUTO: 3.95 M/UL (ref 4.2–5.3)
WBC # BLD AUTO: 4.7 K/UL (ref 4.6–13.2)

## 2018-10-30 PROCEDURE — 0UT70ZZ RESECTION OF BILATERAL FALLOPIAN TUBES, OPEN APPROACH: ICD-10-PCS | Performed by: OBSTETRICS & GYNECOLOGY

## 2018-10-30 PROCEDURE — 77030003028 HC SUT VCRL J&J -A: Performed by: OBSTETRICS & GYNECOLOGY

## 2018-10-30 PROCEDURE — 74011250637 HC RX REV CODE- 250/637: Performed by: NURSE ANESTHETIST, CERTIFIED REGISTERED

## 2018-10-30 PROCEDURE — 77030019605: Performed by: OBSTETRICS & GYNECOLOGY

## 2018-10-30 PROCEDURE — 74011636637 HC RX REV CODE- 636/637: Performed by: NURSE ANESTHETIST, CERTIFIED REGISTERED

## 2018-10-30 PROCEDURE — 77010033678 HC OXYGEN DAILY

## 2018-10-30 PROCEDURE — 76010000134 HC OR TIME 3.5 TO 4 HR: Performed by: OBSTETRICS & GYNECOLOGY

## 2018-10-30 PROCEDURE — 88307 TISSUE EXAM BY PATHOLOGIST: CPT

## 2018-10-30 PROCEDURE — 74011250636 HC RX REV CODE- 250/636: Performed by: NURSE ANESTHETIST, CERTIFIED REGISTERED

## 2018-10-30 PROCEDURE — 74011000250 HC RX REV CODE- 250

## 2018-10-30 PROCEDURE — 0UT90ZL RESECTION OF UTERUS, SUPRACERVICAL, OPEN APPROACH: ICD-10-PCS | Performed by: OBSTETRICS & GYNECOLOGY

## 2018-10-30 PROCEDURE — 76210000017 HC OR PH I REC 1.5 TO 2 HR: Performed by: OBSTETRICS & GYNECOLOGY

## 2018-10-30 PROCEDURE — 74011250636 HC RX REV CODE- 250/636: Performed by: OBSTETRICS & GYNECOLOGY

## 2018-10-30 PROCEDURE — 77030008467 HC STPLR SKN COVD -B: Performed by: OBSTETRICS & GYNECOLOGY

## 2018-10-30 PROCEDURE — 77030018836 HC SOL IRR NACL ICUM -A: Performed by: OBSTETRICS & GYNECOLOGY

## 2018-10-30 PROCEDURE — 77030031139 HC SUT VCRL2 J&J -A: Performed by: OBSTETRICS & GYNECOLOGY

## 2018-10-30 PROCEDURE — 85025 COMPLETE CBC W/AUTO DIFF WBC: CPT | Performed by: OBSTETRICS & GYNECOLOGY

## 2018-10-30 PROCEDURE — 74011250636 HC RX REV CODE- 250/636

## 2018-10-30 PROCEDURE — 0UT20ZZ RESECTION OF BILATERAL OVARIES, OPEN APPROACH: ICD-10-PCS | Performed by: OBSTETRICS & GYNECOLOGY

## 2018-10-30 PROCEDURE — 82962 GLUCOSE BLOOD TEST: CPT

## 2018-10-30 PROCEDURE — 88309 TISSUE EXAM BY PATHOLOGIST: CPT | Performed by: OBSTETRICS & GYNECOLOGY

## 2018-10-30 PROCEDURE — 76060000038 HC ANESTHESIA 3.5 TO 4 HR: Performed by: OBSTETRICS & GYNECOLOGY

## 2018-10-30 PROCEDURE — 77030032490 HC SLV COMPR SCD KNE COVD -B: Performed by: OBSTETRICS & GYNECOLOGY

## 2018-10-30 PROCEDURE — 77030002974 HC SUT PLN J&J -A: Performed by: OBSTETRICS & GYNECOLOGY

## 2018-10-30 PROCEDURE — 77030020782 HC GWN BAIR PAWS FLX 3M -B: Performed by: OBSTETRICS & GYNECOLOGY

## 2018-10-30 PROCEDURE — 77030002933 HC SUT MCRYL J&J -A: Performed by: OBSTETRICS & GYNECOLOGY

## 2018-10-30 PROCEDURE — 77030034850: Performed by: OBSTETRICS & GYNECOLOGY

## 2018-10-30 PROCEDURE — 65270000029 HC RM PRIVATE

## 2018-10-30 RX ORDER — SODIUM CHLORIDE 0.9 % (FLUSH) 0.9 %
5-10 SYRINGE (ML) INJECTION EVERY 8 HOURS
Status: DISCONTINUED | OUTPATIENT
Start: 2018-10-30 | End: 2018-10-30 | Stop reason: HOSPADM

## 2018-10-30 RX ORDER — SODIUM CHLORIDE 0.9 % (FLUSH) 0.9 %
5-10 SYRINGE (ML) INJECTION EVERY 8 HOURS
Status: DISCONTINUED | OUTPATIENT
Start: 2018-10-30 | End: 2018-11-01 | Stop reason: HOSPADM

## 2018-10-30 RX ORDER — ONDANSETRON 2 MG/ML
4 INJECTION INTRAMUSCULAR; INTRAVENOUS
Status: DISCONTINUED | OUTPATIENT
Start: 2018-10-30 | End: 2018-11-01 | Stop reason: HOSPADM

## 2018-10-30 RX ORDER — CEFAZOLIN SODIUM 2 G/50ML
2 SOLUTION INTRAVENOUS ONCE
Status: COMPLETED | OUTPATIENT
Start: 2018-10-30 | End: 2018-10-30

## 2018-10-30 RX ORDER — PROPOFOL 10 MG/ML
INJECTION, EMULSION INTRAVENOUS AS NEEDED
Status: DISCONTINUED | OUTPATIENT
Start: 2018-10-30 | End: 2018-10-30 | Stop reason: HOSPADM

## 2018-10-30 RX ORDER — MAGNESIUM SULFATE 100 %
4 CRYSTALS MISCELLANEOUS AS NEEDED
Status: DISCONTINUED | OUTPATIENT
Start: 2018-10-30 | End: 2018-10-30 | Stop reason: HOSPADM

## 2018-10-30 RX ORDER — FENTANYL CITRATE 50 UG/ML
INJECTION, SOLUTION INTRAMUSCULAR; INTRAVENOUS AS NEEDED
Status: DISCONTINUED | OUTPATIENT
Start: 2018-10-30 | End: 2018-10-30 | Stop reason: HOSPADM

## 2018-10-30 RX ORDER — ONDANSETRON 2 MG/ML
INJECTION INTRAMUSCULAR; INTRAVENOUS AS NEEDED
Status: DISCONTINUED | OUTPATIENT
Start: 2018-10-30 | End: 2018-10-30 | Stop reason: HOSPADM

## 2018-10-30 RX ORDER — LIDOCAINE HYDROCHLORIDE 10 MG/ML
0.1 INJECTION, SOLUTION EPIDURAL; INFILTRATION; INTRACAUDAL; PERINEURAL AS NEEDED
Status: DISCONTINUED | OUTPATIENT
Start: 2018-10-30 | End: 2018-10-30 | Stop reason: HOSPADM

## 2018-10-30 RX ORDER — DEXTROSE 50 % IN WATER (D50W) INTRAVENOUS SYRINGE
25-50 AS NEEDED
Status: DISCONTINUED | OUTPATIENT
Start: 2018-10-30 | End: 2018-10-30 | Stop reason: HOSPADM

## 2018-10-30 RX ORDER — INSULIN LISPRO 100 [IU]/ML
INJECTION, SOLUTION INTRAVENOUS; SUBCUTANEOUS ONCE
Status: COMPLETED | OUTPATIENT
Start: 2018-10-30 | End: 2018-10-30

## 2018-10-30 RX ORDER — ONDANSETRON 2 MG/ML
4 INJECTION INTRAMUSCULAR; INTRAVENOUS ONCE
Status: COMPLETED | OUTPATIENT
Start: 2018-10-30 | End: 2018-10-30

## 2018-10-30 RX ORDER — GLYCOPYRROLATE 0.2 MG/ML
INJECTION INTRAMUSCULAR; INTRAVENOUS AS NEEDED
Status: DISCONTINUED | OUTPATIENT
Start: 2018-10-30 | End: 2018-10-30 | Stop reason: HOSPADM

## 2018-10-30 RX ORDER — HYDROMORPHONE HYDROCHLORIDE 2 MG/ML
0.5 INJECTION, SOLUTION INTRAMUSCULAR; INTRAVENOUS; SUBCUTANEOUS
Status: DISCONTINUED | OUTPATIENT
Start: 2018-10-30 | End: 2018-10-30 | Stop reason: HOSPADM

## 2018-10-30 RX ORDER — SCOLOPAMINE TRANSDERMAL SYSTEM 1 MG/1
1 PATCH, EXTENDED RELEASE TRANSDERMAL ONCE
Status: COMPLETED | OUTPATIENT
Start: 2018-10-30 | End: 2018-10-31

## 2018-10-30 RX ORDER — FAMOTIDINE 20 MG/1
20 TABLET, FILM COATED ORAL ONCE
Status: COMPLETED | OUTPATIENT
Start: 2018-10-30 | End: 2018-10-30

## 2018-10-30 RX ORDER — MIDAZOLAM HYDROCHLORIDE 1 MG/ML
INJECTION, SOLUTION INTRAMUSCULAR; INTRAVENOUS AS NEEDED
Status: DISCONTINUED | OUTPATIENT
Start: 2018-10-30 | End: 2018-10-30 | Stop reason: HOSPADM

## 2018-10-30 RX ORDER — DEXTROSE 50 % IN WATER (D50W) INTRAVENOUS SYRINGE
25-50 AS NEEDED
Status: DISCONTINUED | OUTPATIENT
Start: 2018-10-30 | End: 2018-11-01 | Stop reason: HOSPADM

## 2018-10-30 RX ORDER — KETOROLAC TROMETHAMINE 30 MG/ML
INJECTION, SOLUTION INTRAMUSCULAR; INTRAVENOUS AS NEEDED
Status: DISCONTINUED | OUTPATIENT
Start: 2018-10-30 | End: 2018-10-30 | Stop reason: HOSPADM

## 2018-10-30 RX ORDER — NEOSTIGMINE METHYLSULFATE 5 MG/5 ML
SYRINGE (ML) INTRAVENOUS AS NEEDED
Status: DISCONTINUED | OUTPATIENT
Start: 2018-10-30 | End: 2018-10-30 | Stop reason: HOSPADM

## 2018-10-30 RX ORDER — MAGNESIUM SULFATE 100 %
4 CRYSTALS MISCELLANEOUS AS NEEDED
Status: DISCONTINUED | OUTPATIENT
Start: 2018-10-30 | End: 2018-11-01 | Stop reason: HOSPADM

## 2018-10-30 RX ORDER — KETOROLAC TROMETHAMINE 30 MG/ML
15 INJECTION, SOLUTION INTRAMUSCULAR; INTRAVENOUS
Status: DISCONTINUED | OUTPATIENT
Start: 2018-10-30 | End: 2018-10-31

## 2018-10-30 RX ORDER — SODIUM CHLORIDE, SODIUM LACTATE, POTASSIUM CHLORIDE, CALCIUM CHLORIDE 600; 310; 30; 20 MG/100ML; MG/100ML; MG/100ML; MG/100ML
75 INJECTION, SOLUTION INTRAVENOUS CONTINUOUS
Status: DISCONTINUED | OUTPATIENT
Start: 2018-10-30 | End: 2018-10-30 | Stop reason: HOSPADM

## 2018-10-30 RX ORDER — SODIUM CHLORIDE 0.9 % (FLUSH) 0.9 %
5-10 SYRINGE (ML) INJECTION AS NEEDED
Status: DISCONTINUED | OUTPATIENT
Start: 2018-10-30 | End: 2018-11-01 | Stop reason: HOSPADM

## 2018-10-30 RX ORDER — ROCURONIUM BROMIDE 10 MG/ML
INJECTION, SOLUTION INTRAVENOUS AS NEEDED
Status: DISCONTINUED | OUTPATIENT
Start: 2018-10-30 | End: 2018-10-30 | Stop reason: HOSPADM

## 2018-10-30 RX ORDER — SUCCINYLCHOLINE CHLORIDE 20 MG/ML
INJECTION INTRAMUSCULAR; INTRAVENOUS AS NEEDED
Status: DISCONTINUED | OUTPATIENT
Start: 2018-10-30 | End: 2018-10-30 | Stop reason: HOSPADM

## 2018-10-30 RX ORDER — SODIUM CHLORIDE 0.9 % (FLUSH) 0.9 %
5-10 SYRINGE (ML) INJECTION AS NEEDED
Status: DISCONTINUED | OUTPATIENT
Start: 2018-10-30 | End: 2018-10-30 | Stop reason: HOSPADM

## 2018-10-30 RX ORDER — DEXAMETHASONE SODIUM PHOSPHATE 4 MG/ML
INJECTION, SOLUTION INTRA-ARTICULAR; INTRALESIONAL; INTRAMUSCULAR; INTRAVENOUS; SOFT TISSUE AS NEEDED
Status: DISCONTINUED | OUTPATIENT
Start: 2018-10-30 | End: 2018-10-30 | Stop reason: HOSPADM

## 2018-10-30 RX ORDER — LIDOCAINE HYDROCHLORIDE 20 MG/ML
INJECTION, SOLUTION EPIDURAL; INFILTRATION; INTRACAUDAL; PERINEURAL AS NEEDED
Status: DISCONTINUED | OUTPATIENT
Start: 2018-10-30 | End: 2018-10-30 | Stop reason: HOSPADM

## 2018-10-30 RX ADMIN — ROCURONIUM BROMIDE 20 MG: 10 INJECTION, SOLUTION INTRAVENOUS at 08:25

## 2018-10-30 RX ADMIN — ROCURONIUM BROMIDE 5 MG: 10 INJECTION, SOLUTION INTRAVENOUS at 08:13

## 2018-10-30 RX ADMIN — FENTANYL CITRATE 25 MCG: 50 INJECTION, SOLUTION INTRAMUSCULAR; INTRAVENOUS at 11:04

## 2018-10-30 RX ADMIN — CEFAZOLIN SODIUM 2 G: 2 SOLUTION INTRAVENOUS at 08:10

## 2018-10-30 RX ADMIN — SODIUM CHLORIDE, SODIUM LACTATE, POTASSIUM CHLORIDE, AND CALCIUM CHLORIDE 75 ML/HR: 600; 310; 30; 20 INJECTION, SOLUTION INTRAVENOUS at 06:47

## 2018-10-30 RX ADMIN — HUMAN INSULIN 6 UNITS: 100 INJECTION, SOLUTION SUBCUTANEOUS at 11:02

## 2018-10-30 RX ADMIN — HYDROMORPHONE HYDROCHLORIDE 0.5 MG: 2 INJECTION INTRAMUSCULAR; INTRAVENOUS; SUBCUTANEOUS at 12:40

## 2018-10-30 RX ADMIN — FENTANYL CITRATE 25 MCG: 50 INJECTION, SOLUTION INTRAMUSCULAR; INTRAVENOUS at 10:00

## 2018-10-30 RX ADMIN — FENTANYL CITRATE 50 MCG: 50 INJECTION, SOLUTION INTRAMUSCULAR; INTRAVENOUS at 08:49

## 2018-10-30 RX ADMIN — ROCURONIUM BROMIDE 10 MG: 10 INJECTION, SOLUTION INTRAVENOUS at 09:27

## 2018-10-30 RX ADMIN — FENTANYL CITRATE 50 MCG: 50 INJECTION, SOLUTION INTRAMUSCULAR; INTRAVENOUS at 08:42

## 2018-10-30 RX ADMIN — LIDOCAINE HYDROCHLORIDE 100 MG: 20 INJECTION, SOLUTION EPIDURAL; INFILTRATION; INTRACAUDAL; PERINEURAL at 08:13

## 2018-10-30 RX ADMIN — FAMOTIDINE 20 MG: 20 TABLET ORAL at 06:46

## 2018-10-30 RX ADMIN — KETOROLAC TROMETHAMINE 30 MG: 30 INJECTION, SOLUTION INTRAMUSCULAR; INTRAVENOUS at 11:06

## 2018-10-30 RX ADMIN — GLYCOPYRROLATE 0.4 MG: 0.2 INJECTION INTRAMUSCULAR; INTRAVENOUS at 11:20

## 2018-10-30 RX ADMIN — Medication 2 MG: at 11:20

## 2018-10-30 RX ADMIN — ONDANSETRON HYDROCHLORIDE 4 MG: 2 SOLUTION INTRAMUSCULAR; INTRAVENOUS at 12:35

## 2018-10-30 RX ADMIN — SUCCINYLCHOLINE CHLORIDE 200 MG: 20 INJECTION INTRAMUSCULAR; INTRAVENOUS at 08:13

## 2018-10-30 RX ADMIN — ONDANSETRON HYDROCHLORIDE 4 MG: 2 INJECTION INTRAMUSCULAR; INTRAVENOUS at 21:25

## 2018-10-30 RX ADMIN — INSULIN LISPRO 9 UNITS: 100 INJECTION, SOLUTION INTRAVENOUS; SUBCUTANEOUS at 11:57

## 2018-10-30 RX ADMIN — INSULIN LISPRO 6 UNITS: 100 INJECTION, SOLUTION INTRAVENOUS; SUBCUTANEOUS at 07:58

## 2018-10-30 RX ADMIN — ROCURONIUM BROMIDE 5 MG: 10 INJECTION, SOLUTION INTRAVENOUS at 09:14

## 2018-10-30 RX ADMIN — DEXAMETHASONE SODIUM PHOSPHATE 4 MG: 4 INJECTION, SOLUTION INTRA-ARTICULAR; INTRALESIONAL; INTRAMUSCULAR; INTRAVENOUS; SOFT TISSUE at 08:20

## 2018-10-30 RX ADMIN — SODIUM CHLORIDE, SODIUM LACTATE, POTASSIUM CHLORIDE, AND CALCIUM CHLORIDE: 600; 310; 30; 20 INJECTION, SOLUTION INTRAVENOUS at 11:20

## 2018-10-30 RX ADMIN — HYDROMORPHONE HYDROCHLORIDE 0.5 MG: 2 INJECTION INTRAMUSCULAR; INTRAVENOUS; SUBCUTANEOUS at 12:30

## 2018-10-30 RX ADMIN — PROPOFOL 200 MG: 10 INJECTION, EMULSION INTRAVENOUS at 08:13

## 2018-10-30 RX ADMIN — Medication 10 ML: at 21:25

## 2018-10-30 RX ADMIN — MIDAZOLAM HYDROCHLORIDE 2 MG: 1 INJECTION, SOLUTION INTRAMUSCULAR; INTRAVENOUS at 08:07

## 2018-10-30 RX ADMIN — FENTANYL CITRATE 50 MCG: 50 INJECTION, SOLUTION INTRAMUSCULAR; INTRAVENOUS at 08:13

## 2018-10-30 RX ADMIN — KETOROLAC TROMETHAMINE 15 MG: 30 INJECTION, SOLUTION INTRAMUSCULAR at 21:25

## 2018-10-30 RX ADMIN — ONDANSETRON 4 MG: 2 INJECTION INTRAMUSCULAR; INTRAVENOUS at 08:20

## 2018-10-30 RX ADMIN — HYDROMORPHONE HYDROCHLORIDE: 10 INJECTION, SOLUTION INTRAMUSCULAR; INTRAVENOUS; SUBCUTANEOUS at 12:35

## 2018-10-30 NOTE — ANESTHESIA PREPROCEDURE EVALUATION
Anesthetic History PONV Review of Systems / Medical History Patient summary reviewed, nursing notes reviewed and pertinent labs reviewed Pulmonary Sleep apnea: No treatment Neuro/Psych Within defined limits Cardiovascular Hypertension: well controlled Exercise tolerance: >4 METS 
  
GI/Hepatic/Renal 
Within defined limits Endo/Other Diabetes: poorly controlled, type 2 Morbid obesity Other Findings Comments: Pt is a poorly controlled diabetic. Refusing insulin. Has been advised that if BS is greater than 250, we will have to treat with insulin. Physical Exam 
 
Airway Mallampati: III 
TM Distance: 4 - 6 cm Neck ROM: normal range of motion Mouth opening: Normal 
 
 Cardiovascular Regular rate and rhythm,  S1 and S2 normal,  no murmur, click, rub, or gallop Rhythm: regular Rate: normal 
 
 
 
 Dental 
 
Dentition: Upper partial plate Pulmonary Breath sounds clear to auscultation Abdominal 
GI exam deferred Other Findings Anesthetic Plan ASA: 3 Anesthesia type: general 
 
 
 
 
Induction: Intravenous Anesthetic plan and risks discussed with: Patient Pt unwilling to receive insulin.

## 2018-10-30 NOTE — ANESTHESIA POSTPROCEDURE EVALUATION
Procedure(s): 
TOTAL ABDOMINAL HYSTERECTOMY/ BILATERAL SALPINGO-OOPHORECTOMY. Anesthesia Post Evaluation Multimodal analgesia: multimodal analgesia used between 6 hours prior to anesthesia start to PACU discharge Patient location during evaluation: PACU Patient participation: complete - patient participated Level of consciousness: awake Pain management: satisfactory to patient Airway patency: patent Anesthetic complications: no 
Cardiovascular status: acceptable Respiratory status: acceptable Hydration status: acceptable Visit Vitals /77 Pulse 77 Temp 37.1 °C (98.8 °F) Resp 17 Ht 5' 6\" (1.676 m) Wt 96.6 kg (213 lb) SpO2 100% BMI 34.38 kg/m²

## 2018-10-30 NOTE — PROGRESS NOTES
conducted an initial consultation and Spiritual Assessment for CHRISTIANO Rutledge, who is a 49 y.o.,female. Patients Primary Language is: PersonSpot. According to the patients EMR Scientology Affiliation is: [facility].     The reason the Patient came to the hospital is:   Patient Active Problem List    Diagnosis Date Noted    DUB (dysfunctional uterine bleeding) 10/30/2018    Fibroids 10/30/2018    Severe obesity (BMI 35.0-39.9) 08/16/2018    Type 2 diabetes with nephropathy ([facility] 07/05/2018    Type 2 diabetes mellitus without complication, without long-term current use of insulin ([facility] 05/12/2016    Allergic rhinitis 05/12/2016    Essential hypertension with goal blood pressure less than 130/80 05/12/2016    Anemia, unspecified 04/25/2012    Mixed hyperlipidemia 10/27/2011    DM2 (diabetes mellitus, type 2) ([facility] 06/30/2011    Essential hypertension 06/30/2011    Eczema 06/07/2011    AR (allergic rhinitis) 06/07/2011        The  provided the following Interventions:  Initiated a relationship of care and support. Explored issues of ermias, belief, spirituality and Presybeterian/ritual needs while hospitalized. Listened empathically. Provided chaplaincy education. Provided information about Spiritual Care Services. Offered prayer and assurance of continued prayers on patient's behalf. Chart reviewed. The following outcomes where achieved:  Patient shared limited information about both their medical narrative and spiritual journey/beliefs.  confirmed Patient's Scientology Affiliation. Patient processed feeling about current hospitalization. Patient expressed gratitude for 's visit. Assessment:  Patient does not have any Presybeterian/cultural needs that will affect patients preferences in health care. There are no spiritual or Presybeterian issues which require intervention at this time.      Plan:  Chaplains will continue to follow and will provide pastoral care on an as needed/requested basis.  recommends bedside caregivers page  on duty if patient shows signs of acute spiritual or emotional distress.     [address] Catrina Carrillo   [phone]

## 2018-10-30 NOTE — PERIOP NOTES
TRANSFER - OUT REPORT:    Verbal report [facility] to Iva RN/Rosa [facility] to [address] [place] [address](unit) for routine post - op       Report consisted of patients Situation, Background, Assessment and   Recommendations(SBAR). Information from the following report(s) SBAR, OR Summary, Procedure Summary, Intake/Output and MAR was reviewed with the receiving nurse. Lines:   Peripheral IV 10/30/18 Right Antecubital (Active)   Site Assessment Clean, dry, & intact 10/30/2018 11:47 AM   Phlebitis Assessment 0 10/30/2018 11:47 AM   Infiltration Assessment 0 10/30/2018 11:47 AM   Dressing Status Clean, dry, & intact 10/30/2018 11:47 AM   Dressing Type Transparent;Tape 10/30/2018 11:47 AM   Hub Color/Line Status Pink; Infusing 10/30/2018 11:47 AM   Alcohol Cap Used No 10/30/2018  6:41 AM        Opportunity for questions and clarification was provided.       Patient transported with:   O2 @ 3 liters  Tech

## 2018-10-30 NOTE — PERIOP NOTES
Pt - Pt states she does not take insulin. Explain to her it is protocol for the surgery process.  Pt states \"I do not want the insulin\"

## 2018-10-30 NOTE — H&P
H&P     Patient: Quique Batista                MRN: [id number]              Date: 10/29/18                Age:  49 y.o.,                          Sex: female                 Date of 10/30/18 Birth:  1970     Quique Batista is a 49y.o. year-old female, G 4 P 4  whose last  menstrual period was 6/30/18 . She had 4 previous C/Sections and had BTL in 1993. She has been complaining of abnormal vaginal bleeding, anemia and  uterine fibroids. She is scheduled for aTAH & BS.     Review of Systems: General, Cardiovascular, Respiratory, Gastrointestinal, Genitourinary, Neuropsychiatry, Musculoskeletal. Patient denies any problems associated with these systems except for of hypertension, and DM.      General Examination: She is a well-developed, well-nourished female in no acute distress. HEENT--all within normal limits. Lungs--clear to A&P. Cardiovascular system--normal sinus rhythm, no murmurs or               gallop. Abdomen--soft, nontender, and normal active. Pelvic exam--External genitalia and BUS--normal.                                   Cervix: Normal                          Vagina: vaginal discharge white and odorless                          Uterus: enlarged, 12 weeks size, irregular, retroverted                          Adnexa: normal bimanual exam     Impression. --Symptomatic Uterine Fibroids. Diabetes Mellitus,                          Hypertension. Anemia.     Plan: --She is scheduled for NATO and Bilateral Salpingectomy.                   Daniel Cobian MD  Oct 29/20 18.

## 2018-10-30 NOTE — PROGRESS NOTES
Reason for Admission:   Symptomatic urterine fibroids; fibroids; dysfunctional uterine bleeding                    RRAT Score:  8                   Plan for utilizing home health:      No orders at this time                    Likelihood of Readmission:  Low                         Transition of Care Plan:  Discharge plan is to return home with . Met with pt with  and daughters present with consent and discussed discharge. Pt lives with  and children. Prior admission, pt was independent with ADL's and able to drive self to MD appointments. Own no DME or used any community resources. No needs identified from CM at this time. Maggie Danielson ([phone]) or Cristina Cary, claude ([phone]) will provide transportation at discharge. CM will continue to monitor for transitional needs.        ARNOLD Narvaez, [phone]               Care Management Interventions  PCP Verified by CM: (per pt, saw PCP in August.)  Mode of Transport at Discharge: Self  Transition of Care Consult (CM Consult): Discharge Planning  MyChart Signup: No  Discharge Durable Medical Equipment: No  Physical Therapy Consult: No  Occupational Therapy Consult: No  Speech Therapy Consult: No  Current Support Network: Lives with Spouse  Confirm Follow Up Transport: Family  Plan discussed with Pt/Family/Caregiver: No  [place] Resource Information Provided?: No  Discharge Location  Discharge Placement: Home

## 2018-10-31 LAB
BASOPHILS # BLD: 0 K/UL (ref 0–0.1)
BASOPHILS NFR BLD: 0 % (ref 0–2)
DIFFERENTIAL METHOD BLD: ABNORMAL
EOSINOPHIL # BLD: 0 K/UL (ref 0–0.4)
EOSINOPHIL NFR BLD: 0 % (ref 0–5)
ERYTHROCYTE [DISTWIDTH] IN BLOOD BY AUTOMATED COUNT: 15.2 % (ref 11.6–14.5)
GLUCOSE BLD STRIP.AUTO-MCNC: 271 MG/DL (ref 70–110)
HCT VFR BLD AUTO: 25.5 % (ref 35–45)
HGB BLD-MCNC: 7.8 G/DL (ref 12–16)
LYMPHOCYTES # BLD: 1.8 K/UL (ref 0.9–3.6)
LYMPHOCYTES NFR BLD: 18 % (ref 21–52)
MCH RBC QN AUTO: 23.7 PG (ref 24–34)
MCHC RBC AUTO-ENTMCNC: 30.6 G/DL (ref 31–37)
MCV RBC AUTO: 77.5 FL (ref 74–97)
MONOCYTES # BLD: 0.5 K/UL (ref 0.05–1.2)
MONOCYTES NFR BLD: 5 % (ref 3–10)
NEUTS SEG # BLD: 7.6 K/UL (ref 1.8–8)
NEUTS SEG NFR BLD: 77 % (ref 40–73)
PLATELET # BLD AUTO: 268 K/UL (ref 135–420)
PMV BLD AUTO: 10.3 FL (ref 9.2–11.8)
RBC # BLD AUTO: 3.29 M/UL (ref 4.2–5.3)
WBC # BLD AUTO: 9.9 K/UL (ref 4.6–13.2)

## 2018-10-31 PROCEDURE — 65270000029 HC RM PRIVATE

## 2018-10-31 PROCEDURE — 74011250636 HC RX REV CODE- 250/636: Performed by: OBSTETRICS & GYNECOLOGY

## 2018-10-31 PROCEDURE — 74011250637 HC RX REV CODE- 250/637: Performed by: OBSTETRICS & GYNECOLOGY

## 2018-10-31 PROCEDURE — 36415 COLL VENOUS BLD VENIPUNCTURE: CPT | Performed by: OBSTETRICS & GYNECOLOGY

## 2018-10-31 PROCEDURE — 85025 COMPLETE CBC W/AUTO DIFF WBC: CPT | Performed by: OBSTETRICS & GYNECOLOGY

## 2018-10-31 PROCEDURE — 82962 GLUCOSE BLOOD TEST: CPT

## 2018-10-31 PROCEDURE — 77030027138 HC INCENT SPIROMETER -A

## 2018-10-31 RX ORDER — LANOLIN ALCOHOL/MO/W.PET/CERES
1 CREAM (GRAM) TOPICAL
Status: DISCONTINUED | OUTPATIENT
Start: 2018-11-01 | End: 2018-11-01 | Stop reason: HOSPADM

## 2018-10-31 RX ORDER — OXYCODONE AND ACETAMINOPHEN 5; 325 MG/1; MG/1
1-2 TABLET ORAL
Qty: 12 TAB | Refills: 0 | Status: SHIPPED | OUTPATIENT
Start: 2018-10-31 | End: 2019-02-13 | Stop reason: ALTCHOICE

## 2018-10-31 RX ORDER — OXYCODONE AND ACETAMINOPHEN 5; 325 MG/1; MG/1
1-2 TABLET ORAL
Status: DISCONTINUED | OUTPATIENT
Start: 2018-10-31 | End: 2018-11-01 | Stop reason: HOSPADM

## 2018-10-31 RX ORDER — IBUPROFEN 400 MG/1
800 TABLET ORAL
Status: DISCONTINUED | OUTPATIENT
Start: 2018-10-31 | End: 2018-10-31 | Stop reason: SDUPTHER

## 2018-10-31 RX ORDER — IBUPROFEN 800 MG/1
800 TABLET ORAL
Qty: 40 TAB | Refills: 1 | Status: SHIPPED | OUTPATIENT
Start: 2018-10-31 | End: 2019-05-15 | Stop reason: ALTCHOICE

## 2018-10-31 RX ORDER — POLYETHYLENE GLYCOL 3350 17 G/17G
17 POWDER, FOR SOLUTION ORAL DAILY
Status: DISCONTINUED | OUTPATIENT
Start: 2018-11-01 | End: 2018-11-01 | Stop reason: HOSPADM

## 2018-10-31 RX ORDER — IBUPROFEN 400 MG/1
800 TABLET ORAL
Status: DISCONTINUED | OUTPATIENT
Start: 2018-10-31 | End: 2018-11-01 | Stop reason: HOSPADM

## 2018-10-31 RX ORDER — LANOLIN ALCOHOL/MO/W.PET/CERES
325 CREAM (GRAM) TOPICAL
Qty: 60 TAB | Refills: 10 | Status: SHIPPED | OUTPATIENT
Start: 2018-10-31 | End: 2019-05-15 | Stop reason: ALTCHOICE

## 2018-10-31 RX ORDER — LISINOPRIL 40 MG/1
40 TABLET ORAL DAILY
Status: DISCONTINUED | OUTPATIENT
Start: 2018-11-01 | End: 2018-11-01 | Stop reason: HOSPADM

## 2018-10-31 RX ORDER — HYDROCHLOROTHIAZIDE 12.5 MG/1
12.5 CAPSULE ORAL DAILY
Status: DISCONTINUED | OUTPATIENT
Start: 2018-11-01 | End: 2018-11-01 | Stop reason: HOSPADM

## 2018-10-31 RX ADMIN — KETOROLAC TROMETHAMINE 15 MG: 30 INJECTION, SOLUTION INTRAMUSCULAR at 03:28

## 2018-10-31 RX ADMIN — Medication 10 ML: at 03:28

## 2018-10-31 RX ADMIN — IBUPROFEN 800 MG: 400 TABLET, FILM COATED ORAL at 08:28

## 2018-10-31 RX ADMIN — Medication 10 ML: at 14:18

## 2018-10-31 RX ADMIN — ONDANSETRON HYDROCHLORIDE 4 MG: 2 INJECTION INTRAMUSCULAR; INTRAVENOUS at 14:18

## 2018-10-31 RX ADMIN — METFORMIN HYDROCHLORIDE: 500 TABLET ORAL at 18:27

## 2018-10-31 RX ADMIN — ONDANSETRON HYDROCHLORIDE 4 MG: 2 INJECTION INTRAMUSCULAR; INTRAVENOUS at 03:28

## 2018-10-31 RX ADMIN — Medication 10 ML: at 22:29

## 2018-10-31 RX ADMIN — IBUPROFEN 800 MG: 400 TABLET, FILM COATED ORAL at 16:55

## 2018-10-31 NOTE — OP NOTES
[facility] OPERATIVE NOTE    Name: Serjio Roman   Medical Record Number: [id number]   YOB: 1970    DATE OF PROCEDURE: 10/30/2018    PREOPERATIVE DIAGNOSIS: D25.9 SYMPTOMATIC UTERINE FIBROIDS    POSTOPERATIVE DIAGNOSIS: D25.9 SYMPTOMATIC UTERINE FIBROIDS    PROCEDURE: Supracervical Abdominal Hysterectomy with bilateral  salpingoophorectomy greater than 250 grams    SURGEON: Marily Link MD    ASSISTANT: Wade Aguero. ANESTHESIA: General    SPECIMENS:   ID Type Source Tests Collected by Time Destination   1 : uterus bilateral ovaries Preservative Uterus  Hamzah Chairez MD 10/30/2018 6633 Pathology       EBL: 150 cc    FINDINGS: --Large irregular uterus. DESCRIPTION OF PROCEDURE: The patient was placed on the operating room table in the supine position and placed under general endotracheal anesthesia. Time out was done to confirm the operating procedure, surgeon, patient and site. Once confirmed by the team, procedure was started. The patient was prepped and draped in the usual fashion for abdominal surgery. Midline incision was made and was carried down sharply through the skin, subcutaneous tissues, and fascia. The fascia was then bluntly and sharply dissected free from the underlying rectus muscles. The peritoneum was sharply entered and extended vertically. The bowel was packed out of the field with four lap squares. The uterine fundus was grasped with a Corkscrew and elevated out of the abdomen. The round ligaments bilaterally with utero-ovarian ligaments were transected with the TVC-55 linear cutter. The anterior leaf of the broad ligament was then incised down on both sides and across the lower uterine segment. The bladder flap was bluntly and sharply developed. The uterine vessels were next skeletonized, bilaterally clamped with curved Z clamps, and incised. Then each was doubly ligated with 2 single stick ties of #1 Vicryl.  The uterus was then amputated from the cervix just above the level of the uterosacral ligaments insertion into the posterior cervix, and the uterus was removed from the operative field. Hemostasis was ensured. The location of the ureters was repeatedly noted during each step of the procedure. The pelvis was irrigated and suctioned clean. The cervical stump was closed with 0 Vicryl suture. The bladder peritoneum was sewn over the cervical stump with 2-0 Vicryl. The lap tape was removed as well as all other instruments. Counts were correct. The abdomen was closed with 0 vicryl on the peritoneum and 0 Vicryl on the fascia. 2-0 plain was used to approximate the subcutaneous tissues in a running fashion. Then 3-0 V-Loc 180 suture was used to close the skin using a subcuticular closure. Honeycomb was then applied. The patient tolerated the procedure well and went to the recovery room in satisfactory condition.     Signed By: Doris Myers MD

## 2018-10-31 NOTE — DISCHARGE SUMMARY
Discharge Summary     Name: Johnnie Martino MRN: [id number]  SSN: [id number]    YOB: 1970  Age: 49 y.o. Sex: female      Allergies: Codeine; Procardia [nifedipine]; Sulfa (sulfonamide antibiotics); and Tylox [oxycodone-acetaminophen]    Admit Date: 10/30/2018    Discharge Date: 10/31/2018      Admitting Physician: Laura Gibbs MD     * Admission Diagnoses: Abnormal uterine bleeding and Anemia    * Discharge Diagnoses:   Hospital Problems as of 10/31/2018 Date Reviewed: 10/31/2018          Codes Class Noted - Resolved POA    DUB (dysfunctional uterine bleeding) ICD-10-CM: N93.8  ICD-9-CM: 626.8  10/30/2018 - Present Unknown        Fibroids ICD-10-CM: D21.9  ICD-9-CM: 215.9  10/30/2018 - Present Unknown               * Procedures: Supracervical Abdominal Hysterectomy without Salpingo-Oophorectomy    * Discharge Condition: [facility] Course: Normal hospital course for this procedure. Significant Diagnostic Studies:   Recent Results (from the past 24 hour(s))   CBC WITH AUTOMATED DIFF    Collection Time: 10/31/18 12:30 PM   Result Value Ref Range    WBC 9.9 4.6 - 13.2 K/uL    RBC 3.29 (L) 4.20 - 5.30 M/uL    HGB 7.8 (L) 12.0 - 16.0 g/dL    HCT 25.5 (L) 35.0 - 45.0 %    MCV 77.5 74.0 - 97.0 FL    MCH 23.7 (L) 24.0 - 34.0 PG    MCHC 30.6 (L) 31.0 - 37.0 g/dL    RDW 15.2 (H) 11.6 - 14.5 %    PLATELET 422 516 - 650 K/uL    MPV 10.3 9.2 - 11.8 FL    NEUTROPHILS 77 (H) 40 - 73 %    LYMPHOCYTES 18 (L) 21 - 52 %    MONOCYTES 5 3 - 10 %    EOSINOPHILS 0 0 - 5 %    BASOPHILS 0 0 - 2 %    ABS. NEUTROPHILS 7.6 1.8 - 8.0 K/UL    ABS. LYMPHOCYTES 1.8 0.9 - 3.6 K/UL    ABS. MONOCYTES 0.5 0.05 - 1.2 K/UL    ABS. EOSINOPHILS 0.0 0.0 - 0.4 K/UL    ABS.  BASOPHILS 0.0 0.0 - 0.1 K/UL    DF AUTOMATED     GLUCOSE, POC    Collection Time: 10/31/18  2:47 PM   Result Value Ref Range    Glucose (POC) 271 (H) 70 - 110 mg/dL       * Disposition: Home    Discharge Medications:   Current Discharge Medication List START taking these medications    Details   ibuprofen (MOTRIN) 800 mg tablet Take 1 Tab by mouth every eight (8) hours as needed. Qty: 40 Tab, Refills: 1    Associated Diagnoses: Postoperative pain      oxyCODONE-acetaminophen (PERCOCET) 5-325 mg per tablet Take 1-2 Tabs by mouth every four (4) hours as needed. Max Daily Amount: 12 Tabs. Qty: 12 Tab, Refills: 0    Associated Diagnoses: Postoperative pain      ferrous sulfate 325 mg (65 mg iron) tablet Take 1 Tab by mouth Daily (before breakfast). Qty: 60 Tab, Refills: 10    Associated Diagnoses: Fibroids; Anemia, unspecified type         STOP taking these medications       ferrous sulfate (SLOW RELEASE IRON) 140 mg (45 mg iron) TbER Comments:   Reason for Stopping:                * Follow-up Care/Patient Instructions: Activity: No sex, douching, or tampons for 6 weeks or as directed by your physician. No heavy lifting for 6 weeks. No driving while taking pain medication.   Diet: Resume pre-hospital diet  Wound Care: Keep wound clean and dry and As directed    Follow-up Information     Follow up With Specialties Details Why Contact Info    Alo Fernandez MD [address] [address] [phone]             Signed By:  Antonio Terrell MD     October 31, 2018

## 2018-10-31 NOTE — PROGRESS NOTES
Gynecology Progress Note    Patient doing well post-op day 1 from [address] [facility] SALPINGO-OOPHORECTOMY without significant complaints. Pain controlled on current medication. Voiding without difficulty. Patient is passing flatus. Vitals:  Blood pressure 147/81, pulse 81, temperature 99.1 °F (37.3 °C), resp. rate 18, height 5' 6\" (1.676 m), weight 213 lb (96.6 kg), SpO2 100 %. Temp (24hrs), Av.4 °F (37.4 °C), Min:97.2 °F (36.2 °C), Max:100.5 °F (38.1 °C)        Exam:  Patient without distress. Abdomen soft,  nontender. Incision dry and clean without erythema. Lower extremities are negative for swelling, cords, or tenderness. Lab/Data Review:  CMP: No results found for: NA, K, CL, CO2, AGAP, GLU, BUN, CREA, GFRAA, GFRNA, CA, MG, PHOS, ALB, TBIL, TP, ALB, GLOB, AGRAT, SGOT, ALT, GPT  CBC:   Lab Results   Component Value Date/Time    WBC 9.9 10/31/2018 12:30 PM    HGB 7.8 (L) 10/31/2018 12:30 PM    HCT 25.5 (L) 10/31/2018 12:30 PM     10/31/2018 12:30 PM       Assessment and Plan:  Patient appears to be having uncomplicated post op for SUBTOTAL HYSTERECTOMY & BILATERAL SALPINGO-OOPHORECTOMY. course. Continue routine post-op care.

## 2018-10-31 NOTE — DIABETES MGMT
NUTRITIONAL ASSESSMENT GLYCEMIC CONTROL/ PLAN OF CARE     Janette SIMMONS Peoples           49 y.o.           10/30/2018               No diagnosis found. INTERVENTIONS/PLAN:   Recommend monitoring blood glucose 4 times daily ACHS  Recommend [facility] Lis[facility]  Diabetes education   ASSESSMENT:   Pt is a 49year old female with a past medical history significant for hypertension and diabetes. Pt is s/p Supracervical Abdominal hysterectomy with bilateral salpingoophorectomy. Blood glucose elevated yesterday, no glucose monitoring today. Noted pt refused insulin yesterday. Pt reports taking Janumet at home for diabetes and reports checking her sugar every morning. States she follows up with her PCP. Discussed elevated A1c and importance of blood glucose management. Pt declined information on free outpatient education classes. Pt reports having a good appetite and tolerating clear liquid diet.      Diabetes Management:   Recent blood glucose:     10/30/2018 06:51 10/30/2018 09:53 10/30/2018 11:49   200 (H) 210 (H) 259 (H)   Within target range (non-ICU: <140; ICU<180): [] Yes   [x]  No    Current Insulin regimen:   None noted   Home medication/insulin regimen:    Janumet  HbA1c: 8.5% (estimated average glucose of 197 mg/dL)  Adequate glycemic control PTA:  [] Yes  [x] No     SUBJECTIVE/OBJECTIVE:   Information obtained from: patient, chart review     Diet: Clear liquids     Medications: [x]  Reviewed      Labs:   Lab Results   Component Value Date/Time    Hemoglobin A1c 8.5 (H) 07/05/2018 04:07 PM    Hemoglobin A1c, External 6.8 08/12/2015     Lab Results   Component Value Date/Time    Sodium 141 07/05/2018 04:07 PM    Potassium 4.5 07/05/2018 04:07 PM    Chloride 101 07/05/2018 04:07 PM    CO2 23 07/05/2018 04:07 PM    Anion gap 17.0 07/05/2018 04:07 PM    Glucose 115 (H) 07/05/2018 04:07 PM    BUN 11 07/05/2018 04:07 PM    Creatinine 1.0 07/05/2018 04:07 PM    Calcium 9.5 07/05/2018 04:07 PM    Albumin 3.8 07/05/2018 04:07 PM     Anthropometrics: BMI (calculated): 34.4  Wt Readings from Last 1 Encounters:   10/30/18 96.6 kg (213 lb)      Ht Readings from Last 1 Encounters:   10/30/18 5' 6\" (1.676 m)     Estimated Nutrition Needs: 2391-1718 Kcal/day,   77-97 grams protein/day   Based on:   [x]Actual BW    [] IBW   []  Adjusted BW      Nutrition Diagnoses:    Altered nutrition related lab value related to diabetes as evidenced by Hemoglobin A1c of 8.5%   Nutrition Interventions: diabetes education, coordination of care  Goal: Blood glucose will be within target range of  mg/dL by 11/02/18     Nutrition Monitoring and Evaluation    []     Monitor po intake on meal rounds  [x]     Continue inpatient monitoring and intervention  []     Other:    Michelle Caballero RD, CDE  pgr [phone]

## 2018-10-31 NOTE — BRIEF OP NOTE
BRIEF OPERATIVE NOTE    Date of Procedure: 10/30/2018   Preoperative Diagnosis: D25.9 SYMPTOMATIC UTERINE FIBROIDS  Postoperative Diagnosis: D25.9 SYMPTOMATIC UTERINE FIBROIDS    Procedure(s): SUPRACERVICAL HYSTERECTOMY & BSO. Surgeon(s) and Role:     * Shwetha Buckner MD - Primary         Surgical Assistant: Elizabeth Bass    Surgical Staff:  Circ-1Sedyta Damon RN  Circ-Relief: Vick Snyder  Scrub Tech-1: Abner Miranda  Scrub Tech-Relief: Desiree LEYVA  Surg Asst-1: Ruth Ezra  Surg Asst-Relief: Fabio Kebede  Event Time In Time Out   Incision Start 7318    Incision Close 1132      Anesthesia: General   Estimated Blood Loss: < 100 cc. Specimens:   ID Type Source Tests Collected by Time Destination   1 : uterus bilateral ovaries Preservative Uterus  Shwetha Buckner MD 10/30/2018 6041 Pathology      Findings: --large irregular uterine fibroids and multiple adhesions.   Complications: none  Implants: * No implants in log *

## 2018-10-31 NOTE — INTERVAL H&P NOTE
H&P Update:  Pauline Baker was seen and examined. History and physical has been reviewed. The patient has been examined.  There have been no significant clinical changes since the completion of the originally dated History and Physical.    Signed By: Pamela Pickard MD     October 30, 2018 8:11 PM

## 2018-10-31 NOTE — PROGRESS NOTES
Problem: Falls - Risk of  Goal: *Absence of Falls  Document Satinder Fall Risk and appropriate interventions in the flowsheet.   Outcome: Progressing Towards Goal  Fall Risk Interventions:            Medication Interventions: Patient to call before getting OOB, Teach patient to arise slowly

## 2018-10-31 NOTE — PROGRESS NOTES
Bedside and Verbal shift change report given to Ilan Hewitt RN (oncoming nurse) by Aramis Becker RN (offgoing nurse). Report included the following information SBAR, Kardex, OR Summary, Procedure Summary, Intake/Output and MAR.

## 2018-10-31 NOTE — PROGRESS NOTES
6608  Aox3, NAD, stable, pass gas this AM per patient report, bowel sounds active to all quadrants. 26  New order received from Dr. Navya Lo. D/c Abel at 10 AM  Clear Liq Diet  Motrin 800 mg every 8 hours PRN  Percocet 5-325 1-2 tabs po Q4h PRN   On coming AM nurse aware and notified regarding above orders.

## 2018-10-31 NOTE — DIABETES MGMT
Diabetes Patient/Family Education Record    Factors That  May Influence Patients Ability  to Learn or  Comply with Recommendations   []   Language barrier    []   Cultural needs   [x]   Motivation    []   Cognitive limitation    []   Physical   []   Education    []   Physiological factors   []   Hearing/vision/speaking impairment   []   Pentecostal beliefs    []   Financial factors   []  Other:   []  No factors identified at this time.      Person Instructed:   [x]   Patient   []   Family   []  Other     Preference for Learning:   [x]   Verbal   []   Written   []  Demonstration     Level of Comprehension & Competence:    []  Good                                      [x] Fair                                     []  Poor                             [x]  Needs Reinforcement   [x]  Teachback completed    Education Component:   [x]  Medication management, including how to administer insulin (if appropriate) and potential medication interactions    [x]  Nutritional management    []  Exercise   []  Signs, symptoms, and treatment of hyperglycemia and hypoglycemia   [] Prevention, recognition and treatment of hyperglycemia and hypoglycemia   [x]  Importance of blood glucose monitoring and how to obtain a blood glucose meter    []  Instruction on use of the blood glucose meter   [x]  Discuss the importance of HbA1C monitoring    []  Sick day guidelines   []  Proper use and disposal of lancets, needles, syringes or insulin pens (if appropriate)   [x]  Potential long-term complications (retinopathy, kidney disease, neuropathy, foot care)   [] Information about whom to contact in case of emergency or for more information    [x]  Goal:  Patient/family will demonstrate understanding of Diabetes Self Management Skills by: 11/07/18  Plan for post-discharge education or self-management support:    [] Outpatient class schedule provided            [x] Patient Declined    [] Scheduled for outpatient classes (date) _______     Anali Rose RD, CDE  [facility] [phone]

## 2018-11-01 VITALS
SYSTOLIC BLOOD PRESSURE: 121 MMHG | HEIGHT: 66 IN | WEIGHT: 213 LBS | RESPIRATION RATE: 18 BRPM | DIASTOLIC BLOOD PRESSURE: 71 MMHG | TEMPERATURE: 99 F | HEART RATE: 74 BPM | OXYGEN SATURATION: 98 % | BODY MASS INDEX: 34.23 KG/M2

## 2018-11-01 LAB — GLUCOSE BLD STRIP.AUTO-MCNC: 197 MG/DL (ref 70–110)

## 2018-11-01 PROCEDURE — 74011000250 HC RX REV CODE- 250: Performed by: OBSTETRICS & GYNECOLOGY

## 2018-11-01 PROCEDURE — 74011250637 HC RX REV CODE- 250/637: Performed by: OBSTETRICS & GYNECOLOGY

## 2018-11-01 PROCEDURE — 82962 GLUCOSE BLOOD TEST: CPT

## 2018-11-01 RX ADMIN — LISINOPRIL 40 MG: 40 TABLET ORAL at 08:41

## 2018-11-01 RX ADMIN — IBUPROFEN 800 MG: 400 TABLET, FILM COATED ORAL at 10:32

## 2018-11-01 RX ADMIN — METFORMIN HYDROCHLORIDE: 500 TABLET ORAL at 08:41

## 2018-11-01 RX ADMIN — HYDROCHLOROTHIAZIDE 12.5 MG: 12.5 CAPSULE ORAL at 08:41

## 2018-11-01 RX ADMIN — Medication 10 ML: at 06:34

## 2018-11-01 RX ADMIN — POLYETHYLENE GLYCOL 3350 17 G: 17 POWDER, FOR SOLUTION ORAL at 08:41

## 2018-11-01 RX ADMIN — FERROUS SULFATE TAB 325 MG (65 MG ELEMENTAL FE) 325 MG: 325 (65 FE) TAB at 08:41

## 2018-11-01 RX ADMIN — IBUPROFEN 800 MG: 400 TABLET, FILM COATED ORAL at 01:53

## 2018-11-01 NOTE — PROGRESS NOTES
Problem: Pressure Injury - Risk of  Goal: *Prevention of pressure injury  Document Lele Scale and appropriate interventions in the flowsheet. Outcome: Progressing Towards Goal  Pressure Injury Interventions:  Sensory Interventions: Pressure redistribution bed/mattress (bed type)    Moisture Interventions: Maintain skin hydration (lotion/cream)    Activity Interventions: Assess need for specialty bed    Mobility Interventions: Pressure redistribution bed/mattress (bed type)    Nutrition Interventions: Document food/fluid/supplement intake    Friction and Shear Interventions: HOB 30 degrees or less               Problem: Falls - Risk of  Goal: *Absence of Falls  Document Satinder Fall Risk and appropriate interventions in the flowsheet.   Outcome: Progressing Towards Goal  Fall Risk Interventions:  Mobility Interventions: Patient to call before getting OOB         Medication Interventions: Patient to call before getting OOB    Elimination Interventions: Bed/chair exit alarm

## 2018-11-01 NOTE — PROGRESS NOTES
Discharge order noted for today. Met with pt and   are agreeable to the transition plan today. No needs identified from CM. Transport has been arranged with .   Av De La Rosa, LISEN, [phone]

## 2018-11-01 NOTE — ROUTINE PROCESS
0028: Pt stable and resting in bed. No pain or nausea and vomiting.  is by the bedside. Will continue with current plan of care.

## 2018-11-01 NOTE — ROUTINE PROCESS
Bedside and Verbal shift change report given to Herman Estrada (oncoming nurse) by Linda Cade RN (offgoing nurse). Report included the following information SBAR, Kardex, MAR and Recent Results.     SITUATION:    Code Status: Full Code   Reason for Admission: D25.9 SYMPTOMATIC UTERINE FIBROIDS   Fibroids   DUB (dysfunctional uterine bleeding)    [facility] day: 2   Problem List:       Hospital Problems  Date Reviewed: 10/31/2018          Codes Class Noted POA    DUB (dysfunctional uterine bleeding) ICD-10-CM: N93.8  ICD-9-CM: 626.8  10/30/2018 Unknown        Fibroids ICD-10-CM: D21.9  ICD-9-CM: 215.9  10/30/2018 Unknown              BACKGROUND:    Past Medical History:   Past Medical History:   Diagnosis Date    Anemia NEC 1987/1988/1990/1992    during pregnancy    AR (allergic rhinitis) 6/7/2011    Eczema     since childhood    Gestational diabetes 1993    x1 pregnancy    Hypertension     Type 2 diabetes mellitus without complication, without long-term current use of insulin ([facility] [address] 5/12/2016         Patient taking anticoagulants no     ASSESSMENT:    Changes in Assessment Throughout Shift: no     Patient has Central Line: no Reasons if yes: No   Patient has Roman Cath: no Reasons if yes: No      Last Vitals:     Vitals:    10/31/18 1122 10/31/18 1519 10/31/18 1722 10/31/18 1945   BP: 150/83 147/81  128/70   Pulse: 77 81  74   Resp: 18 18 18   Temp: 97.2 °F (36.2 °C) (!) 100.5 °F (38.1 °C) 99.1 °F (37.3 °C) 98.1 °F (36.7 °C)   SpO2: 100% 100%  99%   Weight:       Height:            IV and DRAINS (will only show if present)   Peripheral IV 10/30/18 Right Antecubital-Site Assessment: Clean, dry, & intact     WOUND (if present)   Wound Type:  none   Dressing present Dressing Present : Yes   Wound Concerns/Notes:  none     PAIN    Pain Assessment    Pain Intensity 1: 4 (10/31/18 1945)    Pain Location 1: Abdomen    Pain Intervention(s) 1: Medication (see MAR)    Patient Stated Pain Goal: 0  o Interventions for Pain:  none  o Intervention effective: no  o Time of last intervention: 0700   o Reassessment Completed: no      Last 3 Weights:  Last 3 Recorded Weights in this Encounter    10/23/18 1130 10/30/18 0640   Weight: 94.8 kg (209 lb) 96.6 kg (213 lb)     Weight change:      INTAKE/OUPUT    Current Shift: No intake/output data recorded. Last three shifts: 10/30 0701 - 10/31 1900  In: 1000 [I.V.:1000]  Out: 2320 [Urine:2245]     LAB RESULTS     Recent Labs     10/31/18  1230 10/30/18  0636   WBC 9.9 4.7   HGB 7.8* 9.4*   HCT 25.5* 30.3*    280      No results for input(s): NA, K, GLU, BUN, CREA, CA, MG, INR in the last 72 hours. No lab exists for component: PT, PTT, INREXT    RECOMMENDATIONS AND DISCHARGE PLANNING     1. Pending tests/procedures/ Plan of Care or Other Needs: TBD     2. Discharge plan for patient and Needs/Barriers: TBD    3. Estimated Discharge Date: TBD Posted on Whiteboard in Patients Room: no      4. The patient's care plan was reviewed with the oncoming nurse. \"HEALS\" SAFETY CHECK      Fall Risk    Total Score: 3    Safety Measures: Safety Measures: Bed/Chair alarm on, Bed/Chair-Wheels locked, Bed in low position, Call light within reach, Fall prevention (comment)    A safety check occurred in the patient's room between off going nurse and oncoming nurse listed above.     The safety check included the below items  Area Items   H  High Alert Medications - Verify all high alert medication drips (heparin, PCA, etc.)   E  Equipment - Suction is set up for ALL patients (with yanker)  - Red plugs utilized for all equipment (IV pumps, etc.)  - WOWs wiped down at end of shift.  - Room stocked with oxygen, suction, and other unit-specific supplies   A  Alarms - Bed alarm is set for fall risk patients  - Ensure chair alarm is in place and activated if patient is up in a chair   L  Lines - Check IV for any infiltration  - Roman bag is empty if patient has a Roman - Tubing and IV bags are labeled   S  Safety   - Room is clean, patient is clean, and equipment is clean. - Hallways are clear from equipment besides carts. - Fall bracelet on for fall risk patients  - Ensure room is clear and free of clutter  - Suction is set up for ALL patients (with yanker)  - Hallways are clear from equipment besides carts.    - Isolation precautions followed, supplies available outside room, sign posted     Jonn Bhardwaj RN

## 2018-11-14 ENCOUNTER — OFFICE VISIT (OUTPATIENT)
Dept: FAMILY MEDICINE CLINIC | Age: 48
End: 2018-11-14

## 2018-11-14 VITALS
RESPIRATION RATE: 18 BRPM | BODY MASS INDEX: 34.33 KG/M2 | HEART RATE: 71 BPM | SYSTOLIC BLOOD PRESSURE: 165 MMHG | TEMPERATURE: 98.5 F | HEIGHT: 66 IN | WEIGHT: 213.6 LBS | DIASTOLIC BLOOD PRESSURE: 95 MMHG

## 2018-11-14 DIAGNOSIS — E11.9 TYPE 2 DIABETES MELLITUS WITHOUT COMPLICATION, WITHOUT LONG-TERM CURRENT USE OF INSULIN (HCC): Primary | ICD-10-CM

## 2018-11-14 DIAGNOSIS — I10 ESSENTIAL HYPERTENSION: ICD-10-CM

## 2018-11-14 DIAGNOSIS — K59.09 CHRONIC CONSTIPATION: ICD-10-CM

## 2018-11-14 DIAGNOSIS — D50.0 IRON DEFICIENCY ANEMIA DUE TO CHRONIC BLOOD LOSS: ICD-10-CM

## 2018-11-14 DIAGNOSIS — Z90.710 S/P HYSTERECTOMY WITH OOPHORECTOMY: ICD-10-CM

## 2018-11-14 DIAGNOSIS — Z90.721 S/P HYSTERECTOMY WITH OOPHORECTOMY: ICD-10-CM

## 2018-11-14 NOTE — PROGRESS NOTES
Prisca Harris presents today for   Chief Complaint   Patient presents with    Hypertension     follow up    Constipation     chronic       Nayomi E Peoples preferred language for health care discussion is english/other. Is someone accompanying this pt? no    Is the patient using any DME equipment during OV? no    Depression Screening:  PHQ over the last two weeks 10/16/2018   PHQ Not Done -   Little interest or pleasure in doing things Not at all   Feeling down, depressed, irritable, or hopeless Not at all   Total Score PHQ 2 0   Trouble falling or staying asleep, or sleeping too much -   Feeling tired or having little energy -   Poor appetite, weight loss, or overeating -   Feeling bad about yourself - or that you are a failure or have let yourself or your family down -   Trouble concentrating on things such as school, work, reading, or watching TV -   Moving or speaking so slowly that other people could have noticed; or the opposite being so fidgety that others notice -   Thoughts of being better off dead, or hurting yourself in some way -   PHQ 9 Score -   How difficult have these problems made it for you to do your work, take care of your home and get along with others -       Learning Assessment:  Learning Assessment 8/16/2018   PRIMARY LEARNER Patient   HIGHEST LEVEL OF EDUCATION - PRIMARY LEARNER  4 YEARS OF COLLEGE   BARRIERS PRIMARY LEARNER NONE   CO-LEARNER CAREGIVER No   PRIMARY LANGUAGE ENGLISH   LEARNER PREFERENCE PRIMARY DEMONSTRATION   ANSWERED BY patient   RELATIONSHIP SELF       Abuse Screening:  Abuse Screening Questionnaire 8/16/2018   Do you ever feel afraid of your partner? N   Are you in a relationship with someone who physically or mentally threatens you? N   Is it safe for you to go home? Y           Coordination of Care:  1. Have you been to the ER, urgent care clinic since your last visit? Hospitalized since your last visit? Yes 10/30/18 for abdominal surgery at [facility]    2. Have you seen or consulted any other health care providers outside of the [address] [place] [address] since your last visit? Include any pap smears or colon screening. no      Advance Directive:  1. Do you have an advance directive in place? Patient Reply:no    2. If not, would you like material regarding how to put one in place?  Patient Reply: no

## 2018-11-14 NOTE — PROGRESS NOTES
Chief Complaint   Patient presents with    Hypertension     follow up    Constipation     chronic     HISTORY OF PRESENT ILLNESS  Ember Prado is a 49 y.o. female. HPI  Patient is here for a 3 mo follow up of dm, htn, constipation. She is feeling well overall. Home bs readings have been slightly elevated lately; pt admits that she has been eating candy lately. She will be able to start walking next week. Patient had labs on 10/31/18 prior to d/c from the hospital.  Labs reviewed in detail with patient. No recent lipids or A1c. Patient does not need medication refills today. New concerns today: pt reports that she has had her hyst with bso. The cervix was not removed due to scar tissue near the urethra. Pt was told that she needs to cont to have paps. Pt notes that her bp was wnl when she was in the hospital.   Pt was told to cont an iron supplement. She is now on slow-Fe and is taking it daily. Her miralax is helping her to not be constipated. She is using it daily. ROS  Review of Systems   Constitutional: Negative. HENT: Negative. Respiratory: Negative. Cardiovascular: Negative. All other systems reviewed and are negative. Physical Exam  Physical Exam   Nursing note and vitals reviewed. Constitutional: She is oriented to person, place, and time. She appears well-developed and well-nourished. HENT:   Head: Normocephalic and atraumatic. Right Ear: External ear normal.   Left Ear: External ear normal.   Nose: Nose normal.   Eyes: Conjunctivae and EOM are normal.   Neck: Normal range of motion. Neck supple. No JVD present. Carotid bruit is not present. No thyromegaly present. Cardiovascular: Normal rate, regular rhythm, normal heart sounds and intact distal pulses. Exam reveals no gallop and no friction rub. No murmur heard. Pulmonary/Chest: Effort normal and breath sounds normal. She has no wheezes. She has no rhonchi. She has no rales. Abdominal: Soft. Bowel sounds are normal.   Musculoskeletal: Normal range of motion. Neurological: She is alert and oriented to person, place, and time. Coordination normal.   Skin: Skin is warm and dry. Psychiatric: She has a normal mood and affect. Her behavior is normal. Judgment and thought content normal.     ASSESSMENT and PLAN  Diagnoses and all orders for this visit:    1. Type 2 diabetes mellitus without complication, without long-term current use of insulin ([address]  Not at goal.  Patient will work on diet. She will resume exercise as tolerated. Continue present medication regimen. Continue home monitoring. Labs pending. 2. Essential hypertension  Elevated today. Home readings have been better. Continue to monitor. Labs pending. 3. Chronic constipation  Stable. Continue present treatment plan    4. S/P hysterectomy with oophorectomy  Care as per gynecology    5. Iron deficiency anemia due to chronic blood loss  CBC and iron profile ordered. Continue iron supplement      Follow-up Disposition:  Return in about 3 months (around 2/14/2019) for diabetes, high blood pressure.

## 2018-11-14 NOTE — PATIENT INSTRUCTIONS
High Blood Pressure: Care Instructions  Your Care Instructions    If your blood pressure is usually above 130/80, you have high blood pressure, or hypertension. That means the top number is 130 or higher or the bottom number is 80 or higher, or both. Despite what a lot of people think, high blood pressure usually doesn't cause headaches or make you feel dizzy or lightheaded. It usually has no symptoms. But it does increase your risk for heart attack, stroke, and kidney or eye damage. The higher your blood pressure, the more your risk increases. Your doctor will give you a goal for your blood pressure. Your goal will be based on your health and your age. Lifestyle changes, such as eating healthy and being active, are always important to help lower blood pressure. You might also take medicine to reach your blood pressure goal.  Follow-up care is a key part of your treatment and safety. Be sure to make and go to all appointments, and call your doctor if you are having problems. It's also a good idea to know your test results and keep a list of the medicines you take. How can you care for yourself at home? Medical treatment  · If you stop taking your medicine, your blood pressure will go back up. You may take one or more types of medicine to lower your blood pressure. Be safe with medicines. Take your medicine exactly as prescribed. Call your doctor if you think you are having a problem with your medicine. · Talk to your doctor before you start taking aspirin every day. Aspirin can help certain people lower their risk of a heart attack or stroke. But taking aspirin isn't right for everyone, because it can cause serious bleeding. · See your doctor regularly. You may need to see the doctor more often at first or until your blood pressure comes down. · If you are taking blood pressure medicine, talk to your doctor before you take decongestants or anti-inflammatory medicine, such as ibuprofen.  Some of these medicines can raise blood pressure. · Learn how to check your blood pressure at home. Lifestyle changes  · Stay at a healthy weight. This is especially important if you put on weight around the waist. Losing even 10 pounds can help you lower your blood pressure. · If your doctor recommends it, get more exercise. Walking is a good choice. Bit by bit, increase the amount you walk every day. Try for at least 30 minutes on most days of the week. You also may want to swim, bike, or do other activities. · Avoid or limit alcohol. Talk to your doctor about whether you can drink any alcohol. · Try to limit how much sodium you eat to less than 2,300 milligrams (mg) a day. Your doctor may ask you to try to eat less than 1,500 mg a day. · Eat plenty of fruits (such as bananas and oranges), vegetables, legumes, whole grains, and low-fat dairy products. · Lower the amount of saturated fat in your diet. Saturated fat is found in animal products such as milk, cheese, and meat. Limiting these foods may help you lose weight and also lower your risk for heart disease. · Do not smoke. Smoking increases your risk for heart attack and stroke. If you need help quitting, talk to your doctor about stop-smoking programs and medicines. These can increase your chances of quitting for good. When should you call for help? Call 911 anytime you think you may need emergency care. This may mean having symptoms that suggest that your blood pressure is causing a serious heart or blood vessel problem. Your blood pressure may be over 180/120.   For example, call 911 if:    · You have symptoms of a heart attack. These may include:  ? Chest pain or pressure, or a strange feeling in the chest.  ? Sweating. ? Shortness of breath. ? Nausea or vomiting. ? Pain, pressure, or a strange feeling in the back, neck, jaw, or upper belly or in one or both shoulders or arms. ? Lightheadedness or sudden weakness.   ? A fast or irregular heartbeat.     · You have symptoms of a stroke. These may include:  ? Sudden numbness, tingling, weakness, or loss of movement in your face, arm, or leg, especially on only one side of your body. ? Sudden vision changes. ? Sudden trouble speaking. ? Sudden confusion or trouble understanding simple statements. ? Sudden problems with walking or balance. ? A sudden, severe headache that is different from past headaches.     · You have severe back or belly pain.    Do not wait until your blood pressure comes down on its own. Get help right away.   Call your doctor now or seek immediate care if:    · Your blood pressure is much higher than normal (such as 180/120 or higher), but you don't have symptoms.     · You think high blood pressure is causing symptoms, such as:  ? Severe headache.  ? Blurry vision.    Watch closely for changes in your health, and be sure to contact your doctor if:    · Your blood pressure measures higher than your doctor recommends at least 2 times. That means the top number is higher or the bottom number is higher, or both.     · You think you may be having side effects from your blood pressure medicine. Where can you learn more? Go to http://samuel-kelsey.info/. Enter M677 in the search box to learn more about \"High Blood Pressure: Care Instructions. \"  Current as of: December 6, 2017  Content Version: 11.8  © 9014-1235 Healthwise, [employer]. Care instructions adapted under license by [employer] (which disclaims liability or warranty for this information). If you have questions about a medical condition or this instruction, always ask your healthcare professional. [place] [address] any warranty or liability for your use of this information.

## 2018-11-15 ENCOUNTER — OFFICE VISIT (OUTPATIENT)
Dept: OBGYN CLINIC | Age: 48
End: 2018-11-15

## 2018-11-15 VITALS
WEIGHT: 216.8 LBS | RESPIRATION RATE: 16 BRPM | OXYGEN SATURATION: 99 % | SYSTOLIC BLOOD PRESSURE: 153 MMHG | DIASTOLIC BLOOD PRESSURE: 84 MMHG | HEIGHT: 66 IN | TEMPERATURE: 98.2 F | HEART RATE: 60 BPM | BODY MASS INDEX: 34.84 KG/M2

## 2018-11-15 DIAGNOSIS — Z09 POSTOP CHECK: Primary | ICD-10-CM

## 2018-11-16 NOTE — PATIENT INSTRUCTIONS
Surgery: What to Expect at [facility] Your Recovery This care sheet gives you a general idea about how long it will take for you to recover. But each person recovers at a different pace. Follow the steps below to get better as quickly as possible. How can you care for yourself at home? Activity 
  · Allow your body to heal. Don't move quickly or lift anything heavy until you are feeling better.  
  · Rest when you feel tired.  
  · Your doctor may give you specific instructions on when you can do your normal activities again, such as driving and going back to work.  
  · Be active. Walking is a good choice. Diet 
  · You can eat your normal diet when you feel well. If your stomach is upset, try bland, low-fat foods like plain rice, broiled chicken, toast, and yogurt.  
  · If your bowel movements are not regular right after surgery, try to avoid constipation and straining. Drink plenty of water. Your doctor may suggest fiber, a stool softener, or a mild laxative. Medicines 
  · Your doctor will tell you if and when you can restart your medicines. He or she will also give you instructions about taking any new medicines.  
  · If you take blood thinners, such as warfarin (Coumadin), clopidogrel (Plavix), or aspirin, be sure to talk to your doctor. He or she will tell you if and when to start taking those medicines again. Make sure that you understand exactly what your doctor wants you to do.  
  · Be safe with medicines. Read and follow all instructions on the label. ? If the doctor gave you a prescription medicine for pain, take it as prescribed. ? If you are not taking a prescription pain medicine, ask your doctor if you can take an over-the-counter medicine. Incision care 
 If your doctor told you how to care for your cut (incision), follow your doctor's instructions. If you did not get instructions, follow this general advice: 
  · You will have a dressing over the cut.  A dressing helps the incision heal and protects it. Your doctor will tell you how to take care of this.  
  · If you have strips of tape on the cut the doctor made, leave the tape on for a week or until it falls off.  
  · If you had stitches or staples, your doctor will tell you when to come back to have them removed.  
  · If you have skin adhesive on the cut, leave it on until it falls off. Skin adhesive is also called liquid stitches.  
  · Change the bandage every day.  
  · Wash the area daily with warm water, and pat it dry. Don't use hydrogen peroxide or alcohol. They can slow healing.  
  · You may cover the area with a gauze bandage if it oozes fluid or rubs against clothing.  
  · You may shower 24 to 48 hours after surgery. Pat the incision dry. Don't swim or take a bath for the first 2 weeks, or until your doctor tells you it is okay. Follow-up care is a key part of your treatment and safety. Be sure to make and go to all appointments, and call your doctor if you are having problems. It's also a good idea to know your test results and keep a list of the medicines you take. When should you call for help? Call 911 anytime you think you may need emergency care. For example, call if: 
  · You passed out (lost consciousness).  
  · You are short of breath.  
 Call your doctor now or seek immediate medical care if: 
  · You have pain that does not get better after you take pain medicine.  
  · You cannot pass stool or gas.  
  · You are sick to your stomach and cannot drink fluids.  
  · You have loose stitches, or your incision comes open.  
  · You have signs of a blood clot in your leg (called a deep vein thrombosis), such as: 
? Pain in your calf, back of the knee, thigh, or groin. ? Redness and swelling in your leg or groin.  
  · You have signs of infection, such as: 
? Increased pain, swelling, warmth, or redness. ? Red streaks leading from the incision. ? Pus draining from the incision. ? A fever.   · Bright red blood has soaked through your bandage.  
 Watch closely for any changes in your health, and be sure to contact your doctor if you have any problems. Where can you learn more? Go to http://samuel-kelsey.info/. Enter T872 in the search box to learn more about \"Surgery: What to Expect at Home. \" Current as of: March 29, 2018 Content Version: 11.8 © 2604-0353 Healthwise, [employer]. Care instructions adapted under license by [employer] (which disclaims liability or warranty for this information). If you have questions about a medical condition or this instruction, always ask your healthcare professional. [address] any warranty or liability for your use of this information.

## 2018-11-16 NOTE — PROGRESS NOTES
,who is 2 weeks postop check for Supracervical Hysterectomy and BS. Sybil Venegas She is doing well. Incision is healing well. Examination: abd soft and nontender. Pelvic deferred. RTC 4 weeks.

## 2018-11-21 ENCOUNTER — HOSPITAL ENCOUNTER (OUTPATIENT)
Dept: LAB | Age: 48
Discharge: HOME | End: 2018-11-21

## 2018-11-21 LAB — SENTARA SPECIMEN COL,SENBCF: NORMAL

## 2018-11-21 PROCEDURE — 99001 SPECIMEN HANDLING PT-LAB: CPT

## 2018-11-22 LAB
A-G RATIO,AGRAT: 1.3 RATIO (ref 1.1–2.6)
ALBUMIN SERPL-MCNC: 4.1 G/DL (ref 3.5–5)
ALP SERPL-CCNC: 120 U/L (ref 25–115)
ALT SERPL-CCNC: 14 U/L (ref 5–40)
ANION GAP SERPL CALC-SCNC: 14 MMOL/L
AST SERPL W P-5'-P-CCNC: 16 U/L (ref 10–37)
AVG GLU, 10930: 207 MG/DL (ref 91–123)
BILIRUB SERPL-MCNC: 0.1 MG/DL (ref 0.2–1.2)
BUN SERPL-MCNC: 12 MG/DL (ref 6–22)
CALCIUM SERPL-MCNC: 10.2 MG/DL (ref 8.4–10.5)
CHLORIDE SERPL-SCNC: 99 MMOL/L (ref 98–110)
CHOLEST SERPL-MCNC: 229 MG/DL (ref 110–200)
CO2 SERPL-SCNC: 26 MMOL/L (ref 20–32)
CREAT SERPL-MCNC: 0.9 MG/DL (ref 0.5–1.2)
GFRAA, 66117: >60
GFRNA, 66118: >60
GLOBULIN,GLOB: 3.2 G/DL (ref 2–4)
GLUCOSE SERPL-MCNC: 134 MG/DL (ref 70–99)
HBA1C MFR BLD HPLC: 8.8 % (ref 4.8–5.9)
HDLC SERPL-MCNC: 4.4 MG/DL (ref 0–5)
HDLC SERPL-MCNC: 52 MG/DL (ref 40–59)
LDLC SERPL CALC-MCNC: 153 MG/DL (ref 50–99)
POTASSIUM SERPL-SCNC: 4.2 MMOL/L (ref 3.5–5.5)
PROT SERPL-MCNC: 7.3 G/DL (ref 6.4–8.3)
SODIUM SERPL-SCNC: 139 MMOL/L (ref 133–145)
TRIGL SERPL-MCNC: 121 MG/DL (ref 40–149)
VLDLC SERPL CALC-MCNC: 24 MG/DL (ref 8–30)

## 2018-11-27 DIAGNOSIS — E11.9 TYPE 2 DIABETES MELLITUS WITHOUT COMPLICATION, WITHOUT LONG-TERM CURRENT USE OF INSULIN (HCC): ICD-10-CM

## 2018-11-28 RX ORDER — SITAGLIPTIN AND METFORMIN HYDROCHLORIDE 500; 50 MG/1; MG/1
TABLET, FILM COATED ORAL
Qty: 180 TAB | Refills: 1 | Status: SHIPPED | OUTPATIENT
Start: 2018-11-28 | End: 2019-05-15 | Stop reason: SDUPTHER

## 2018-11-29 ENCOUNTER — HOSPITAL ENCOUNTER (OUTPATIENT)
Dept: MAMMOGRAPHY | Age: 48
Discharge: HOME | End: 2018-11-29
Attending: OBSTETRICS & GYNECOLOGY
Payer: COMMERCIAL

## 2018-11-29 DIAGNOSIS — Z12.39 SCREENING BREAST EXAMINATION: ICD-10-CM

## 2018-11-29 PROCEDURE — 77067 SCR MAMMO BI INCL CAD: CPT

## 2018-12-12 ENCOUNTER — OFFICE VISIT (OUTPATIENT)
Dept: OBGYN CLINIC | Age: 48
End: 2018-12-12

## 2018-12-12 DIAGNOSIS — Z09 POSTOP CHECK: Primary | ICD-10-CM

## 2018-12-12 NOTE — PROGRESS NOTES
,who is 6 weeks postop check for Supracervical hysterectomy and BS. She is doing well. and incision is healing well. Examination: abd soft and tenderness.                           Pelvic --normal pelvic exam    RTC prn

## 2018-12-13 VITALS
OXYGEN SATURATION: 99 % | SYSTOLIC BLOOD PRESSURE: 187 MMHG | WEIGHT: 215 LBS | BODY MASS INDEX: 34.55 KG/M2 | RESPIRATION RATE: 17 BRPM | DIASTOLIC BLOOD PRESSURE: 87 MMHG | TEMPERATURE: 96.7 F | HEART RATE: 74 BPM | HEIGHT: 66 IN

## 2018-12-13 NOTE — PATIENT INSTRUCTIONS
Learning About Metformin for Type 2 Diabetes  Introduction    Metformin (such as Glucophage) is a medicine used to treat type 2 diabetes. It helps keep blood sugar levels on target. You may have tried to eat a healthy diet, lose weight, and get more exercise to keep your blood sugar in your target range. If those things do not help, you may take a medicine called metformin. It helps your body use insulin. This can help you control your blood sugar. You might take it on its own or with other medicines. When taken on its own, metformin should not cause low blood sugar or weight gain. Example  · Metformin (Glucophage)  Possible side effects  Common side effects include:  · Short-term nausea. · Not feeling hungry. · Diarrhea. · Increased gas in your belly. · A metallic taste. You may have side effects or reactions not listed here. Check the information that comes with your medicine. What to know about taking this medicine  · Metformin does not usually cause low blood sugar. But you may get a low blood sugar when you take metformin and you exercise hard, drink alcohol, or you do not eat enough food. · Sometimes metformin is combined with other diabetes medicine. Some of these can cause low blood sugar. · If you need a test that uses a dye or you need to have surgery, be sure to tell all of your doctors that you take metformin. You may have to stop taking it before and after the test or surgery. · Over time, blood levels of vitamin B12 can decrease in some people who take metformin. Your body needs this B vitamin to make blood cells. It also keeps your nervous system healthy. If you have been taking metformin for more than a few years, ask your doctor if you need a B12 blood test to measure the amount of vitamin B12 in your blood. · Be safe with medicines. Take your medicines exactly as prescribed. Call your doctor if you think you are having a problem with your medicine.   · Check with your doctor or pharmacist before you use any other medicines. This includes over-the-counter medicines. Make sure your doctor knows all of the medicines, vitamins, herbal products, and supplements you take. Taking some medicines together can cause problems. Where can you learn more? Go to http://samuel-kelsey.info/. Enter Skylar Caballero in the search box to learn more about \"Learning About Metformin for Type 2 Diabetes. \"  Current as of: December 7, 2017  Content Version: 11.8  © 7852-8301 [employer]. Care instructions adapted under license by [employer] (which disclaims liability or warranty for this information). If you have questions about a medical condition or this instruction, always ask your healthcare professional. [address] any warranty or liability for your use of this information. Learning About Metformin for Type 2 Diabetes  Introduction    Metformin (such as Glucophage) is a medicine used to treat type 2 diabetes. It helps keep blood sugar levels on target. You may have tried to eat a healthy diet, lose weight, and get more exercise to keep your blood sugar in your target range. If those things do not help, you may take a medicine called metformin. It helps your body use insulin. This can help you control your blood sugar. You might take it on its own or with other medicines. When taken on its own, metformin should not cause low blood sugar or weight gain. Example  · Metformin (Glucophage)  Possible side effects  Common side effects include:  · Short-term nausea. · Not feeling hungry. · Diarrhea. · Increased gas in your belly. · A metallic taste. You may have side effects or reactions not listed here. Check the information that comes with your medicine. What to know about taking this medicine  · Metformin does not usually cause low blood sugar.  But you may get a low blood sugar when you take metformin and you exercise hard, drink alcohol, or you do not eat enough food. · Sometimes metformin is combined with other diabetes medicine. Some of these can cause low blood sugar. · If you need a test that uses a dye or you need to have surgery, be sure to tell all of your doctors that you take metformin. You may have to stop taking it before and after the test or surgery. · Over time, blood levels of vitamin B12 can decrease in some people who take metformin. Your body needs this B vitamin to make blood cells. It also keeps your nervous system healthy. If you have been taking metformin for more than a few years, ask your doctor if you need a B12 blood test to measure the amount of vitamin B12 in your blood. · Be safe with medicines. Take your medicines exactly as prescribed. Call your doctor if you think you are having a problem with your medicine. · Check with your doctor or pharmacist before you use any other medicines. This includes over-the-counter medicines. Make sure your doctor knows all of the medicines, vitamins, herbal products, and supplements you take. Taking some medicines together can cause problems. Where can you learn more? Go to http://samuel-kelsey.info/. Enter Carlo Teague in the search box to learn more about \"Learning About Metformin for Type 2 Diabetes. \"  Current as of: December 7, 2017  Content Version: 11.8  © 5649-5253 [employer]. Care instructions adapted under license by [employer] (which disclaims liability or warranty for this information). If you have questions about a medical condition or this instruction, always ask your healthcare professional. [place] [address] any warranty or liability for your use of this information. Acute Pain After Surgery: Care Instructions  Your Care Instructions    It's common to have some pain after surgery. Pain doesn't mean that something is wrong or that the surgery didn't go well.  But when the pain is severe, it's important to work with your doctor to manage it. It's also important to be aware of a few facts about pain and pain medicine. · You are the only person who knows what your pain feels like. So be sure to tell your doctor when you are in pain or when the pain changes. Then he or she will know how to adjust your medicines. · Pain is often easier to control right after it starts. So it may be better to take regular doses of pain medicine and not wait until the pain gets bad. · Medicine can help control pain. But this doesn't mean you'll have no pain. Medicine works to keep the pain at a level you can live with. With time, you will feel better. Follow-up care is a key part of your treatment and safety. Be sure to make and go to all appointments, and call your doctor if you are having problems. It's also a good idea to know your test results and keep a list of the medicines you take. How can you care for yourself at home? · Be safe with medicines. Read and follow all instructions on the label. ? If the doctor gave you a prescription medicine for pain, take it as prescribed. ? If you are not taking a prescription pain medicine, ask your doctor if you can take an over-the-counter medicine. · If you take an over-the-counter pain medicine, such as acetaminophen (Tylenol), ibuprofen (Advil, Motrin), or naproxen (Aleve), read and follow all instructions on the label. · Do not take two or more pain medicines at the same time unless the doctor told you to. · Do not drink alcohol while you are taking pain medicines. · Try to walk each day if your doctor recommends it. Start by walking a little more than you did the day before. Bit by bit, increase the amount you walk. Walking increases blood flow. It also helps prevent pneumonia and constipation. · To prevent constipation from opioid pain medicines:  ? Talk to your doctor about a laxative. ? Include fruits, vegetables, beans, and whole grains in your diet each day.  These foods are high in fiber. ? Drink plenty of fluids, enough so that your urine is light yellow or clear like water. Drink water, fruit juice, or other drinks that do not contain caffeine or alcohol. If you have kidney, heart, or liver disease and have to limit fluids, talk with your doctor before you increase the amount of fluids you drink. ? Take a fiber supplement, such as Citrucel or Metamucil, every day if needed. Read and follow all instructions on the label. If you take pain medicine for more than a few days, talk to your doctor before you take fiber. When should you call for help? Call your doctor now or seek immediate medical care if:    · Your pain gets worse.     · Your pain is not controlled by medicine.    Watch closely for changes in your health, and be sure to contact your doctor if you have any problems. Where can you learn more? Go to http://samuelParentsWarekelsey.info/. Enter [phone] in the search box to learn more about \"Acute Pain After Surgery: Care Instructions. \"  Current as of: November 20, 2017  Content Version: 11.8  © 1581-7424 [employer]. Care instructions adapted under license by [employer] (which disclaims liability or warranty for this information). If you have questions about a medical condition or this instruction, always ask your healthcare professional. [place] [address] any warranty or liability for your use of this information. Surgery: What to Expect at Home  Your Recovery  This care sheet gives you a general idea about how long it will take for you to recover. But each person recovers at a different pace. Follow the steps below to get better as quickly as possible. How can you care for yourself at home?   Activity    · Allow your body to heal. Don't move quickly or lift anything heavy until you are feeling better.     · Rest when you feel tired.     · Your doctor may give you specific instructions on when you can do your normal activities again, such as driving and going back to work.     · Be active. Walking is a good choice. Diet    · You can eat your normal diet when you feel well. If your stomach is upset, try bland, low-fat foods like plain rice, broiled chicken, toast, and yogurt.     · If your bowel movements are not regular right after surgery, try to avoid constipation and straining. Drink plenty of water. Your doctor may suggest fiber, a stool softener, or a mild laxative. Medicines    · Your doctor will tell you if and when you can restart your medicines. He or she will also give you instructions about taking any new medicines.     · If you take blood thinners, such as warfarin (Coumadin), clopidogrel (Plavix), or aspirin, be sure to talk to your doctor. He or she will tell you if and when to start taking those medicines again. Make sure that you understand exactly what your doctor wants you to do.     · Be safe with medicines. Read and follow all instructions on the label. ? If the doctor gave you a prescription medicine for pain, take it as prescribed. ? If you are not taking a prescription pain medicine, ask your doctor if you can take an over-the-counter medicine. Incision care   If your doctor told you how to care for your cut (incision), follow your doctor's instructions. If you did not get instructions, follow this general advice:    · You will have a dressing over the cut. A dressing helps the incision heal and protects it. Your doctor will tell you how to take care of this.     · If you have strips of tape on the cut the doctor made, leave the tape on for a week or until it falls off.     · If you had stitches or staples, your doctor will tell you when to come back to have them removed.     · If you have skin adhesive on the cut, leave it on until it falls off. Skin adhesive is also called liquid stitches.     · Change the bandage every day.     · Wash the area daily with warm water, and pat it dry.  Don't use hydrogen peroxide or alcohol. They can slow healing.     · You may cover the area with a gauze bandage if it oozes fluid or rubs against clothing.     · You may shower 24 to 48 hours after surgery. Pat the incision dry. Don't swim or take a bath for the first 2 weeks, or until your doctor tells you it is okay. Follow-up care is a key part of your treatment and safety. Be sure to make and go to all appointments, and call your doctor if you are having problems. It's also a good idea to know your test results and keep a list of the medicines you take. When should you call for help? Call 911 anytime you think you may need emergency care. For example, call if:    · You passed out (lost consciousness).     · You are short of breath.    Call your doctor now or seek immediate medical care if:    · You have pain that does not get better after you take pain medicine.     · You cannot pass stool or gas.     · You are sick to your stomach and cannot drink fluids.     · You have loose stitches, or your incision comes open.     · You have signs of a blood clot in your leg (called a deep vein thrombosis), such as:  ? Pain in your calf, back of the knee, thigh, or groin. ? Redness and swelling in your leg or groin.     · You have signs of infection, such as:  ? Increased pain, swelling, warmth, or redness. ? Red streaks leading from the incision. ? Pus draining from the incision. ? A fever.     · Bright red blood has soaked through your bandage.    Watch closely for any changes in your health, and be sure to contact your doctor if you have any problems. Where can you learn more? Go to http://samuel-kelsey.info/. Enter A295 in the search box to learn more about \"Surgery: What to Expect at Home. \"  Current as of: March 29, 2018  Content Version: 11.8  © 6504-3029 [employer].  Care instructions adapted under license by [employer] (which disclaims liability or warranty for this information). If you have questions about a medical condition or this instruction, always ask your healthcare professional. [place] [address] any warranty or liability for your use of this information.

## 2019-01-29 DIAGNOSIS — I10 ESSENTIAL HYPERTENSION: ICD-10-CM

## 2019-01-29 RX ORDER — HYDROCHLOROTHIAZIDE 12.5 MG/1
TABLET ORAL
Qty: 30 TAB | Refills: 5 | Status: SHIPPED | OUTPATIENT
Start: 2019-01-29 | End: 2019-08-08 | Stop reason: SDUPTHER

## 2019-01-29 RX ORDER — LISINOPRIL 40 MG/1
TABLET ORAL
Qty: 30 TAB | Refills: 5 | Status: SHIPPED | OUTPATIENT
Start: 2019-01-29 | End: 2019-05-15 | Stop reason: ALTCHOICE

## 2019-02-13 ENCOUNTER — OFFICE VISIT (OUTPATIENT)
Dept: FAMILY MEDICINE CLINIC | Age: 49
End: 2019-02-13

## 2019-02-13 VITALS
HEIGHT: 66 IN | WEIGHT: 212.8 LBS | BODY MASS INDEX: 34.2 KG/M2 | HEART RATE: 66 BPM | DIASTOLIC BLOOD PRESSURE: 90 MMHG | RESPIRATION RATE: 18 BRPM | TEMPERATURE: 98.2 F | SYSTOLIC BLOOD PRESSURE: 158 MMHG

## 2019-02-13 DIAGNOSIS — E11.9 TYPE 2 DIABETES MELLITUS WITHOUT COMPLICATION, WITHOUT LONG-TERM CURRENT USE OF INSULIN (HCC): Primary | ICD-10-CM

## 2019-02-13 DIAGNOSIS — E11.9 TYPE 2 DIABETES MELLITUS WITHOUT COMPLICATION, WITHOUT LONG-TERM CURRENT USE OF INSULIN (HCC): ICD-10-CM

## 2019-02-13 DIAGNOSIS — E78.2 MIXED HYPERLIPIDEMIA: ICD-10-CM

## 2019-02-13 DIAGNOSIS — I10 ESSENTIAL HYPERTENSION: ICD-10-CM

## 2019-02-13 NOTE — PROGRESS NOTES
Chief Complaint Patient presents with  Diabetes  
  follow up, fasting  Hypertension  Labs  
  completed 11/21/18 HPI Charlotte Mahoney is a 51 y.o. female presenting today for 3 months follow up of dm, htn. Patient had labs on 11/21/18. Labs reviewed in detail with patient Patient does need medication refills today. New concerns today: pt feels that her bp has not been well controlled since  the components of her bp med. Home bp readings have been elevated. She has just resumed her exercise program.  She had had to stop after her recent surgery. ROS Review of Systems Constitutional: Negative. HENT: Negative. Respiratory: Negative. Cardiovascular: Negative. All other systems reviewed and are negative. Physical Exam 
Physical Exam  
Nursing note and vitals reviewed. Constitutional: She is oriented to person, place, and time. She appears well-developed and well-nourished. HENT:  
Head: Normocephalic and atraumatic. Right Ear: External ear normal.  
Left Ear: External ear normal.  
Nose: Nose normal.  
Eyes: Conjunctivae and EOM are normal.  
Neck: Normal range of motion. Neck supple. No JVD present. Carotid bruit is not present. No thyromegaly present. Cardiovascular: Normal rate, regular rhythm, normal heart sounds and intact distal pulses. Exam reveals no gallop and no friction rub. No murmur heard. Pulmonary/Chest: Effort normal and breath sounds normal. She has no wheezes. She has no rhonchi. She has no rales. Abdominal: Soft. Bowel sounds are normal.  
Musculoskeletal: Normal range of motion. Neurological: She is alert and oriented to person, place, and time. Coordination normal.  
Skin: Skin is warm and dry. Psychiatric: She has a normal mood and affect.  Her behavior is normal. Judgment and thought content normal.  
 
Diagnoses and all orders for this visit: 
 
 1. Type 2 diabetes mellitus without complication, without long-term current use of insulin ([facility] [address] -     METABOLIC PANEL, COMPREHENSIVE; Future -     LIPID PANEL; Future 
-     HEMOGLOBIN A1C WITH EAG; Future -     MICROALBUMIN, UR, RAND W/ MICROALB/CREAT RATIO; Future 2. Essential hypertension -     METABOLIC PANEL, COMPREHENSIVE; Future -     LIPID PANEL; Future Elevated today. Cont close monitoring. Resume exercise. 3. Mixed hyperlipidemia -     METABOLIC PANEL, COMPREHENSIVE; Future -     LIPID PANEL; Future Follow-up Disposition: 
Return in about 3 months (around 5/13/2019) for diabetes, high blood pressure, high cholesterol.

## 2019-02-13 NOTE — PATIENT INSTRUCTIONS
High Blood Pressure: Care Instructions Overview It's normal for blood pressure to go up and down throughout the day. But if it stays up, you have high blood pressure. Another name for high blood pressure is hypertension. Despite what a lot of people think, high blood pressure usually doesn't cause headaches or make you feel dizzy or lightheaded. It usually has no symptoms. But it does increase your risk of stroke, heart attack, and other problems. You and your doctor will talk about your risks of these problems based on your blood pressure. Your doctor will give you a goal for your blood pressure. Your goal will be based on your health and your age. Lifestyle changes, such as eating healthy and being active, are always important to help lower blood pressure. You might also take medicine to reach your blood pressure goal. 
Follow-up care is a key part of your treatment and safety. Be sure to make and go to all appointments, and call your doctor if you are having problems. It's also a good idea to know your test results and keep a list of the medicines you take. How can you care for yourself at home? Medical treatment · If you stop taking your medicine, your blood pressure will go back up. You may take one or more types of medicine to lower your blood pressure. Be safe with medicines. Take your medicine exactly as prescribed. Call your doctor if you think you are having a problem with your medicine. · Talk to your doctor before you start taking aspirin every day. Aspirin can help certain people lower their risk of a heart attack or stroke. But taking aspirin isn't right for everyone, because it can cause serious bleeding. · See your doctor regularly. You may need to see the doctor more often at first or until your blood pressure comes down. · If you are taking blood pressure medicine, talk to your doctor before you take decongestants or anti-inflammatory medicine, such as ibuprofen. Some of these medicines can raise blood pressure. · Learn how to check your blood pressure at home. Lifestyle changes · Stay at a healthy weight. This is especially important if you put on weight around the waist. Losing even 10 pounds can help you lower your blood pressure. · If your doctor recommends it, get more exercise. Walking is a good choice. Bit by bit, increase the amount you walk every day. Try for at least 30 minutes on most days of the week. You also may want to swim, bike, or do other activities. · Avoid or limit alcohol. Talk to your doctor about whether you can drink any alcohol. · Try to limit how much sodium you eat to less than 2,300 milligrams (mg) a day. Your doctor may ask you to try to eat less than 1,500 mg a day. · Eat plenty of fruits (such as bananas and oranges), vegetables, legumes, whole grains, and low-fat dairy products. · Lower the amount of saturated fat in your diet. Saturated fat is found in animal products such as milk, cheese, and meat. Limiting these foods may help you lose weight and also lower your risk for heart disease. · Do not smoke. Smoking increases your risk for heart attack and stroke. If you need help quitting, talk to your doctor about stop-smoking programs and medicines. These can increase your chances of quitting for good. When should you call for help? Call 911 anytime you think you may need emergency care. This may mean having symptoms that suggest that your blood pressure is causing a serious heart or blood vessel problem. Your blood pressure may be over 180/120. 
 For example, call 911 if: 
  · You have symptoms of a heart attack. These may include: 
? Chest pain or pressure, or a strange feeling in the chest. 
? Sweating. ? Shortness of breath. ? Nausea or vomiting. ? Pain, pressure, or a strange feeling in the back, neck, jaw, or upper belly or in one or both shoulders or arms. ? Lightheadedness or sudden weakness. ? A fast or irregular heartbeat.  
  · You have symptoms of a stroke. These may include: 
? Sudden numbness, tingling, weakness, or loss of movement in your face, arm, or leg, especially on only one side of your body. ? Sudden vision changes. ? Sudden trouble speaking. ? Sudden confusion or trouble understanding simple statements. ? Sudden problems with walking or balance. ? A sudden, severe headache that is different from past headaches.  
  · You have severe back or belly pain.  
 Do not wait until your blood pressure comes down on its own. Get help right away. 
 Call your doctor now or seek immediate care if: 
  · Your blood pressure is much higher than normal (such as 180/120 or higher), but you don't have symptoms.  
  · You think high blood pressure is causing symptoms, such as: 
? Severe headache. 
? Blurry vision.  
 Watch closely for changes in your health, and be sure to contact your doctor if: 
  · Your blood pressure measures higher than your doctor recommends at least 2 times. That means the top number is higher or the bottom number is higher, or both.  
  · You think you may be having side effects from your blood pressure medicine. Where can you learn more? Go to http://samuel-kelsey.info/. Enter Z499 in the search box to learn more about \"High Blood Pressure: Care Instructions. \" Current as of: July 22, 2018 Content Version: 11.9 © 3073-3149 [employer]. Care instructions adapted under license by [employer] (which disclaims liability or warranty for this information). If you have questions about a medical condition or this instruction, always ask your healthcare professional. [place] [address] any warranty or liability for your use of this information.

## 2019-02-13 NOTE — PROGRESS NOTES
Emmy Panchal presents today for Chief Complaint Patient presents with  Diabetes  
  follow up, fasting  Hypertension  Labs  
  completed 11/21/18 Emmy Panchal preferred language for health care discussion is english/other. Is someone accompanying this pt? no 
 
Is the patient using any DME equipment during [address]? no 
 
Depression Screening: 
3 most recent PHQ Screens 2/13/2019 PHQ Not Done - Little interest or pleasure in doing things Not at all Feeling down, depressed, irritable, or hopeless Not at all Total Score PHQ 2 0 Trouble falling or staying asleep, or sleeping too much - Feeling tired or having little energy - Poor appetite, weight loss, or overeating - Feeling bad about yourself - or that you are a failure or have let yourself or your family down - Trouble concentrating on things such as school, work, reading, or watching TV - Moving or speaking so slowly that other people could have noticed; or the opposite being so fidgety that others notice - Thoughts of being better off dead, or hurting yourself in some way -  
PHQ 9 Score - How difficult have these problems made it for you to do your work, take care of your home and get along with others - Learning Assessment: 
Learning Assessment 2/13/2019 PRIMARY LEARNER Patient HIGHEST LEVEL OF EDUCATION - PRIMARY LEARNER  > 4 YEARS OF COLLEGE  
BARRIERS PRIMARY LEARNER NONE  
CO-LEARNER CAREGIVER No  
PRIMARY LANGUAGE ENGLISH  
LEARNER PREFERENCE PRIMARY LISTENING  
ANSWERED BY self RELATIONSHIP SELF Abuse Screening: 
Abuse Screening Questionnaire 2/13/2019 Do you ever feel afraid of your partner? Waneta Anam Are you in a relationship with someone who physically or mentally threatens you? Waneta Anam Is it safe for you to go home? Andrew Chacon Coordination of Care: 1. Have you been to the ER, urgent care clinic since your last visit? Hospitalized since your last visit? No 
 
 2. Have you seen or consulted any other health care providers outside of the [address] [place] [address] since your last visit? Include any pap smears or colon screening. No 
 
Per-Dr. Dailey Other ok medication not taking to be deleted. Advance Directive: 1. Do you have an advance directive in place? Patient Reply:no 2. If not, would you like material regarding how to put one in place?  Patient Reply: no

## 2019-02-14 LAB
A-G RATIO,AGRAT: 1.3 RATIO (ref 1.1–2.6)
ALBUMIN SERPL-MCNC: 3.9 G/DL (ref 3.5–5)
ALP SERPL-CCNC: 131 U/L (ref 25–115)
ALT SERPL-CCNC: 15 U/L (ref 5–40)
ANION GAP SERPL CALC-SCNC: 15 MMOL/L
AST SERPL W P-5'-P-CCNC: 11 U/L (ref 10–37)
AVG GLU, 10930: 261 MG/DL (ref 91–123)
BILIRUB SERPL-MCNC: 0.1 MG/DL (ref 0.2–1.2)
BUN SERPL-MCNC: 16 MG/DL (ref 6–22)
CALCIUM SERPL-MCNC: 9.3 MG/DL (ref 8.4–10.5)
CHLORIDE SERPL-SCNC: 99 MMOL/L (ref 98–110)
CHOLEST SERPL-MCNC: 195 MG/DL (ref 110–200)
CO2 SERPL-SCNC: 26 MMOL/L (ref 20–32)
CREAT SERPL-MCNC: 1 MG/DL (ref 0.5–1.2)
CREATININE, URINE: 89 MG/DL
GFRAA, 66117: >60
GFRNA, 66118: 56.3
GLOBULIN,GLOB: 3.1 G/DL (ref 2–4)
GLUCOSE SERPL-MCNC: 158 MG/DL (ref 70–99)
HBA1C MFR BLD HPLC: 10.7 % (ref 4.8–5.9)
HDLC SERPL-MCNC: 38 MG/DL (ref 40–59)
HDLC SERPL-MCNC: 5.1 MG/DL (ref 0–5)
LDLC SERPL CALC-MCNC: 121 MG/DL (ref 50–99)
MICROALB/CREAT RATIO, 140286: NORMAL MCG/MG OF CREATININE (ref 0–30)
MICROALBUMIN,URINE RANDOM 140054: <12 MCG/ML (ref 0.1–17)
POTASSIUM SERPL-SCNC: 4.3 MMOL/L (ref 3.5–5.5)
PROT SERPL-MCNC: 7 G/DL (ref 6.4–8.3)
SODIUM SERPL-SCNC: 140 MMOL/L (ref 133–145)
TRIGL SERPL-MCNC: 183 MG/DL (ref 40–149)
VLDLC SERPL CALC-MCNC: 37 MG/DL (ref 8–30)

## 2019-05-15 ENCOUNTER — OFFICE VISIT (OUTPATIENT)
Dept: FAMILY MEDICINE CLINIC | Age: 49
End: 2019-05-15

## 2019-05-15 ENCOUNTER — HOSPITAL ENCOUNTER (OUTPATIENT)
Dept: LAB | Age: 49
Discharge: HOME | End: 2019-05-15

## 2019-05-15 VITALS
HEIGHT: 66 IN | SYSTOLIC BLOOD PRESSURE: 184 MMHG | HEART RATE: 66 BPM | RESPIRATION RATE: 20 BRPM | TEMPERATURE: 98.3 F | DIASTOLIC BLOOD PRESSURE: 88 MMHG | WEIGHT: 214.4 LBS | BODY MASS INDEX: 34.46 KG/M2

## 2019-05-15 DIAGNOSIS — E78.2 MIXED HYPERLIPIDEMIA: ICD-10-CM

## 2019-05-15 DIAGNOSIS — I10 ESSENTIAL HYPERTENSION: ICD-10-CM

## 2019-05-15 DIAGNOSIS — E11.9 TYPE 2 DIABETES MELLITUS WITHOUT COMPLICATION, WITHOUT LONG-TERM CURRENT USE OF INSULIN (HCC): Primary | ICD-10-CM

## 2019-05-15 DIAGNOSIS — E11.9 TYPE 2 DIABETES MELLITUS WITHOUT COMPLICATION, WITHOUT LONG-TERM CURRENT USE OF INSULIN (HCC): ICD-10-CM

## 2019-05-15 LAB — SENTARA SPECIMEN COL,SENBCF: NORMAL

## 2019-05-15 PROCEDURE — 99001 SPECIMEN HANDLING PT-LAB: CPT

## 2019-05-15 RX ORDER — ALISKIREN 150 MG/1
150 TABLET, FILM COATED ORAL DAILY
Qty: 30 TAB | Refills: 5 | Status: SHIPPED | OUTPATIENT
Start: 2019-05-15 | End: 2019-05-16 | Stop reason: SDUPTHER

## 2019-05-15 NOTE — PROGRESS NOTES
Kate Abdullahi presents today for   Chief Complaint   Patient presents with    Diabetes    Hypertension    Cholesterol Problem     high chol    Results     discuss lab results       Kate Bradly preferred language for health care discussion is english/other. Is someone accompanying this pt? no    Is the patient using any DME equipment during [address]? no    Depression Screening:  3 most recent PHQ Screens 2/13/2019   PHQ Not Done -   Little interest or pleasure in doing things Not at all   Feeling down, depressed, irritable, or hopeless Not at all   Total Score PHQ 2 0   Trouble falling or staying asleep, or sleeping too much -   Feeling tired or having little energy -   Poor appetite, weight loss, or overeating -   Feeling bad about yourself - or that you are a failure or have let yourself or your family down -   Trouble concentrating on things such as school, work, reading, or watching TV -   Moving or speaking so slowly that other people could have noticed; or the opposite being so fidgety that others notice -   Thoughts of being better off dead, or hurting yourself in some way -   PHQ 9 Score -   How difficult have these problems made it for you to do your work, take care of your home and get along with others -       Learning Assessment:  Learning Assessment 2/13/2019   PRIMARY LEARNER Patient   HIGHEST LEVEL OF EDUCATION - PRIMARY LEARNER  > 4 YEARS OF COLLEGE   BARRIERS PRIMARY LEARNER NONE   CO-LEARNER CAREGIVER No   PRIMARY LANGUAGE ENGLISH   LEARNER PREFERENCE PRIMARY LISTENING   ANSWERED BY self   RELATIONSHIP SELF       Abuse Screening:  Abuse Screening Questionnaire 2/13/2019   Do you ever feel afraid of your partner? N   Are you in a relationship with someone who physically or mentally threatens you? N   Is it safe for you to go home?  Y       Health Maintenance Due   Topic Date Due    Pneumococcal 0-64 years (1 of 1 - PPSV23) 11/15/1975    FOOT EXAM Q1  11/15/1979    EYE EXAM RETINAL OR DILATED  02/25/2017   . Health Maintenance reviewed and discussed and ordered per Provider. Bethany Francis is updated on all     Coordination of Care:  1. Have you been to the ER, urgent care clinic since your last visit? Hospitalized since your last visit? no    2. Have you seen or consulted any other health care providers outside of the [address] [place] [address] since your last visit? Include any pap smears or colon screening.  Yes, eye dr      Advance care directive discussed 2/13/19

## 2019-05-15 NOTE — PROGRESS NOTES
Chief Complaint   Patient presents with    Diabetes    Hypertension    Cholesterol Problem     high chol    Results     discuss lab results         HPI    Bridget Epps is a 51 y.o. female presenting today for  3 months  follow up of dm, htn, hld. Pt feels that her bs readings are improving. She has been back to following a \"pure vegetarian\" diet since March. Patient had labs on 2/13/19. Labs reviewed in detail with patient     Patient does need medication refills today. New concerns today: pt is concerned about her bp. It has been elevated at home at times but is not lower than 148/80s. ROS  Review of Systems   Constitutional: Negative. HENT: Negative. Respiratory: Negative. Cardiovascular: Negative. All other systems reviewed and are negative. Physical Exam  Physical Exam   Nursing note and vitals reviewed. Constitutional: She is oriented to person, place, and time. She appears well-developed and well-nourished. HENT:   Head: Normocephalic and atraumatic. Right Ear: External ear normal.   Left Ear: External ear normal.   Nose: Nose normal.   Eyes: Conjunctivae and EOM are normal.   Neck: Normal range of motion. Neck supple. No JVD present. Carotid bruit is not present. No thyromegaly present. Cardiovascular: Normal rate, regular rhythm, normal heart sounds and intact distal pulses. Exam reveals no gallop and no friction rub. No murmur heard. Pulmonary/Chest: Effort normal and breath sounds normal. She has no wheezes. She has no rhonchi. She has no rales. Abdominal: Soft. Bowel sounds are normal.   Musculoskeletal: Normal range of motion. Neurological: She is alert and oriented to person, place, and time. Coordination normal.   Skin: Skin is warm and dry. Psychiatric: She has a normal mood and affect. Her behavior is normal. Judgment and thought content normal.     Diagnoses and all orders for this visit:    1.  Type 2 diabetes mellitus without complication, without long-term current use of insulin (HCC)  -     METABOLIC PANEL, COMPREHENSIVE; Future  -     LIPID PANEL; Future  -     HEMOGLOBIN A1C WITH EAG; Future  -     MICROALBUMIN, UR, RAND W/ MICROALB/CREAT RATIO; Future  -     SITagliptin-metFORMIN (JANUMET)  mg per tablet; TAKE 1 TABLET BY MOUTH TWICE DAILY WITH MEALS    2. Essential hypertension  -     METABOLIC PANEL, COMPREHENSIVE; Future  -     LIPID PANEL; Future  -     BSHSI MYCHART BP FLOWSHEET  -     aliskiren (TEKTURNA) 150 mg tablet; Take 1 Tab by mouth daily. 3. Mixed hyperlipidemia  -     METABOLIC PANEL, COMPREHENSIVE; Future  -     LIPID PANEL; Future      Follow-up and Dispositions    · Return in about 3 months (around 8/15/2019) for high blood pressure, high cholesterol, diabetes.

## 2019-05-16 LAB
A-G RATIO,AGRAT: 1.2 RATIO (ref 1.1–2.6)
ALBUMIN SERPL-MCNC: 4.2 G/DL (ref 3.5–5)
ALP SERPL-CCNC: 136 U/L (ref 25–115)
ALT SERPL-CCNC: 20 U/L (ref 5–40)
ANION GAP SERPL CALC-SCNC: 16 MMOL/L
AST SERPL W P-5'-P-CCNC: 15 U/L (ref 10–37)
AVG GLU, 10930: 226 MG/DL (ref 91–123)
BILIRUB SERPL-MCNC: <0.1 MG/DL (ref 0.2–1.2)
BUN SERPL-MCNC: 7 MG/DL (ref 6–22)
CALCIUM SERPL-MCNC: 10.6 MG/DL (ref 8.4–10.5)
CHLORIDE SERPL-SCNC: 101 MMOL/L (ref 98–110)
CHOLEST SERPL-MCNC: 187 MG/DL (ref 110–200)
CO2 SERPL-SCNC: 24 MMOL/L (ref 20–32)
CREAT SERPL-MCNC: 0.8 MG/DL (ref 0.5–1.2)
CREATININE, URINE: 33 MG/DL
GFRAA, 66117: >60
GFRNA, 66118: >60
GLOBULIN,GLOB: 3.4 G/DL (ref 2–4)
GLUCOSE SERPL-MCNC: 153 MG/DL (ref 70–99)
HBA1C MFR BLD HPLC: 9.5 % (ref 4.8–5.9)
HDLC SERPL-MCNC: 4 MG/DL (ref 0–5)
HDLC SERPL-MCNC: 47 MG/DL (ref 40–59)
LDLC SERPL CALC-MCNC: 111 MG/DL (ref 50–99)
MICROALB/CREAT RATIO, 140286: NORMAL MCG/MG OF CREATININE (ref 0–30)
MICROALBUMIN,URINE RANDOM 140054: <12 MCG/ML (ref 0.1–17)
POTASSIUM SERPL-SCNC: 3.7 MMOL/L (ref 3.5–5.5)
PROT SERPL-MCNC: 7.6 G/DL (ref 6.4–8.3)
SODIUM SERPL-SCNC: 141 MMOL/L (ref 133–145)
TRIGL SERPL-MCNC: 144 MG/DL (ref 40–149)
VLDLC SERPL CALC-MCNC: 29 MG/DL (ref 8–30)

## 2019-05-16 RX ORDER — ALISKIREN 150 MG/1
150 TABLET, FILM COATED ORAL DAILY
Qty: 30 TAB | Refills: 5 | Status: SHIPPED | OUTPATIENT
Start: 2019-05-16 | End: 2019-11-01 | Stop reason: SDUPTHER

## 2019-06-12 ENCOUNTER — OFFICE VISIT (OUTPATIENT)
Dept: FAMILY MEDICINE CLINIC | Age: 49
End: 2019-06-12

## 2019-06-12 VITALS
SYSTOLIC BLOOD PRESSURE: 210 MMHG | RESPIRATION RATE: 20 BRPM | HEART RATE: 61 BPM | BODY MASS INDEX: 34.01 KG/M2 | TEMPERATURE: 98.2 F | HEIGHT: 66 IN | WEIGHT: 211.6 LBS | DIASTOLIC BLOOD PRESSURE: 94 MMHG

## 2019-06-12 DIAGNOSIS — I10 WHITE COAT SYNDROME WITH HYPERTENSION: ICD-10-CM

## 2019-06-12 DIAGNOSIS — I10 ESSENTIAL HYPERTENSION: Primary | ICD-10-CM

## 2019-06-12 NOTE — PROGRESS NOTES
Chief Complaint   Patient presents with    Medication Evaluation     patient was started on generic Tekturna last month         HPI    Kana Islas is a 51 y.o. female presenting today for 1 months  follow up of htn. She has started the [place]. Her home bp readings have dropped and are consistently 268G systolic. ROS  Review of Systems   Constitutional: Negative. HENT: Negative. Respiratory: Negative. Cardiovascular: Negative. All other systems reviewed and are negative. Physical Exam  Physical Exam   Nursing note and vitals reviewed. Constitutional: She is oriented to person, place, and time. She appears well-developed and well-nourished. HENT:   Head: Normocephalic and atraumatic. Right Ear: External ear normal.   Left Ear: External ear normal.   Nose: Nose normal.   Eyes: Conjunctivae and EOM are normal.   Neck: Normal range of motion. Neck supple. No JVD present. Carotid bruit is not present. No thyromegaly present. Cardiovascular: Normal rate, regular rhythm, normal heart sounds and intact distal pulses. Exam reveals no gallop and no friction rub. No murmur heard. Pulmonary/Chest: Effort normal and breath sounds normal. She has no wheezes. She has no rhonchi. She has no rales. Abdominal: Soft. Bowel sounds are normal.   Musculoskeletal: Normal range of motion. Neurological: She is alert and oriented to person, place, and time. Coordination normal.   Skin: Skin is warm and dry. Psychiatric: She has a normal mood and affect. Her behavior is normal. Judgment and thought content normal.     Diagnoses and all orders for this visit:    1. Essential hypertension  Improved; controlled per home bp readings. Cont pres tx plan. 2. White coat syndrome with hypertension  As above, home readings are normotensive. Follow-up and Dispositions    · Return in about 4 months (around 10/12/2019) for diabetes, high blood pressure.

## 2019-06-12 NOTE — PROGRESS NOTES
Kate Abdullahi presents today for   Chief Complaint   Patient presents with    Medication Evaluation     patient was started on generic Tekturna last month       Kate Bradly preferred language for health care discussion is english/other. Is someone accompanying this pt? no    Is the patient using any DME equipment during [address]? no    Depression Screening:  3 most recent PHQ Screens 2/13/2019   PHQ Not Done -   Little interest or pleasure in doing things Not at all   Feeling down, depressed, irritable, or hopeless Not at all   Total Score PHQ 2 0   Trouble falling or staying asleep, or sleeping too much -   Feeling tired or having little energy -   Poor appetite, weight loss, or overeating -   Feeling bad about yourself - or that you are a failure or have let yourself or your family down -   Trouble concentrating on things such as school, work, reading, or watching TV -   Moving or speaking so slowly that other people could have noticed; or the opposite being so fidgety that others notice -   Thoughts of being better off dead, or hurting yourself in some way -   PHQ 9 Score -   How difficult have these problems made it for you to do your work, take care of your home and get along with others -       Learning Assessment:  Learning Assessment 2/13/2019   PRIMARY LEARNER Patient   HIGHEST LEVEL OF EDUCATION - PRIMARY LEARNER  > 4 YEARS OF COLLEGE   BARRIERS PRIMARY LEARNER NONE   CO-LEARNER CAREGIVER No   PRIMARY LANGUAGE ENGLISH   LEARNER PREFERENCE PRIMARY LISTENING   ANSWERED BY self   RELATIONSHIP SELF       Abuse Screening:  Abuse Screening Questionnaire 2/13/2019   Do you ever feel afraid of your partner? N   Are you in a relationship with someone who physically or mentally threatens you? N   Is it safe for you to go home? Y       Health Maintenance Due   Topic Date Due    Pneumococcal 0-64 years (1 of 1 - PPSV23) 11/15/1975    FOOT EXAM Q1  11/15/1979   .       Health Maintenance reviewed and discussed and ordered per Provider. Coordination of Care:  1. Have you been to the ER, urgent care clinic since your last visit? Hospitalized since your last visit? no    2. Have you seen or consulted any other health care providers outside of the [address] [place] [address] since your last visit? Include any pap smears or colon screening.  no      Advance care directive discussed 2/13/19

## 2019-08-08 DIAGNOSIS — I10 ESSENTIAL HYPERTENSION: ICD-10-CM

## 2019-08-12 DIAGNOSIS — I10 ESSENTIAL HYPERTENSION: ICD-10-CM

## 2019-08-12 RX ORDER — HYDROCHLOROTHIAZIDE 12.5 MG/1
TABLET ORAL
Qty: 30 TAB | Refills: 5 | Status: SHIPPED | OUTPATIENT
Start: 2019-08-12 | End: 2020-02-08

## 2019-08-12 NOTE — TELEPHONE ENCOUNTER
PCP: Benjy Lacey MD    Last appt: 6/12/2019  Future Appointments   Date Time Provider Jacqui Roberts   10/1/2019  9:30 AM MD Wally Christine Feliberto   10/16/2019  9:00 AM Benjy Lacey MD [facility] None       Requested Prescriptions     Pending Prescriptions Disp Refills    hydroCHLOROthiazide (HYDRODIURIL) 12.5 mg tablet 30 Tab 5         Orders Only on 05/15/2019   Component Date Value Ref Range Status    Glucose 05/15/2019 153* 70 - 99 mg/dL Final    BUN 05/15/2019 7  6 - 22 mg/dL Final    Creatinine 05/15/2019 0.8  0.5 - 1.2 mg/dL Final    Sodium 05/15/2019 141  133 - 145 mmol/L Final    Potassium 05/15/2019 3.7  3.5 - 5.5 mmol/L Final    Chloride 05/15/2019 101  98 - 110 mmol/L Final    CO2 05/15/2019 24  20 - 32 mmol/L Final    AST (SGOT) 05/15/2019 15  10 - 37 U/L Final    ALT (SGPT) 05/15/2019 20  5 - 40 U/L Final    Alk. phosphatase 05/15/2019 136* 25 - 115 U/L Final    Bilirubin, total 05/15/2019 <0.1* 0.2 - 1.2 mg/dL Final    Calcium 05/15/2019 10.6* 8.4 - 10.5 mg/dL Final    Protein, total 05/15/2019 7.6  6.4 - 8.3 g/dL Final    Albumin 05/15/2019 4.2  3.5 - 5.0 g/dL Final    A-G Ratio 05/15/2019 1.2  1.1 - 2.6 ratio Final    Globulin 05/15/2019 3.4  2.0 - 4.0 g/dL Final    Anion gap 05/15/2019 16.0  mmol/L Final    Comment: Test includes Albumin, Alkaline Phosphatase, ALT, AST, BUN, Calcium, CO2,  Chloride, Creatinine, Glucose, Potassium, Sodium, Total Bilirubin and Total  Protein. Estimated GFR results are reported in mL/min/1.73 sq.m. by the MDRD equation. This eGFR is validated for stable chronic renal failure patients. This   equation  is unreliable in acute illness or patients with normal renal function.       GFRAA 05/15/2019 >60.0  >60.0 Final    GFRNA 05/15/2019 >60.0  >60.0 Final    Triglyceride 05/15/2019 144  40 - 149 mg/dL Final    HDL Cholesterol 05/15/2019 47  40 - 59 mg/dL Final    Cholesterol, total 05/15/2019 187  110 - 200 mg/dL Final    CHOLESTEROL/HDL 05/15/2019 4.0  0.0 - 5.0 Final    LDL, calculated 05/15/2019 111* 50 - 99 mg/dL Final    VLDL, calculated 05/15/2019 29  8 - 30 mg/dL Final    Comment: Test includes cholesterol, HDL cholesterol, triglycerides and LDL. Cholesterol Recommended NCEP guidelines in mg/dL:  Less than 200            Desirable  200 - 239                Borderline High  Greater than or  = 240   High  Please Note:  Total Chol/HDL Ratio                   Men     Women  1/2 Avg. Risk    3.4     3.3      Avg. Risk    5.0     4.4  2X  Avg. Risk    9.6     7.1  3X  Avg.  Risk   23.4    11.0      Creatinine, urine 05/15/2019 33  mg/dL Final    Microalbumin, urine 05/15/2019 <12.0  0.1 - 17.0 mcg/mL Final    Microalb/Creat ratio (ug/mg creat.) 05/15/2019   0.0 - 30.0 mcg/mg of Creatinine Final    ** Unable to calculate Microablumin/Creatinine Ratio due to low Microalbumin    Hemoglobin A1c 05/15/2019 9.5* 4.8 - 5.9 % Final    AVG GLU 05/15/2019 226* 91 - 123 mg/dL Final   Hospital Outpatient Visit on 05/15/2019   Component Date Value Ref Range Status    [facility] SPECIMEN COL 05/15/2019 Specimens collected/sent to [facility]    Final

## 2019-08-13 RX ORDER — HYDROCHLOROTHIAZIDE 12.5 MG/1
TABLET ORAL
Qty: 30 TAB | Refills: 5 | OUTPATIENT
Start: 2019-08-13

## 2019-08-20 DIAGNOSIS — N94.6 DYSMENORRHEA: ICD-10-CM

## 2019-08-21 RX ORDER — NAPROXEN 500 MG/1
TABLET ORAL
Qty: 60 TAB | Refills: 5 | Status: SHIPPED | OUTPATIENT
Start: 2019-08-21 | End: 2021-04-21 | Stop reason: SDUPTHER

## 2019-10-16 ENCOUNTER — OFFICE VISIT (OUTPATIENT)
Dept: FAMILY MEDICINE CLINIC | Age: 49
End: 2019-10-16

## 2019-10-16 VITALS
TEMPERATURE: 98.6 F | DIASTOLIC BLOOD PRESSURE: 88 MMHG | SYSTOLIC BLOOD PRESSURE: 166 MMHG | BODY MASS INDEX: 34.3 KG/M2 | WEIGHT: 213.4 LBS | HEART RATE: 65 BPM | HEIGHT: 66 IN | RESPIRATION RATE: 18 BRPM

## 2019-10-16 DIAGNOSIS — I10 WHITE COAT SYNDROME WITH HYPERTENSION: ICD-10-CM

## 2019-10-16 DIAGNOSIS — E11.9 TYPE 2 DIABETES MELLITUS WITHOUT COMPLICATION, WITHOUT LONG-TERM CURRENT USE OF INSULIN (HCC): Primary | ICD-10-CM

## 2019-10-16 DIAGNOSIS — E11.9 TYPE 2 DIABETES MELLITUS WITHOUT COMPLICATION, WITHOUT LONG-TERM CURRENT USE OF INSULIN (HCC): ICD-10-CM

## 2019-10-16 DIAGNOSIS — I10 ESSENTIAL HYPERTENSION: ICD-10-CM

## 2019-10-16 DIAGNOSIS — E78.2 MIXED HYPERLIPIDEMIA: ICD-10-CM

## 2019-10-16 NOTE — PATIENT INSTRUCTIONS
High Blood Pressure: Care Instructions  Overview    It's normal for blood pressure to go up and down throughout the day. But if it stays up, you have high blood pressure. Another name for high blood pressure is hypertension. Despite what a lot of people think, high blood pressure usually doesn't cause headaches or make you feel dizzy or lightheaded. It usually has no symptoms. But it does increase your risk of stroke, heart attack, and other problems. You and your doctor will talk about your risks of these problems based on your blood pressure. Your doctor will give you a goal for your blood pressure. Your goal will be based on your health and your age. Lifestyle changes, such as eating healthy and being active, are always important to help lower blood pressure. You might also take medicine to reach your blood pressure goal.  Follow-up care is a key part of your treatment and safety. Be sure to make and go to all appointments, and call your doctor if you are having problems. It's also a good idea to know your test results and keep a list of the medicines you take. How can you care for yourself at home? Medical treatment  · If you stop taking your medicine, your blood pressure will go back up. You may take one or more types of medicine to lower your blood pressure. Be safe with medicines. Take your medicine exactly as prescribed. Call your doctor if you think you are having a problem with your medicine. · Talk to your doctor before you start taking aspirin every day. Aspirin can help certain people lower their risk of a heart attack or stroke. But taking aspirin isn't right for everyone, because it can cause serious bleeding. · See your doctor regularly. You may need to see the doctor more often at first or until your blood pressure comes down. · If you are taking blood pressure medicine, talk to your doctor before you take decongestants or anti-inflammatory medicine, such as ibuprofen.  Some of these medicines can raise blood pressure. · Learn how to check your blood pressure at home. Lifestyle changes  · Stay at a healthy weight. This is especially important if you put on weight around the waist. Losing even 10 pounds can help you lower your blood pressure. · If your doctor recommends it, get more exercise. Walking is a good choice. Bit by bit, increase the amount you walk every day. Try for at least 30 minutes on most days of the week. You also may want to swim, bike, or do other activities. · Avoid or limit alcohol. Talk to your doctor about whether you can drink any alcohol. · Try to limit how much sodium you eat to less than 2,300 milligrams (mg) a day. Your doctor may ask you to try to eat less than 1,500 mg a day. · Eat plenty of fruits (such as bananas and oranges), vegetables, legumes, whole grains, and low-fat dairy products. · Lower the amount of saturated fat in your diet. Saturated fat is found in animal products such as milk, cheese, and meat. Limiting these foods may help you lose weight and also lower your risk for heart disease. · Do not smoke. Smoking increases your risk for heart attack and stroke. If you need help quitting, talk to your doctor about stop-smoking programs and medicines. These can increase your chances of quitting for good. When should you call for help? Call  911 anytime you think you may need emergency care. This may mean having symptoms that suggest that your blood pressure is causing a serious heart or blood vessel problem. Your blood pressure may be over 180/120.   For example, call  911 if:    · You have symptoms of a heart attack. These may include:  ? Chest pain or pressure, or a strange feeling in the chest.  ? Sweating. ? Shortness of breath. ? Nausea or vomiting. ? Pain, pressure, or a strange feeling in the back, neck, jaw, or upper belly or in one or both shoulders or arms. ? Lightheadedness or sudden weakness.   ? A fast or irregular heartbeat.     · You have symptoms of a stroke. These may include:  ? Sudden numbness, tingling, weakness, or loss of movement in your face, arm, or leg, especially on only one side of your body. ? Sudden vision changes. ? Sudden trouble speaking. ? Sudden confusion or trouble understanding simple statements. ? Sudden problems with walking or balance. ? A sudden, severe headache that is different from past headaches.     · You have severe back or belly pain.    Do not wait until your blood pressure comes down on its own. Get help right away.   Call your doctor now or seek immediate care if:    · Your blood pressure is much higher than normal (such as 180/120 or higher), but you don't have symptoms.     · You think high blood pressure is causing symptoms, such as:  ? Severe headache.  ? Blurry vision.    Watch closely for changes in your health, and be sure to contact your doctor if:    · Your blood pressure measures higher than your doctor recommends at least 2 times. That means the top number is higher or the bottom number is higher, or both.     · You think you may be having side effects from your blood pressure medicine. Where can you learn more? Go to http://samuel-kelsey.info/. Enter J242 in the search box to learn more about \"High Blood Pressure: Care Instructions. \"  Current as of: April 9, 2019  Content Version: 12.2  © 1913-2242 [employer]. Care instructions adapted under license by [employer] (which disclaims liability or warranty for this information). If you have questions about a medical condition or this instruction, always ask your healthcare professional. [place] [address] any warranty or liability for your use of this information.

## 2019-10-16 NOTE — PROGRESS NOTES
Naa Senegal presents today for   Chief Complaint   Patient presents with    Diabetes     follow up    Hypertension       Naa Figueroaegal preferred language for health care discussion is english/other. Is someone accompanying this pt? no    Is the patient using any DME equipment during [address]? no    Depression Screening:  3 most recent PHQ Screens 2/13/2019   PHQ Not Done -   Little interest or pleasure in doing things Not at all   Feeling down, depressed, irritable, or hopeless Not at all   Total Score PHQ 2 0   Trouble falling or staying asleep, or sleeping too much -   Feeling tired or having little energy -   Poor appetite, weight loss, or overeating -   Feeling bad about yourself - or that you are a failure or have let yourself or your family down -   Trouble concentrating on things such as school, work, reading, or watching TV -   Moving or speaking so slowly that other people could have noticed; or the opposite being so fidgety that others notice -   Thoughts of being better off dead, or hurting yourself in some way -   PHQ 9 Score -   How difficult have these problems made it for you to do your work, take care of your home and get along with others -       Learning Assessment:  Learning Assessment 2/13/2019   PRIMARY LEARNER Patient   HIGHEST LEVEL OF EDUCATION - PRIMARY LEARNER  > 4 YEARS OF COLLEGE   BARRIERS PRIMARY LEARNER NONE   CO-LEARNER CAREGIVER No   PRIMARY LANGUAGE ENGLISH   LEARNER PREFERENCE PRIMARY LISTENING   ANSWERED BY self   RELATIONSHIP SELF       Abuse Screening:  Abuse Screening Questionnaire 2/13/2019   Do you ever feel afraid of your partner? N   Are you in a relationship with someone who physically or mentally threatens you? N   Is it safe for you to go home? Y       Generalized Anxiety  No flowsheet data found.       Health Maintenance Due   Topic Date Due    Pneumococcal 0-64 years (1 of 1 - PPSV23) 11/15/1975    FOOT EXAM Q1  11/15/1979    Influenza Age 5 to Adult 08/01/2019    PAP AKA CERVICAL CYTOLOGY  10/16/2019    HEMOGLOBIN A1C Q6M  11/15/2019   . Health Maintenance reviewed and discussed and ordered per Provider. Andre Hunter is updated on all     Coordination of Care:  1. Have you been to the ER, urgent care clinic since your last visit? Hospitalized since your last visit? no    2. Have you seen or consulted any other health care providers outside of the [address] [place] [address] since your last visit? Include any pap smears or colon screening. no      Advance Directive:  1. Do you have an advance directive in place? Patient Reply:no    2. If not, would you like material regarding how to put one in place?  Patient Reply: no

## 2019-11-06 DIAGNOSIS — J30.1 SEASONAL ALLERGIC RHINITIS DUE TO POLLEN: ICD-10-CM

## 2019-11-08 RX ORDER — FLUTICASONE PROPIONATE 50 MCG
SPRAY, SUSPENSION (ML) NASAL
Qty: 1 BOTTLE | Refills: 5 | Status: SHIPPED | OUTPATIENT
Start: 2019-11-08 | End: 2021-01-06

## 2020-01-07 ENCOUNTER — HOSPITAL ENCOUNTER (OUTPATIENT)
Dept: MAMMOGRAPHY | Age: 50
Discharge: HOME | End: 2020-01-07
Attending: OBSTETRICS & GYNECOLOGY
Payer: COMMERCIAL

## 2020-01-07 DIAGNOSIS — Z12.31 VISIT FOR SCREENING MAMMOGRAM: ICD-10-CM

## 2020-01-07 PROCEDURE — 77067 SCR MAMMO BI INCL CAD: CPT

## 2020-02-05 ENCOUNTER — HOSPITAL ENCOUNTER (OUTPATIENT)
Dept: LAB | Age: 50
Discharge: HOME | End: 2020-02-05

## 2020-02-05 LAB — SENTARA SPECIMEN COL,SENBCF: NORMAL

## 2020-02-05 PROCEDURE — 99001 SPECIMEN HANDLING PT-LAB: CPT

## 2020-02-06 LAB
A-G RATIO,AGRAT: 1.4 RATIO (ref 1.1–2.6)
ALBUMIN SERPL-MCNC: 4.3 G/DL (ref 3.5–5)
ALP SERPL-CCNC: 147 U/L (ref 25–115)
ALT SERPL-CCNC: 36 U/L (ref 5–40)
ANION GAP SERPL CALC-SCNC: 12 MMOL/L
AST SERPL W P-5'-P-CCNC: 24 U/L (ref 10–37)
AVG GLU, 10930: 299 MG/DL (ref 91–123)
BILIRUB SERPL-MCNC: 0.2 MG/DL (ref 0.2–1.2)
BUN SERPL-MCNC: 7 MG/DL (ref 6–22)
CALCIUM SERPL-MCNC: 9.9 MG/DL (ref 8.4–10.5)
CHLORIDE SERPL-SCNC: 98 MMOL/L (ref 98–110)
CHOLEST SERPL-MCNC: 201 MG/DL (ref 110–200)
CO2 SERPL-SCNC: 27 MMOL/L (ref 20–32)
CREAT SERPL-MCNC: 0.9 MG/DL (ref 0.5–1.2)
CREATININE, URINE: 45 MG/DL
GFRAA, 66117: >60
GFRNA, 66118: >60
GLOBULIN,GLOB: 3 G/DL (ref 2–4)
GLUCOSE SERPL-MCNC: 216 MG/DL (ref 70–99)
HBA1C MFR BLD HPLC: 12.1 % (ref 4.8–5.6)
HDLC SERPL-MCNC: 4.4 MG/DL (ref 0–5)
HDLC SERPL-MCNC: 46 MG/DL
LDL/HDL RATIO,LDHD: 2.7
LDLC SERPL CALC-MCNC: 127 MG/DL (ref 50–99)
MICROALB/CREAT RATIO, 140286: 92.7 (ref 0–30)
MICROALBUMIN,URINE RANDOM 140054: 41.7 MG/L (ref 0.1–17)
NON-HDL CHOLESTEROL, 011976: 155 MG/DL
POTASSIUM SERPL-SCNC: 4.3 MMOL/L (ref 3.5–5.5)
PROT SERPL-MCNC: 7.3 G/DL (ref 6.4–8.3)
SODIUM SERPL-SCNC: 137 MMOL/L (ref 133–145)
TRIGL SERPL-MCNC: 141 MG/DL (ref 40–149)
VLDLC SERPL CALC-MCNC: 28 MG/DL (ref 8–30)

## 2020-02-12 ENCOUNTER — OFFICE VISIT (OUTPATIENT)
Dept: FAMILY MEDICINE CLINIC | Age: 50
End: 2020-02-12

## 2020-02-12 VITALS
TEMPERATURE: 98.1 F | SYSTOLIC BLOOD PRESSURE: 183 MMHG | BODY MASS INDEX: 34.62 KG/M2 | DIASTOLIC BLOOD PRESSURE: 97 MMHG | RESPIRATION RATE: 18 BRPM | HEIGHT: 66 IN | HEART RATE: 67 BPM | WEIGHT: 215.4 LBS

## 2020-02-12 DIAGNOSIS — I10 WHITE COAT SYNDROME WITH HYPERTENSION: ICD-10-CM

## 2020-02-12 DIAGNOSIS — Z29.9 PREVENTIVE MEASURE: ICD-10-CM

## 2020-02-12 DIAGNOSIS — L30.9 DERMATITIS: ICD-10-CM

## 2020-02-12 DIAGNOSIS — E11.9 TYPE 2 DIABETES MELLITUS WITHOUT COMPLICATION, WITHOUT LONG-TERM CURRENT USE OF INSULIN (HCC): ICD-10-CM

## 2020-02-12 DIAGNOSIS — I10 ESSENTIAL HYPERTENSION: ICD-10-CM

## 2020-02-12 DIAGNOSIS — R42 VERTIGO: ICD-10-CM

## 2020-02-12 DIAGNOSIS — Z12.11 SCREEN FOR COLON CANCER: ICD-10-CM

## 2020-02-12 DIAGNOSIS — E78.2 MIXED HYPERLIPIDEMIA: ICD-10-CM

## 2020-02-12 DIAGNOSIS — E11.9 TYPE 2 DIABETES MELLITUS WITHOUT COMPLICATION, WITHOUT LONG-TERM CURRENT USE OF INSULIN (HCC): Primary | ICD-10-CM

## 2020-02-12 RX ORDER — MECLIZINE HCL 12.5 MG 12.5 MG/1
12.5 TABLET ORAL
Qty: 30 TAB | Refills: 1 | Status: SHIPPED | OUTPATIENT
Start: 2020-02-12 | End: 2020-06-22 | Stop reason: SDUPTHER

## 2020-02-12 RX ORDER — TRIAMCINOLONE ACETONIDE 1 MG/G
CREAM TOPICAL
Qty: 80 G | Refills: 3 | Status: SHIPPED | OUTPATIENT
Start: 2020-02-12 | End: 2021-08-02 | Stop reason: SDUPTHER

## 2020-02-12 RX ORDER — ALISKIREN 300 MG/1
300 TABLET, FILM COATED ORAL DAILY
Qty: 30 TAB | Refills: 5 | Status: SHIPPED | OUTPATIENT
Start: 2020-02-12 | End: 2020-02-13 | Stop reason: SDUPTHER

## 2020-02-12 RX ORDER — FLUCONAZOLE 150 MG/1
150 TABLET ORAL DAILY
Qty: 1 TAB | Refills: 2 | Status: SHIPPED | OUTPATIENT
Start: 2020-02-12 | End: 2020-04-27

## 2020-02-12 NOTE — PROGRESS NOTES
Chief Complaint   Patient presents with    Diabetes     follow up    Hypertension    Cholesterol Problem    Labs     completed 2/5/20    Medication Refill         HPI    Bethany Francis is a 47 y.o. female presenting today for 4 months  follow up of dm, htn, hld. Home sugars have been higher. Pt feels that her sugers have just been harder to control for several months now. Pt notes that her bp readings are often 493J systolic, 06-58F diastolic at home. Patient had labs on 2/5/2020. Labs reviewed in detail with patient     Patient does need medication refills today. Pt needs referral for colo. Pt will see podiatry for a dm foot exam this month. New concerns today: pt notes that she had to leave work due to onset of vertigo last week. That episode lasted about 4 days. She would like have a refill of meclizine to have on hand. Her sinuses were bothering her then but they have improved. Review of Systems   Constitutional: Negative. HENT: Negative. Respiratory: Negative. Neurological: Positive for dizziness. All other systems reviewed and are negative. Physical Exam  Physical Exam   Nursing note and vitals reviewed. Constitutional: She is oriented to person, place, and time. She appears well-developed and well-nourished. HENT:   Head: Normocephalic and atraumatic. Right Ear: External ear normal.   Left Ear: External ear normal.   Nose: Nose normal.   Eyes: Conjunctivae and EOM are normal.   Neck: Normal range of motion. Neck supple. No JVD present. Carotid bruit is not present. No thyromegaly present. Cardiovascular: Normal rate, regular rhythm, normal heart sounds and intact distal pulses. Exam reveals no gallop and no friction rub. No murmur heard. Pulmonary/Chest: Effort normal and breath sounds normal. She has no wheezes. She has no rhonchi. She has no rales. Abdominal: Soft. Bowel sounds are normal.   Musculoskeletal: Normal range of motion. Neurological: She is alert and oriented to person, place, and time. Coordination normal.   Skin: Skin is warm and dry. Psychiatric: She has a normal mood and affect. Her behavior is normal. Judgment and thought content normal.     Diagnoses and all orders for this visit:    1. Type 2 diabetes mellitus without complication, without long-term current use of insulin ([facility])  -    Trial:  empagliflozin (JARDIANCE) 10 mg tablet; Take 1 Tab by mouth daily.  -     METABOLIC PANEL, COMPREHENSIVE; Future  -     LIPID PANEL; Future  -     HEMOGLOBIN A1C WITH EAG; Future  -     MICROALBUMIN, UR, RAND W/ MICROALB/CREAT RATIO; Future    2. Vertigo  Resolved at this time. -     meclizine (ANTIVERT) 12.5 mg tablet; Take 1 Tab by mouth three (3) times daily as needed for Dizziness. 3. Dermatitis  -     triamcinolone acetonide (KENALOG) 0.1 % topical cream; APPLY A THIN LAYER TO AFFECTED AREA TWICE DAILY    4. Essential hypertension  -     METABOLIC PANEL, COMPREHENSIVE; Future  -     LIPID PANEL; Future  -    increase aliskiren (TEKTURNA) to 300 mg tablet; Take 1 Tab by mouth daily. Cont home bp monitoring. If bp readings are low, would d/c hctz    5. Mixed hyperlipidemia  -     METABOLIC PANEL, COMPREHENSIVE; Future  -     LIPID PANEL; Future    6. White coat syndrome with hypertension  -     METABOLIC PANEL, COMPREHENSIVE; Future  -     LIPID PANEL; Future    7. Screen for colon cancer  -     REFERRAL TO GASTROENTEROLOGY    8. Preventive measure  -     fluconazole (DIFLUCAN) 150 mg tablet; Take 1 Tab by mouth daily for 1 day. Follow-up and Dispositions    · Return in about 4 weeks (around 3/11/2020) for diabetes, high blood pressure.

## 2020-02-12 NOTE — PROGRESS NOTES
Kana Islas presents today for   Chief Complaint   Patient presents with    Diabetes     follow up    Hypertension    Cholesterol Problem    Labs     completed 2/5/20    Medication Refill       Kana Islas preferred language for health care discussion is english/other. Is someone accompanying this pt? no    Is the patient using any DME equipment during [address]? no    Depression Screening:  3 most recent PHQ Screens 2/12/2020   PHQ Not Done -   Little interest or pleasure in doing things Not at all   Feeling down, depressed, irritable, or hopeless Not at all   Total Score PHQ 2 0   Trouble falling or staying asleep, or sleeping too much -   Feeling tired or having little energy -   Poor appetite, weight loss, or overeating -   Feeling bad about yourself - or that you are a failure or have let yourself or your family down -   Trouble concentrating on things such as school, work, reading, or watching TV -   Moving or speaking so slowly that other people could have noticed; or the opposite being so fidgety that others notice -   Thoughts of being better off dead, or hurting yourself in some way -   PHQ 9 Score -   How difficult have these problems made it for you to do your work, take care of your home and get along with others -       Learning Assessment:  Learning Assessment 2/12/2020   PRIMARY LEARNER Patient   HIGHEST LEVEL OF EDUCATION - PRIMARY LEARNER  > 4 YEARS OF COLLEGE   BARRIERS PRIMARY LEARNER NONE   CO-LEARNER CAREGIVER No   PRIMARY LANGUAGE ENGLISH   LEARNER PREFERENCE PRIMARY LISTENING   ANSWERED BY self   RELATIONSHIP SELF       Abuse Screening:  Abuse Screening Questionnaire 2/12/2020   Do you ever feel afraid of your partner? N   Are you in a relationship with someone who physically or mentally threatens you? N   Is it safe for you to go home? Y       Generalized Anxiety  No flowsheet data found.       Health Maintenance Due   Topic Date Due    Foot Exam Q1  11/15/1979    PAP AKA CERVICAL CYTOLOGY  10/16/2019    Shingrix Vaccine Age 50> (1 of 2) 11/15/2019    FOBT Q1Y Age 50-75  11/15/2019   . Health Maintenance reviewed and discussed and ordered per Provider. Jesús Garcia is updated on all     Coordination of Care:  1. Have you been to the ER, urgent care clinic since your last visit? Hospitalized since your last visit? no    2. Have you seen or consulted any other health care providers outside of the [address] [place] [address] since your last visit? Include any pap smears or colon screening. no      Advance Directive:  1. Do you have an advance directive in place? Patient Reply:no    2. If not, would you like material regarding how to put one in place?  Patient Reply: no

## 2020-02-13 ENCOUNTER — TELEPHONE (OUTPATIENT)
Dept: FAMILY MEDICINE CLINIC | Age: 50
End: 2020-02-13

## 2020-02-13 RX ORDER — ALISKIREN 300 MG/1
300 TABLET, FILM COATED ORAL DAILY
Qty: 30 TAB | Refills: 5 | Status: SHIPPED | OUTPATIENT
Start: 2020-02-13 | End: 2020-08-03

## 2020-03-13 ENCOUNTER — OFFICE VISIT (OUTPATIENT)
Dept: FAMILY MEDICINE CLINIC | Age: 50
End: 2020-03-13

## 2020-03-13 VITALS
DIASTOLIC BLOOD PRESSURE: 82 MMHG | RESPIRATION RATE: 18 BRPM | HEART RATE: 66 BPM | HEIGHT: 66 IN | BODY MASS INDEX: 34.33 KG/M2 | TEMPERATURE: 97.7 F | WEIGHT: 213.6 LBS | SYSTOLIC BLOOD PRESSURE: 143 MMHG

## 2020-03-13 DIAGNOSIS — I10 ESSENTIAL HYPERTENSION: ICD-10-CM

## 2020-03-13 DIAGNOSIS — M65.30 TRIGGER FINGER, UNSPECIFIED FINGER, UNSPECIFIED LATERALITY: ICD-10-CM

## 2020-03-13 DIAGNOSIS — I10 WHITE COAT SYNDROME WITH HYPERTENSION: ICD-10-CM

## 2020-03-13 DIAGNOSIS — E11.9 TYPE 2 DIABETES MELLITUS WITHOUT COMPLICATION, WITHOUT LONG-TERM CURRENT USE OF INSULIN (HCC): Primary | ICD-10-CM

## 2020-03-13 NOTE — PROGRESS NOTES
Yves Polo presents today for   Chief Complaint   Patient presents with    Diabetes     follow up    Hypertension       Yves Polo preferred language for health care discussion is english/other. Is someone accompanying this pt? no    Is the patient using any DME equipment during [address]? no    Depression Screening:  3 most recent PHQ Screens 3/13/2020   PHQ Not Done -   Little interest or pleasure in doing things Not at all   Feeling down, depressed, irritable, or hopeless Not at all   Total Score PHQ 2 0   Trouble falling or staying asleep, or sleeping too much Not at all   Feeling tired or having little energy Not at all   Poor appetite, weight loss, or overeating Not at all   Feeling bad about yourself - or that you are a failure or have let yourself or your family down Not at all   Trouble concentrating on things such as school, work, reading, or watching TV Not at all   Moving or speaking so slowly that other people could have noticed; or the opposite being so fidgety that others notice Not at all   Thoughts of being better off dead, or hurting yourself in some way Not at all   PHQ 9 Score 0   How difficult have these problems made it for you to do your work, take care of your home and get along with others Not difficult at all       Learning Assessment:  Learning Assessment 2/12/2020   PRIMARY LEARNER Patient   HIGHEST LEVEL OF EDUCATION - PRIMARY LEARNER  > [facility] CAREGIVER No   PRIMARY LANGUAGE ENGLISH   LEARNER PREFERENCE PRIMARY LISTENING   ANSWERED BY self   RELATIONSHIP SELF       Abuse Screening:  Abuse Screening Questionnaire 2/12/2020   Do you ever feel afraid of your partner? N   Are you in a relationship with someone who physically or mentally threatens you? N   Is it safe for you to go home? Y       Generalized Anxiety  No flowsheet data found.       Health Maintenance Due   Topic Date Due    Foot Exam Q1  11/15/1979    PAP AKA CERVICAL CYTOLOGY  10/16/2019    Shingrix Vaccine Age 50> (1 of 2) 11/15/2019    FOBT Q1Y Age 50-75  11/15/2019   . Health Maintenance reviewed and discussed and ordered per Provider. Yves Polo is updated on all     Coordination of Care:  1. Have you been to the ER, urgent care clinic since your last visit? Hospitalized since your last visit? no    2. Have you seen or consulted any other health care providers outside of the [address] [place] [address] since your last visit? Include any pap smears or colon screening. no      Advance Directive:  1. Do you have an advance directive in place? Patient Reply:no    2. If not, would you like material regarding how to put one in place?  Patient Reply: no

## 2020-03-13 NOTE — PROGRESS NOTES
Chief Complaint   Patient presents with    Diabetes     follow up    Hypertension         BRITTANI Cervantes is a 47 y.o. female presenting today for 1 month  follow up of dm, htn. Home bp readings have been 305V systolic. Pt has lost 6# since her last visit per her home scale. Pt notes that her blood sugars are now as low as 140s in the mornings. She feels that the jardiance is helping. She is tolerating it well. Patient none need medication refills today. New concerns today: pt notes that she is having problems with several fingers, particularly the R 3rd and 4th fingers as well as both thumbs. The fingers seem to get stuck in flexion, especially when she first wakes up. Review of Systems   Constitutional: Negative. HENT: Negative. Respiratory: Negative. Cardiovascular: Negative. Musculoskeletal:        B hand trigger fingers   All other systems reviewed and are negative. Physical Exam  Vitals signs and nursing note reviewed. Constitutional:       Appearance: Normal appearance. She is not ill-appearing. HENT:      Head: Normocephalic and atraumatic. Right Ear: External ear normal.      Left Ear: External ear normal.      Nose: Nose normal.   Eyes:      Extraocular Movements: Extraocular movements intact. Conjunctiva/sclera: Conjunctivae normal.   Neck:      Musculoskeletal: Normal range of motion. Cardiovascular:      Rate and Rhythm: Normal rate and regular rhythm. Heart sounds: No murmur. No friction rub. No gallop. Pulmonary:      Effort: Pulmonary effort is normal.      Breath sounds: Normal breath sounds. No wheezing, rhonchi or rales. Musculoskeletal:      Comments: B thumb triggering  R 3rd and 4th finger triggering   Skin:     General: Skin is warm and dry. Neurological:      Mental Status: She is alert and oriented to person, place, and time.       Coordination: Coordination normal.   Psychiatric:         Mood and Affect: Mood normal.         Behavior: Behavior normal.         Thought Content: Thought content normal.         Judgment: Judgment normal.         Diagnoses and all orders for this visit:    1. Type 2 diabetes mellitus without complication, without long-term current use of insulin (HCC)  Improving, cont pres tx plan    2. Essential hypertension  Improving, cont pres tx plan    3. White coat syndrome with hypertension    4. Trigger finger, unspecified finger, unspecified laterality  -     REFERRAL TO ORTHOPEDICS      Follow-up and Dispositions    · Return in about 3 months (around 6/13/2020) for diabetes, high blood pressure.

## 2020-04-14 NOTE — PROGRESS NOTES
Chest Tube HISTORY OF PRESENT ILLNESS  Rachel Regalado is a 51 y.o. female. Chief Complaint   Patient presents with    Follow-up    Diabetes    Hypertension    Cholesterol Problem    Medication Refill       HPI  Pt is here for follow up of Diabetes, Hypertension, and Cholesterol. Pt is fasting today. Pt does need medication refills today. Pt requests electronic refills. New concerns today: none      Health Maintenance reviewed - urine microalbumin ordered, glycohemoglobin ordered, discussed pneumovax (pt declines), pt asked to sched dm eye and foot exams. ROS  Review of Systems   Constitutional: Negative. HENT: Negative. Respiratory: Negative. Cardiovascular: Negative. All other systems reviewed and are negative. Physical Exam  Physical Exam   Nursing note and vitals reviewed. Constitutional: She is oriented to person, place, and time. She appears well-developed and well-nourished. HENT:   Head: Normocephalic and atraumatic. Right Ear: External ear normal.   Left Ear: External ear normal.   Nose: Nose normal.   Eyes: Conjunctivae and EOM are normal.   Neck: Normal range of motion. Neck supple. No JVD present. Carotid bruit is not present. No thyromegaly present. Cardiovascular: Normal rate, regular rhythm, normal heart sounds and intact distal pulses. Exam reveals no gallop and no friction rub. No murmur heard. Pulmonary/Chest: Effort normal and breath sounds normal. She has no wheezes. She has no rhonchi. She has no rales. Abdominal: Soft. Bowel sounds are normal.   Musculoskeletal: Normal range of motion. Neurological: She is alert and oriented to person, place, and time. Coordination normal.   Skin: Skin is warm and dry. Psychiatric: She has a normal mood and affect.  Her behavior is normal. Judgment and thought content normal.     Recent Results (from the past 12 hour(s))   AMB POC HEMOGLOBIN A1C    Collection Time: 05/17/17  9:38 AM   Result Value Ref Range Hemoglobin A1c (POC) 6.5 %     ASSESSMENT and PLAN  Natasha Salmeron was seen today for follow-up, diabetes, hypertension, cholesterol problem and medication refill. Diagnoses and all orders for this visit:    Type 2 diabetes mellitus without complication, without long-term current use of insulin (HCC)  -     SITagliptin-metFORMIN (JANUMET)  mg per tablet; Take 1 Tab by mouth two (2) times daily (with meals). -     AMB POC HEMOGLOBIN A1C  -     REFERRAL TO PODIATRY  -     METABOLIC PANEL, COMPREHENSIVE; Future  -     LIPID PANEL; Future  -     PROTEIN/CREATININE RATIO, URINE; Future  Stable, cont pres tx plan. Essential hypertension, benign  -     lisinopril-hydroCHLOROthiazide (PRINZIDE, ZESTORETIC) 10-12.5 mg per tablet; TAKE ONE TABLET BY MOUTH ONCE DAILY  -     METABOLIC PANEL, COMPREHENSIVE; Future  -     LIPID PANEL; Future  Stable, cont pres tx plan. Vertigo  -     meclizine (ANTIVERT) 12.5 mg tablet; Take 1 Tab by mouth three (3) times daily as needed. Dysmenorrhea  -     naproxen (NAPROSYN) 500 mg tablet; TAKE ONE TABLET BY MOUTH TWICE DAILY WITH MEALS    Dermatitis  -     triamcinolone acetonide (KENALOG) 0.1 % topical cream; Apply  to affected area two (2) times a day. use thin layer    Seasonal allergic rhinitis due to pollen  flonase refilled today.      Follow-up Disposition: 3 months; sooner prn

## 2020-04-27 DIAGNOSIS — Z29.9 PREVENTIVE MEASURE: ICD-10-CM

## 2020-04-27 RX ORDER — FLUCONAZOLE 150 MG/1
TABLET ORAL
Qty: 1 TAB | Refills: 0 | Status: SHIPPED | OUTPATIENT
Start: 2020-04-27 | End: 2020-05-20

## 2020-05-19 DIAGNOSIS — Z29.9 PREVENTIVE MEASURE: ICD-10-CM

## 2020-05-20 RX ORDER — FLUCONAZOLE 150 MG/1
TABLET ORAL
Qty: 1 TAB | Refills: 0 | Status: SHIPPED | OUTPATIENT
Start: 2020-05-20 | End: 2020-06-12 | Stop reason: ALTCHOICE

## 2020-05-27 ENCOUNTER — TELEPHONE (OUTPATIENT)
Dept: FAMILY MEDICINE CLINIC | Age: 50
End: 2020-05-27

## 2020-05-27 ENCOUNTER — OFFICE VISIT (OUTPATIENT)
Dept: FAMILY MEDICINE CLINIC | Age: 50
End: 2020-05-27

## 2020-05-27 VITALS
WEIGHT: 213 LBS | TEMPERATURE: 98.6 F | HEART RATE: 67 BPM | BODY MASS INDEX: 34.23 KG/M2 | HEIGHT: 66 IN | DIASTOLIC BLOOD PRESSURE: 98 MMHG | SYSTOLIC BLOOD PRESSURE: 154 MMHG | OXYGEN SATURATION: 97 % | RESPIRATION RATE: 20 BRPM

## 2020-05-27 DIAGNOSIS — I10 ESSENTIAL HYPERTENSION: ICD-10-CM

## 2020-05-27 DIAGNOSIS — H69.83 EUSTACHIAN TUBE DYSFUNCTION, BILATERAL: Primary | ICD-10-CM

## 2020-05-27 DIAGNOSIS — R42 VERTIGO: ICD-10-CM

## 2020-05-27 RX ORDER — AMOXICILLIN 500 MG/1
CAPSULE ORAL
COMMUNITY
Start: 2020-05-23 | End: 2020-06-12 | Stop reason: ALTCHOICE

## 2020-05-27 NOTE — PROGRESS NOTES
HPI  Pt of Dr Amina Orellana. Was seen at [facility] for ear pain. Said that she started having vertigo a few days before that but then started having pain in left ear, so she went to [facility]    Also notes slight tingling along left jaw line and in left cheek. Has hx of vertigo. Has rx of Meclizine and Zofran that she takes as needed. Has been taking this as directed    Was prescribed Amoxicillin, still taking this. Was still having pain in her ear up until yesterday. Still feeling \"woozy/dizzy\". Was told that she has fluid in her ears    Concerned because she is still dizzy. 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 Has hx of hypertension. Says that she took sinus medication and Naprosyn. Said that home readings have been much better than reading here.      Past Medical History  Past Medical History:   Diagnosis Date    Anemia NEC ///    during pregnancy    AR (allergic rhinitis) 2011    Eczema     since childhood    Gestational diabetes 1993    x1 pregnancy    Hypertension     Type 2 diabetes mellitus without complication, without long-term current use of insulin ([facility] [address] 2016       Surgical History  Past Surgical History:   Procedure Laterality Date    ABDOMEN SURGERY PROC UNLISTED      Explore lap at 10 y/o    DELIVERY   [id number]    x4    HX TOTAL ABDOMINAL HYSTERECTOMY  10/2018    abd hyst with BSO; cervix was left due to scar tissue    LAP,CHOLECYSTECTOMY/EXPLORE  1986    REMOVAL GALLBLADDER          Medications  Current Outpatient Medications   Medication Sig Dispense Refill    amoxicillin (AMOXIL) 500 mg capsule TAKE 1 CAPSULE BY MOUTH THREE TIMES DAILY      fluconazole (DIFLUCAN) 150 mg tablet TAKE 1 TABLET BY MOUTH ONCE DAILY FOR 1 DAY 1 Tab 0    aliskiren (TEKTURNA) 300 mg tablet Take 1 Tab by mouth daily. 30 Tab 5    meclizine (ANTIVERT) 12.5 mg tablet Take 1 Tab by mouth three (3) times daily as needed for Dizziness. 30 Tab 1    triamcinolone acetonide (KENALOG) 0.1 % topical cream APPLY A THIN LAYER TO AFFECTED AREA TWICE DAILY 80 g 3    empagliflozin (JARDIANCE) 10 mg tablet Take 1 Tab by mouth daily. 30 Tab 5    hydroCHLOROthiazide (HYDRODIURIL) 12.5 mg tablet TAKE 1 TABLET BY MOUTH ONCE DAILY 30 Tab 12    fluticasone propionate (FLONASE) 50 mcg/actuation nasal spray USE 2 SPRAY(S) IN EACH NOSTRIL ONCE DAILY 1 Bottle 5    naproxen (NAPROSYN) 500 mg tablet TAKE 1 TABLET BY MOUTH TWICE DAILY WITH FOOD 60 Tab 5    SITagliptin-metFORMIN (JANUMET)  mg per tablet TAKE 1 TABLET BY MOUTH TWICE DAILY WITH MEALS 180 Tab 4    ondansetron (ZOFRAN ODT) 8 mg disintegrating tablet Take 1 Tab by mouth every eight (8) hours as needed for Nausea. 12 Tab 3    VENTOLIN HFA 90 mcg/actuation inhaler INHALE TWO PUFFS BY MOUTH EVERY 4 HOURS AS NEEDED FOR WHEEZING 1 Inhaler 5    cyanocobalamin 1,000 mcg tablet Take 1,000 mcg by mouth daily.  Blood-Glucose Meter monitoring kit Test blood sugar once a day .00 One Touch Ultra Mini Glucose Meter 1 Kit 0    glucose blood VI test strips (ASCENSIA AUTODISC VI, ONE TOUCH ULTRA TEST VI) strip Test blood sugar once daily .00 One Touch Ultra Mini Test strip 1 Package 11    Lancets misc Use with One Touch Ultra Mini Glucose Meter test blood sugar once daily .00 1 Package 11    MULTI-VITAMIN PO Take  by mouth daily.  ascorbic acid (VITAMIN C) 500 mg tablet Take 500 mg by mouth daily.  polyethylene glycol (MIRALAX) 17 gram/dose powder Take 17 g by mouth daily.  1 tablespoon with 8 oz of water daily 510 g 12       Allergies  Allergies   Allergen Reactions    Codeine Nausea Only and Other (comments)     Sweats felt like she was going to pass out    Procardia [Nifedipine] Shortness of Breath, Nausea and Vomiting and Other (comments) Tightness in chest on 7/6/1990    Sulfa (Sulfonamide Antibiotics) Rash    Tylox [Oxycodone-Acetaminophen] Nausea Only and Other (comments)     Sweats, felt like she was going to pass out       Family History  Family History   Problem Relation Age of Onset    Hypertension Mother     Cancer Mother 61        breast    Other Mother         diverticulosis    Allergic Rhinitis Mother     Breast Cancer Mother     Cancer Father 32        glands, neck    Cancer Maternal Grandmother         breast-brain    Breast Cancer Maternal Grandmother     Diabetes Maternal Grandfather     Hypertension Maternal Grandfather     Other Paternal Grandmother         narcolepsy    Eczema Daughter     Asthma Daughter     Allergic Rhinitis Daughter     Eczema Daughter     Allergic Rhinitis Daughter     Allergic Rhinitis Daughter     Allergic Rhinitis Daughter        Social History  Social History     Socioeconomic History    Marital status:      Spouse name: Not on file    Number of children: Not on file    Years of education: Not on file    Highest education level: Not on file   Occupational History    Occupation: Registered Respiratory Therapist   Social Needs    Financial resource strain: Not on file    Food insecurity     Worry: Not on file     Inability: Not on file    Transportation needs     Medical: Not on file     Non-medical: Not on file   Tobacco Use    Smoking status: Never Smoker    Smokeless tobacco: Never Used   Substance and Sexual Activity    Alcohol use: Yes     Comment: rare    Drug use: Yes     Types: Prescription, OTC    Sexual activity: Yes     Partners: Male     Birth control/protection: None   Lifestyle    Physical activity     Days per week: Not on file     Minutes per session: Not on file    Stress: Not on file   Relationships    Social connections     Talks on phone: Not on file     Gets together: Not on file     Attends [employer] service: Not on file     Active member of club or organization: Not on file     Attends meetings of clubs or organizations: Not on file     Relationship status: Not on file    Intimate partner violence     Fear of current or ex partner: Not on file     Emotionally abused: Not on file     Physically abused: Not on file     Forced sexual activity: Not on file   Other Topics Concern    Not on file   Social History Narrative    Not on file       Problem List  Patient Active Problem List   Diagnosis Code    Eczema L30.9    AR (allergic rhinitis) J30.9    DM2 (diabetes mellitus, type 2) ([address] E11.9    Essential hypertension I10    Mixed hyperlipidemia E78.2    Anemia, unspecified D64.9    Type 2 diabetes mellitus without complication, without long-term current use of insulin ([facility] [address] E11.9    Allergic rhinitis J30.9    Essential hypertension with goal blood pressure less than 130/80 I10    Type 2 diabetes with nephropathy ([facility] [address] E11.21    Severe obesity (BMI 35.0-39. 9) E66.01    DUB (dysfunctional uterine bleeding) N93.8    Fibroids D21.9    White coat syndrome with hypertension I10    Eustachian tube dysfunction, bilateral H69.83    Vertigo R42       Review of Systems  Review of Systems   Constitutional: Negative. HENT: Positive for ear pain. Respiratory: Negative. Neurological: Positive for dizziness. Vital Signs  Vitals:    05/27/20 1451 05/27/20 1459   BP: (!) 174/97 (!) 154/98   Pulse: 67    Resp: 20    Temp: 98.6 °F (37 °C)    TempSrc: Oral    SpO2: 97%    Weight: 213 lb (96.6 kg)    Height: 5' 6\" (1.676 m)    PainSc:   0 - No pain    LMP: 10/30/2018       Physical Exam  Physical Exam  Vitals signs and nursing note reviewed. Constitutional:       Appearance: Normal appearance. She is not ill-appearing. HENT:      Right Ear: External ear normal.      Left Ear: External ear normal.      Ears:      Comments: Increased fluid noted behind bilateral TM.        Nose: Nose normal.      Mouth/Throat:      Mouth: Mucous membranes are moist.      Pharynx: Oropharynx is clear. Eyes:      Extraocular Movements: Extraocular movements intact. Pupils: Pupils are equal, round, and reactive to light. Neck:      Musculoskeletal: Normal range of motion and neck supple. Cardiovascular:      Rate and Rhythm: Normal rate and regular rhythm. Heart sounds: Normal heart sounds. Pulmonary:      Effort: Pulmonary effort is normal.      Breath sounds: Normal breath sounds. No wheezing. Lymphadenopathy:      Cervical: No cervical adenopathy. Skin:     General: Skin is warm and dry. Neurological:      General: No focal deficit present. Mental Status: She is alert and oriented to person, place, and time. Cranial Nerves: No cranial nerve deficit. Psychiatric:         Behavior: Behavior normal.         Diagnostics  Orders Placed This Encounter    REFERRAL TO ENT-OTOLARYNGOLOGY     Referral Priority:   Routine     Referral Type:   Consultation     Referral Reason:   Specialty Services Required     Referred to Provider:   Baldemar Nielson MD     Requested Specialty:   Otolaryngology     Number of Visits Requested:   1    amoxicillin (AMOXIL) 500 mg capsule     Sig: TAKE 1 CAPSULE BY MOUTH THREE TIMES DAILY       Results  Results for orders placed or performed in visit on 02/05/20   LIPID PANEL   Result Value Ref Range    Triglyceride 141 40 - 149 mg/dL    HDL Cholesterol 46 >=40 mg/dL    Cholesterol, total 201 (H) 110 - 200 mg/dL    CHOLESTEROL/HDL 4.4 0.0 - 5.0    Non-HDL Cholesterol 155 (H) <130 mg/dL    LDL, calculated 127 (H) 50 - 99 mg/dL    VLDL, calculated 28 8 - 30 mg/dL    LDL/HDL Ratio 2.7    METABOLIC PANEL, COMPREHENSIVE   Result Value Ref Range    Glucose 216 (H) 70 - 99 mg/dL    BUN 7 6 - 22 mg/dL    Creatinine 0.9 0.5 - 1.2 mg/dL    Sodium 137 133 - 145 mmol/L    Potassium 4.3 3.5 - 5.5 mmol/L    Chloride 98 98 - 110 mmol/L    CO2 27 20 - 32 mmol/L    AST (SGOT) 24 10 - 37 U/L    ALT (SGPT) 36 5 - 40 U/L    Alk. phosphatase 147 (H) 25 - 115 U/L    Bilirubin, total 0.2 0.2 - 1.2 mg/dL    Calcium 9.9 8.4 - 10.5 mg/dL    Protein, total 7.3 6.4 - 8.3 g/dL    Albumin 4.3 3.5 - 5.0 g/dL    A-G Ratio 1.4 1.1 - 2.6 ratio    Globulin 3.0 2.0 - 4.0 g/dL    Anion gap 12.0 mmol/L    GFRAA >60.0 >60.0    GFRNA >60.0 >60.0   HEMOGLOBIN A1C W/O EAG   Result Value Ref Range    Hemoglobin A1c 12.1 (H) 4.8 - 5.6 %    AVG  (H) 91 - 123 mg/dL   MICROALBUMIN, UR, RAND W/ MICROALB/CREAT RATIO   Result Value Ref Range    Creatinine, urine 45 mg/dL    Microalbumin, urine 41.7 (H) 0.1 - 17.0 mg/L    Microalb/Creat ratio (ug/mg creat.) 92.7 (H) 0.0 - 30.0     Assessment and Plan  Diagnoses and all orders for this visit:    1. Eustachian tube dysfunction, bilateral  -     REFERRAL TO ENT-OTOLARYNGOLOGY  Encouraged increased fluid intake,  Use Flonase as directed, netipot/ sinus rinses with distilled or boiled water. 2. Vertigo  -     REFERRAL TO ENT-OTOLARYNGOLOGY    3. Essential hypertension  Continue with present plan of care. Follow up with Dr Diya Styles as planned. Monitor blood pressure at home. Reviewed emergency precautions    After care summary printed and reviewed with patient. Plan reviewed with patient. Questions answered. Patient verbalized understanding of plan and is in agreement with plan. Patient to follow up as needed if symptoms worsen/ do not improve. Return to work letter given. Encouraged the use of my chart.     Brain ZOHRA Shannon

## 2020-05-27 NOTE — PROGRESS NOTES
Jazzmine Mann presents today for   Chief Complaint   Patient presents with   [address] [place] [address] ED Follow-up     Recent [facility] visit 5/23/20 related to Left Ear Pain and Vertigo       Is someone accompanying this pt? No    Is the patient using any DME equipment during OV? No    Depression Screening:  3 most recent PHQ Screens 3/13/2020   PHQ Not Done -   Little interest or pleasure in doing things Not at all   Feeling down, depressed, irritable, or hopeless Not at all   Total Score PHQ 2 0   Trouble falling or staying asleep, or sleeping too much Not at all   Feeling tired or having little energy Not at all   Poor appetite, weight loss, or overeating Not at all   Feeling bad about yourself - or that you are a failure or have let yourself or your family down Not at all   Trouble concentrating on things such as school, work, reading, or watching TV Not at all   Moving or speaking so slowly that other people could have noticed; or the opposite being so fidgety that others notice Not at all   Thoughts of being better off dead, or hurting yourself in some way Not at all   PHQ 9 Score 0   How difficult have these problems made it for you to do your work, take care of your home and get along with others Not difficult at all       Learning Assessment:  Learning Assessment 2/12/2020   PRIMARY LEARNER Patient   HIGHEST LEVEL OF EDUCATION - PRIMARY LEARNER  > 4 YEARS [address] CAREGIVER No   PRIMARY LANGUAGE ENGLISH   LEARNER PREFERENCE PRIMARY LISTENING   ANSWERED BY self   RELATIONSHIP SELF       Abuse Screening:  Abuse Screening Questionnaire 2/12/2020   Do you ever feel afraid of your partner? N   Are you in a relationship with someone who physically or mentally threatens you? N   Is it safe for you to go home?  Y         Health Maintenance Due   Topic Date Due    Foot Exam Q1  11/15/1979    PAP AKA CERVICAL CYTOLOGY  10/16/2019    Shingrix Vaccine Age 50> (1 of 2) 11/15/2019    FOBT Q1Y Age 50-75  11/15/2019    A1C test (Diabetic or Prediabetic)  05/05/2020   . Health Maintenance reviewed and discussed and ordered per Provider. Coordination of Care  1. Have you been to the ER, urgent care clinic since your last visit? Hospitalized since your last visit? [facility]/23/20,[facility] related to ear pain. 2. Have you seen or consulted any other health care providers outside of the [address] [place] [address] since your last visit? Include any pap smears or colon screening. No      Advance Directive:  1. Do you have an advance directive in place? Patient Reply:No    2. If not, would you like material regarding how to put one in place?  Patient Reply: No

## 2020-05-27 NOTE — LETTER
NOTIFICATION RETURN TO WORK / SCHOOL 
 
5/27/2020 3:15 PM 
 
Ms. Pamela [address] 
[facility] [address] To Whom It May Concern: 
 
Lalit Kwong is currently under the care of Hyun Casas. Please excuse from work May 27 and May 28. If there are questions or concerns please have the patient contact our office. Sincerely, Kennedi Boateng NP

## 2020-05-28 ENCOUNTER — OFFICE VISIT (OUTPATIENT)
Dept: ORTHOPEDIC SURGERY | Age: 50
End: 2020-05-28

## 2020-05-28 VITALS — TEMPERATURE: 96.6 F | HEIGHT: 66 IN | BODY MASS INDEX: 34.2 KG/M2 | WEIGHT: 212.8 LBS

## 2020-05-28 DIAGNOSIS — M65.331 TRIGGER MIDDLE FINGER OF RIGHT HAND: Primary | ICD-10-CM

## 2020-05-28 DIAGNOSIS — M65.341 TRIGGER RING FINGER OF RIGHT HAND: ICD-10-CM

## 2020-05-28 DIAGNOSIS — M65.332 TRIGGER MIDDLE FINGER OF LEFT HAND: ICD-10-CM

## 2020-05-28 NOTE — PROGRESS NOTES
Nida Bliss is a 47 y.o. female right handed individual, not currently working. Worker's Compensation and legal considerations: not known. Vitals:    20 1008   Temp: (!) 96.6 °F (35.9 °C)   TempSrc: Oral   Weight: 212 lb 12.8 oz (96.5 kg)   Height: 5' 6\" (1.676 m)   PainSc:   5   PainLoc: Hand   LMP: 10/30/2018           Chief Complaint   Patient presents with    Hand Pain     ronnell hand pain         HPI: Bilateral finger triggering in multiple digits. Today, the Right Ring and Bilateral Middle Fingers are bothering her.     Date of onset:  indeterminate    Injury: No    Prior Treatment:  No    Numbness/ Tingling: No      ROS: Review of Systems - General ROS: negative  Psychological ROS: negative  ENT ROS: negative  Allergy and Immunology ROS: negative  Hematological and Lymphatic ROS: negative  Respiratory ROS: no cough, shortness of breath, or wheezing  Cardiovascular ROS: no chest pain or dyspnea on exertion  Gastrointestinal ROS: no abdominal pain, change in bowel habits, or black or bloody stools  Musculoskeletal ROS: positive for - pain in finger - bilateral and hand - bilateral  Neurological ROS: negative  Dermatological ROS: negative    Past Medical History:   Diagnosis Date    Anemia NEC ///    during pregnancy    AR (allergic rhinitis) 2011    Eczema     since childhood    Gestational diabetes 1993    x1 pregnancy    Hypertension     Type 2 diabetes mellitus without complication, without long-term current use of insulin ([facility] [address] 2016       Past Surgical History:   Procedure Laterality Date    ABDOMEN SURGERY PROC UNLISTED      Explore lap at 12 y/o    DELIVERY   [id number]    x4    HX TOTAL ABDOMINAL HYSTERECTOMY  10/2018    abd hyst with BSO; cervix was left due to scar tissue    LAP,CHOLECYSTECTOMY/EXPLORE  1986    REMOVAL GALLBLADDER         Current Outpatient Medications   Medication Sig Dispense Refill    triamcinolone acetonide (KENALOG) 10 mg/mL injection 1 mL by Intra artICUlar route once for 1 dose. 1 Vial 0    amoxicillin (AMOXIL) 500 mg capsule TAKE 1 CAPSULE BY MOUTH THREE TIMES DAILY      fluconazole (DIFLUCAN) 150 mg tablet TAKE 1 TABLET BY MOUTH ONCE DAILY FOR 1 DAY 1 Tab 0    aliskiren (TEKTURNA) 300 mg tablet Take 1 Tab by mouth daily. 30 Tab 5    meclizine (ANTIVERT) 12.5 mg tablet Take 1 Tab by mouth three (3) times daily as needed for Dizziness. 30 Tab 1    triamcinolone acetonide (KENALOG) 0.1 % topical cream APPLY A THIN LAYER TO AFFECTED AREA TWICE DAILY 80 g 3    empagliflozin (JARDIANCE) 10 mg tablet Take 1 Tab by mouth daily. 30 Tab 5    hydroCHLOROthiazide (HYDRODIURIL) 12.5 mg tablet TAKE 1 TABLET BY MOUTH ONCE DAILY 30 Tab 12    fluticasone propionate (FLONASE) 50 mcg/actuation nasal spray USE 2 SPRAY(S) IN EACH NOSTRIL ONCE DAILY 1 Bottle 5    naproxen (NAPROSYN) 500 mg tablet TAKE 1 TABLET BY MOUTH TWICE DAILY WITH FOOD 60 Tab 5    SITagliptin-metFORMIN (JANUMET)  mg per tablet TAKE 1 TABLET BY MOUTH TWICE DAILY WITH MEALS 180 Tab 4    polyethylene glycol (MIRALAX) 17 gram/dose powder Take 17 g by mouth daily. 1 tablespoon with 8 oz of water daily 510 g 12    ondansetron (ZOFRAN ODT) 8 mg disintegrating tablet Take 1 Tab by mouth every eight (8) hours as needed for Nausea. 12 Tab 3    VENTOLIN HFA 90 mcg/actuation inhaler INHALE TWO PUFFS BY MOUTH EVERY 4 HOURS AS NEEDED FOR WHEEZING 1 Inhaler 5    cyanocobalamin 1,000 mcg tablet Take 1,000 mcg by mouth daily.       Blood-Glucose Meter monitoring kit Test blood sugar once a day .00 One Touch Ultra Mini Glucose Meter 1 Kit 0    glucose blood VI test strips (ASCENSIA AUTODISC VI, ONE TOUCH ULTRA TEST VI) strip Test blood sugar once daily .00 One Touch Ultra Mini Test strip 1 Package 11    Lancets misc Use with One Touch Ultra Mini Glucose Meter test blood sugar once daily .00 1 Package 11    MULTI-VITAMIN PO Take  by mouth daily.      ascorbic acid (VITAMIN C) 500 mg tablet Take 500 mg by mouth daily. Allergies   Allergen Reactions    Codeine Nausea Only and Other (comments)     Sweats felt like she was going to pass out    Procardia [Nifedipine] Shortness of Breath, Nausea and Vomiting and Other (comments)     Tightness in chest on 7/6/1990    Sulfa (Sulfonamide Antibiotics) Rash    Tylox [Oxycodone-Acetaminophen] Nausea Only and Other (comments)     Sweats, felt like she was going to pass out           PE:     Physical Exam  Vitals signs and nursing note reviewed. Constitutional:       General: She is not in acute distress. Appearance: Normal appearance. She is normal weight. She is not ill-appearing, toxic-appearing or diaphoretic. HENT:      Head: Normocephalic and atraumatic. Nose: Nose normal.      Mouth/Throat:      Mouth: Mucous membranes are moist.   Eyes:      Extraocular Movements: Extraocular movements intact. Pupils: Pupils are equal, round, and reactive to light. Neck:      Musculoskeletal: Normal range of motion. No neck rigidity. Cardiovascular:      Pulses: Normal pulses. Pulmonary:      Effort: Pulmonary effort is normal. No respiratory distress. Abdominal:      General: Abdomen is flat. There is no distension. Musculoskeletal:         General: Swelling and tenderness present. No deformity or signs of injury. Right lower leg: No edema. Left lower leg: No edema. Skin:     General: Skin is warm. Capillary Refill: Capillary refill takes less than 2 seconds. Coloration: Skin is not jaundiced or pale. Findings: No bruising, erythema, lesion or rash. Neurological:      General: No focal deficit present. Mental Status: She is alert. Cranial Nerves: No cranial nerve deficit. Sensory: No sensory deficit. Motor: No weakness.       Coordination: Coordination normal.      Gait: Gait normal.      Deep Tendon Reflexes: Reflexes normal. Psychiatric:         Mood and Affect: Mood normal.         Behavior: Behavior normal.         Thought Content: Thought content normal.         Judgment: Judgment normal.            Hand:    Examination L Digit(s) R Digit(s)   1st CMC Tenderness -  -    1st CMC Grind -  -    Zak Nodes -  -    Heberden Nodes -  -    A1 Pulley Tenderness + LF + LF, RF   Triggering +  +    UCL Instability -  -    RCL Instability -  -    Lateral Stress Pain -  -    Palmar Cords -  -    Tabletop test -  -    Garrod's Pads -  -     Strength       Pinch Strength         ROM: Full        Imaging:     None indicated        ICD-10-CM ICD-9-CM    1. Trigger middle finger of right hand M65.331 727.03 TRIAMCINOLONE ACETONIDE INJ      triamcinolone acetonide (KENALOG) 10 mg/mL injection      INJECT TENDON SHEATH/LIGAMENT   2. Trigger middle finger of left hand M65.332 727.03 TRIAMCINOLONE ACETONIDE INJ      triamcinolone acetonide (KENALOG) 10 mg/mL injection      INJECT TENDON SHEATH/LIGAMENT   3. Trigger ring finger of right hand M65.341 727.03 TRIAMCINOLONE ACETONIDE INJ      triamcinolone acetonide (KENALOG) 10 mg/mL injection      INJECT TENDON SHEATH/LIGAMENT         Plan:     Bilateral Long Finger and Right Ring Finger A1 Pulley Injections    Follow-up and Dispositions    · Return if symptoms worsen or fail to improve.           Plan was reviewed with patient, who verbalized agreement and understanding of the plan    [address] [facility] NOTE        Chart reviewed for the following:   Ricardo SAHNI DO, have reviewed the History, Physical and updated the Allergic reactions for Nayomi [address] performed immediately prior to start of procedure:   Ricardo SAHNI DO, have performed the following reviews on Keyur Espino prior to the start of the procedure:            * Patient was identified by name and date of birth   * Agreement on procedure being performed was verified  * Risks and Benefits explained to the patient  * Procedure site verified and marked as necessary  * Patient was positioned for comfort  * Consent was signed and verified     Time: 10:30 AM      Date of procedure: 5/28/2020    Procedure performed by: Winsome Cassidy DO    Provider assisted by: Nii Arroyo LPN    Patient assisted by: self    How tolerated by patient: tolerated the procedure well with no complications    Post Procedural Pain Scale: 0 - No Hurt    Comments: none    Procedure:  After consent was obtained, using sterile technique the tendon was prepped. Local anesthetic used: 1% lidocaine. Kenalog 5 mg X3 and was then injected and the needle withdrawn. The procedure was well tolerated. The patient is asked to continue to rest the area for a few more days before resuming regular activities. It may be more painful for the first 1-2 days. Watch for fever, or increased swelling or persistent pain in the joint. Call or return to clinic prn if such symptoms occur or there is failure to improve as anticipated.

## 2020-05-28 NOTE — PATIENT INSTRUCTIONS
Trigger Finger: Care Instructions Overview A trigger finger is a finger stuck in a bent position. It happens when the tendon that bends and straightens the thumb or finger can't slide smoothly under the ligaments that hold the tendon against the bones. In most cases, it's caused by a bump (nodule) that forms on the tendon. The bent finger usually straightens out on its own. A trigger finger can be painful. But it normally isn't a serious problem. Trigger fingers seem to occur more in some groups of people. These groups include: · People who have diabetes or arthritis. · People who have injured their hands in the past. 
· Musicians. · People who  tools often. Rest and exercises may help your finger relax so that it can bend. You may get a corticosteroid shot. This can reduce swelling and pain. Your doctor may put a splint on your finger. It will give your finger some rest. You may need surgery if the finger keeps locking in a bent position. Follow-up care is a key part of your treatment and safety. Be sure to make and go to all appointments, and call your doctor if you are having problems. It's also a good idea to know your test results and keep a list of the medicines you take. How can you care for yourself at home? · If your doctor put a splint on your finger, wear it as directed. Don't take it off until your doctor says you can. · You may need to change your activities to avoid movements that irritate the finger. · Take your medicines exactly as prescribed. Call your doctor if you think you are having a problem with your medicine. · Ask your doctor if you can take an over-the-counter pain medicine, such as acetaminophen (Tylenol), ibuprofen (Advil, Motrin), or naproxen (Aleve). Be safe with medicines. Read and follow all instructions on the label. · If your doctor recommends exercises, do them as directed. When should you call for help? Call your doctor now or seek immediate medical care if: 
· Your finger locks in a bent position and will not straighten. Watch closely for changes in your health, and be sure to contact your doctor if: 
· You do not get better as expected. Where can you learn more? Go to http://samuel-kelsey.info/ Enter M826 in the search box to learn more about \"Trigger Finger: Care Instructions. \" Current as of: March 2, 2020               Content Version: 12.5 © 3167-9704 [employer]. Care instructions adapted under license by [employer] (which disclaims liability or warranty for this information). If you have questions about a medical condition or this instruction, always ask your healthcare professional. [address] any warranty or liability for your use of this information.

## 2020-06-11 ENCOUNTER — HOSPITAL ENCOUNTER (OUTPATIENT)
Dept: LAB | Age: 50
Discharge: HOME | End: 2020-06-11

## 2020-06-11 LAB — SENTARA SPECIMEN COL,SENBCF: NORMAL

## 2020-06-11 PROCEDURE — 99001 SPECIMEN HANDLING PT-LAB: CPT

## 2020-06-12 ENCOUNTER — VIRTUAL VISIT (OUTPATIENT)
Dept: FAMILY MEDICINE CLINIC | Age: 50
End: 2020-06-12

## 2020-06-12 DIAGNOSIS — R42 DIZZINESS: ICD-10-CM

## 2020-06-12 DIAGNOSIS — E11.9 TYPE 2 DIABETES MELLITUS WITHOUT COMPLICATION, WITHOUT LONG-TERM CURRENT USE OF INSULIN (HCC): Primary | ICD-10-CM

## 2020-06-12 DIAGNOSIS — B37.31 VAGINAL CANDIDIASIS: ICD-10-CM

## 2020-06-12 DIAGNOSIS — I10 ESSENTIAL HYPERTENSION: ICD-10-CM

## 2020-06-12 LAB
A-G RATIO,AGRAT: 1.4 RATIO (ref 1.1–2.6)
ALBUMIN SERPL-MCNC: 4.2 G/DL (ref 3.5–5)
ALP SERPL-CCNC: 152 U/L (ref 25–115)
ALT SERPL-CCNC: 26 U/L (ref 5–40)
ANION GAP SERPL CALC-SCNC: 12 MMOL/L
AST SERPL W P-5'-P-CCNC: 17 U/L (ref 10–37)
AVG GLU, 10930: 299 MG/DL (ref 91–123)
BILIRUB SERPL-MCNC: 0.2 MG/DL (ref 0.2–1.2)
BUN SERPL-MCNC: 17 MG/DL (ref 6–22)
CALCIUM SERPL-MCNC: 10 MG/DL (ref 8.4–10.5)
CHLORIDE SERPL-SCNC: 98 MMOL/L (ref 98–110)
CHOLEST SERPL-MCNC: 248 MG/DL (ref 110–200)
CO2 SERPL-SCNC: 26 MMOL/L (ref 20–32)
CREAT SERPL-MCNC: 1.2 MG/DL (ref 0.5–1.2)
CREATININE, URINE: 59 MG/DL
GFRAA, 66117: 55.5
GFRNA, 66118: 45.8
GLOBULIN,GLOB: 3 G/DL (ref 2–4)
GLUCOSE SERPL-MCNC: 203 MG/DL (ref 70–99)
HBA1C MFR BLD HPLC: 12 % (ref 4.8–5.6)
HDLC SERPL-MCNC: 49 MG/DL
HDLC SERPL-MCNC: 5.1 MG/DL (ref 0–5)
LDL/HDL RATIO,LDHD: 3.4
LDLC SERPL CALC-MCNC: 169 MG/DL (ref 50–99)
MICROALB/CREAT RATIO, 140286: NORMAL
MICROALBUMIN,URINE RANDOM 140054: <12 MG/L (ref 0.1–17)
NON-HDL CHOLESTEROL, 011976: 199 MG/DL
POTASSIUM SERPL-SCNC: 4.3 MMOL/L (ref 3.5–5.5)
PROT SERPL-MCNC: 7.2 G/DL (ref 6.4–8.3)
SODIUM SERPL-SCNC: 136 MMOL/L (ref 133–145)
TRIGL SERPL-MCNC: 148 MG/DL (ref 40–149)
VLDLC SERPL CALC-MCNC: 30 MG/DL (ref 8–30)

## 2020-06-12 NOTE — PROGRESS NOTES
Zulema Samuel is a 47 y.o. female who was seen by synchronous (real-time) audio-video technology on 6/12/2020. Consent: Zulema Samuel, who was seen by synchronous (real-time) audio-video technology, and/or her healthcare decision maker, is aware that this patient-initiated, Telehealth encounter on 6/12/2020 is a billable service, with coverage as determined by her insurance carrier. She is aware that she may receive a bill and has provided verbal consent to proceed: Yes. Assessment & Plan:   Diagnoses and all orders for this visit:    1. Type 2 diabetes mellitus without complication, without long-term current use of insulin ([address]  Consider d/c of jardiance due to recurrent vag candidiasis. Discussed f/u with gyn for alternative tx options; pt declines. 2. Dizziness  eval in progress. Will need appt for fmla paperwork completion. 3. Essential hypertension  Stable, cont pres tx plan. 4. Vaginal candidiasis  Recurrent; consider change of dm med to decrease frequency. The complexity of medical decision making for this visit is moderate   Follow-up and Dispositions    · Return in about 4 days (around 6/16/2020) for fmla paperwork completion. 712  Subjective:   Zulema Samuel is a 47 y.o. female who was seen for Diabetes and Hypertension    Patient contacted via doxy. me. Pt has had her trigger finger injections with Dr. Mónica Kilgore. She is doing well and reports the fingers that were injected are pain free. Pt was referred to ent for eustachian tube dysfunction. There was no more fluid in her ear. She has missed a lot of work due to ongoing dizziness. She has fmla paperwork that she needs to have completed. Ent plans to do allergy testing. Pt also c/o numbness from her ear to her mouth. Pt notes that her bp was 110/70 at her ent appt. It was also wnl at her eye doctor appt recently. Home bs readings are variable.   They range 140-160 fasting in the morning. She feels the jardiance is causing chronic yeast infections. She requests a refill of diflucan. She feels that she gets a yeast infection every 10-12 days. Pt is willing to change medications. Prior to Admission medications    Medication Sig Start Date End Date Taking? Authorizing Provider   SITagliptin-metFORMIN (Janumet)  mg per tablet TAKE 1 TABLET BY MOUTH TWICE DAILY WITH MEALS 6/10/20  Yes Oleksandr Thorne MD   aliskiren (TEKTURNA) 300 mg tablet Take 1 Tab by mouth daily. 2/13/20  Yes Oleksandr Thorne MD   meclizine (ANTIVERT) 12.5 mg tablet Take 1 Tab by mouth three (3) times daily as needed for Dizziness. 2/12/20  Yes Diogo Boggs MD   triamcinolone acetonide (KENALOG) 0.1 % topical cream APPLY A THIN LAYER TO AFFECTED AREA TWICE DAILY 2/12/20  Yes Diogo Boggs MD   empagliflozin (JARDIANCE) 10 mg tablet Take 1 Tab by mouth daily. 2/12/20  Yes Diogo Boggs MD   hydroCHLOROthiazide (HYDRODIURIL) 12.5 mg tablet TAKE 1 TABLET BY MOUTH ONCE DAILY 2/8/20  Yes Diogo Boggs MD   fluticasone propionate (FLONASE) 50 mcg/actuation nasal spray USE 2 SPRAY(S) IN EACH NOSTRIL ONCE DAILY 11/8/19  Yes Diogo Boggs MD   naproxen (NAPROSYN) 500 mg tablet TAKE 1 TABLET BY MOUTH TWICE DAILY WITH FOOD 8/21/19  Yes Diogo Boggs MD   ondansetron (ZOFRAN ODT) 8 mg disintegrating tablet Take 1 Tab by mouth every eight (8) hours as needed for Nausea. 3/7/18  Yes Diogo Boggs MD   VENTOLIN HFA 90 mcg/actuation inhaler INHALE TWO PUFFS BY MOUTH EVERY 4 HOURS AS NEEDED FOR WHEEZING 9/5/17  Yes Gill Boggs MD   cyanocobalamin 1,000 mcg tablet Take 1,000 mcg by mouth daily.    Yes Provider, Historical   Blood-Glucose Meter monitoring kit Test blood sugar once a day .00 One Touch Ultra Mini Glucose Meter 5/6/14  Yes Gill Boggs MD   glucose blood VI test strips (ASCENSIA AUTODISC VI, ONE TOUCH ULTRA TEST VI) strip Test blood sugar once daily .00 One Touch Ultra Mini Test strip 5/6/14  Yes Oleksandr Thorne MD   Lancets misc Use with One Touch Ultra Mini Glucose Meter test blood sugar once daily .00 5/6/14  Yes Diogo Boggs MD   MULTI-VITAMIN PO Take  by mouth daily. 6/7/11  Yes Provider, Historical   ascorbic acid (VITAMIN C) 500 mg tablet Take 500 mg by mouth daily. 6/7/11  Yes Provider, Historical   polyethylene glycol (MIRALAX) 17 gram/dose powder Take 17 g by mouth daily. 1 tablespoon with 8 oz of water daily 8/16/18   Oleksandr Thorne MD     Allergies   Allergen Reactions    Codeine Nausea Only and Other (comments)     Sweats felt like she was going to pass out    Procardia [Nifedipine] Shortness of Breath, Nausea and Vomiting and Other (comments)     Tightness in chest on 7/6/1990    Sulfa (Sulfonamide Antibiotics) Rash    Tylox [Oxycodone-Acetaminophen] Nausea Only and Other (comments)     Sweats, felt like she was going to pass out       Patient Active Problem List   Diagnosis Code    Eczema L30.9    AR (allergic rhinitis) J30.9    DM2 (diabetes mellitus, type 2) ([facility] E11.9    Essential hypertension I10    Mixed hyperlipidemia E78.2    Anemia, unspecified D64.9    Type 2 diabetes mellitus without complication, without long-term current use of insulin ([facility]) E11.9    Allergic rhinitis J30.9    Essential hypertension with goal blood pressure less than 130/80 I10    Type 2 diabetes with nephropathy ([facility]) E11.21    Severe obesity (BMI 35.0-39. 9) E66.01    DUB (dysfunctional uterine bleeding) N93.8    Fibroids D21.9    White coat syndrome with hypertension I10    Eustachian tube dysfunction, bilateral H69.83    Vertigo R42     Current Outpatient Medications   Medication Sig Dispense Refill    SITagliptin-metFORMIN (Janumet)  mg per tablet TAKE 1 TABLET BY MOUTH TWICE DAILY WITH MEALS 60 Tab 12    aliskiren (TEKTURNA) 300 mg tablet Take 1 Tab by mouth daily.  30 Tab 5    meclizine (ANTIVERT) 12.5 mg tablet Take 1 Tab by mouth three (3) times daily as needed for Dizziness. 30 Tab 1    triamcinolone acetonide (KENALOG) 0.1 % topical cream APPLY A THIN LAYER TO AFFECTED AREA TWICE DAILY 80 g 3    empagliflozin (JARDIANCE) 10 mg tablet Take 1 Tab by mouth daily. 30 Tab 5    hydroCHLOROthiazide (HYDRODIURIL) 12.5 mg tablet TAKE 1 TABLET BY MOUTH ONCE DAILY 30 Tab 12    fluticasone propionate (FLONASE) 50 mcg/actuation nasal spray USE 2 SPRAY(S) IN EACH NOSTRIL ONCE DAILY 1 Bottle 5    naproxen (NAPROSYN) 500 mg tablet TAKE 1 TABLET BY MOUTH TWICE DAILY WITH FOOD 60 Tab 5    ondansetron (ZOFRAN ODT) 8 mg disintegrating tablet Take 1 Tab by mouth every eight (8) hours as needed for Nausea. 12 Tab 3    VENTOLIN HFA 90 mcg/actuation inhaler INHALE TWO PUFFS BY MOUTH EVERY 4 HOURS AS NEEDED FOR WHEEZING 1 Inhaler 5    cyanocobalamin 1,000 mcg tablet Take 1,000 mcg by mouth daily.  Blood-Glucose Meter monitoring kit Test blood sugar once a day .00 One Touch Ultra Mini Glucose Meter 1 Kit 0    glucose blood VI test strips (ASCENSIA AUTODISC VI, ONE TOUCH ULTRA TEST VI) strip Test blood sugar once daily .00 One Touch Ultra Mini Test strip 1 Package 11    Lancets misc Use with One Touch Ultra Mini Glucose Meter test blood sugar once daily .00 1 Package 11    MULTI-VITAMIN PO Take  by mouth daily.  ascorbic acid (VITAMIN C) 500 mg tablet Take 500 mg by mouth daily.  polyethylene glycol (MIRALAX) 17 gram/dose powder Take 17 g by mouth daily.  1 tablespoon with 8 oz of water daily 510 g 12     Allergies   Allergen Reactions    Codeine Nausea Only and Other (comments)     Sweats felt like she was going to pass out    Procardia [Nifedipine] Shortness of Breath, Nausea and Vomiting and Other (comments)     Tightness in chest on 7/6/1990    Sulfa (Sulfonamide Antibiotics) Rash    Tylox [Oxycodone-Acetaminophen] Nausea Only and Other (comments)     Sweats, felt like she was going to pass out     Past Medical History: Diagnosis Date    Anemia NEC //    during pregnancy    AR (allergic rhinitis) 2011    Eczema     since childhood    Gestational diabetes 1993    x1 pregnancy    Hypertension     Type 2 diabetes mellitus without complication, without long-term current use of insulin ([facility] 2016     Past Surgical History:   Procedure Laterality Date    ABDOMEN SURGERY PROC UNLISTED      Explore lap at 12 y/o    DELIVERY   [id number]    x4    HX TOTAL ABDOMINAL HYSTERECTOMY  10/2018    abd hyst with BSO; cervix was left due to scar tissue    LAP,CHOLECYSTECTOMY/EXPLORE  1986    REMOVAL GALLBLADDER       Family History   Problem Relation Age of Onset    Hypertension Mother     Cancer Mother 61        breast    Other Mother         diverticulosis    Allergic Rhinitis Mother     Breast Cancer Mother     Cancer Father 32        glands, neck    Cancer Maternal Grandmother         breast-brain    Breast Cancer Maternal Grandmother     Diabetes Maternal Grandfather     Hypertension Maternal Grandfather     Other Paternal Grandmother         narcolepsy    Eczema Daughter     Asthma Daughter     Allergic Rhinitis Daughter     Eczema Daughter     Allergic Rhinitis Daughter     Allergic Rhinitis Daughter     Allergic Rhinitis Daughter      Social History     Tobacco Use    Smoking status: Never Smoker    Smokeless tobacco: Never Used   Substance Use Topics    Alcohol use: Yes     Comment: rare       Review of Systems   Constitutional: Negative. HENT: Negative. Respiratory: Negative. Cardiovascular: Negative. All other systems reviewed and are negative.         Objective:     Visit Vitals  LMP 10/30/2018      General: alert, cooperative, no distress   Mental  status: normal mood, behavior, speech, dress, motor activity, and thought processes, able to follow commands   HENT: NCAT   Neck: no visualized mass   Resp: no respiratory distress   Neuro: no gross deficits   Skin: no discoloration or lesions of concern on visible areas   Psychiatric: normal affect, consistent with stated mood, no evidence of hallucinations     Additional exam findings: none      We discussed the expected course, resolution and complications of the diagnosis(es) in detail. Medication risks, benefits, costs, interactions, and alternatives were discussed as indicated. I advised her to contact the office if her condition worsens, changes or fails to improve as anticipated. She expressed understanding with the diagnosis(es) and plan. Chapito Gutierrez is a 47 y.o. female who was evaluated by a video visit encounter for concerns as above. Patient identification was verified prior to start of the visit. A caregiver was present when appropriate. Due to this being a TeleHealth encounter (During [facility] public health emergency), evaluation of the following organ systems was limited: Vitals/Constitutional/EENT/Resp/CV/GI//MS/Neuro/Skin/Heme-Lymph-Imm. Pursuant to the emergency declaration under the [facility]5 waiver authority and the [employer] Act, this Virtual  Visit was conducted, with patient's (and/or legal guardian's) consent, to reduce the patient's risk of exposure to COVID-19 and provide necessary medical care. Services were provided through a video synchronous discussion virtually to substitute for in-person clinic visit. Patient and provider were located at their individual homes.       Coral Lofton MD

## 2020-06-12 NOTE — PROGRESS NOTES
Nicole Parkinson presents today for   Chief Complaint   Patient presents with    Diabetes    Hypertension       Nicole Parkinson preferred language for health care discussion is english/other. Is someone accompanying this pt? no    Is the patient using any DME equipment during [address]? no    Depression Screening:  3 most recent PHQ Screens 5/28/2020   PHQ Not Done -   Little interest or pleasure in doing things Not at all   Feeling down, depressed, irritable, or hopeless Not at all   Total Score PHQ 2 0   Trouble falling or staying asleep, or sleeping too much -   Feeling tired or having little energy -   Poor appetite, weight loss, or overeating -   Feeling bad about yourself - or that you are a failure or have let yourself or your family down -   Trouble concentrating on things such as school, work, reading, or watching TV -   Moving or speaking so slowly that other people could have noticed; or the opposite being so fidgety that others notice -   Thoughts of being better off dead, or hurting yourself in some way -   PHQ 9 Score -   How difficult have these problems made it for you to do your work, take care of your home and get along with others -       Learning Assessment:  Learning Assessment 2/12/2020   PRIMARY LEARNER Patient   HIGHEST LEVEL OF EDUCATION - PRIMARY LEARNER  > 4 YEARS OF COLLEGE   BARRIERS PRIMARY LEARNER NONE   CO-LEARNER CAREGIVER No   PRIMARY LANGUAGE ENGLISH   LEARNER PREFERENCE PRIMARY LISTENING   ANSWERED BY self   RELATIONSHIP SELF       Abuse Screening:  Abuse Screening Questionnaire 2/12/2020   Do you ever feel afraid of your partner? N   Are you in a relationship with someone who physically or mentally threatens you? N   Is it safe for you to go home? Y       Generalized Anxiety  No flowsheet data found.       Health Maintenance Due   Topic Date Due    Foot Exam Q1  11/15/1979    PAP AKA CERVICAL CYTOLOGY  10/16/2019    Shingrix Vaccine Age 50> (1 of 2) 11/15/2019    FOBT Q1Y Age 50-75  11/15/2019    A1C test (Diabetic or Prediabetic)  05/05/2020   . Health Maintenance reviewed and discussed and ordered per Provider. Coordination of Care:  1. Have you been to the ER, urgent care clinic since your last visit? Hospitalized since your last visit? no    2. Have you seen or consulted any other health care providers outside of the [address] [place] [address] since your last visit? Include any pap smears or colon screening.  Yes, ENT      Advance Directive:  Discussed 2/12/20

## 2020-06-16 ENCOUNTER — VIRTUAL VISIT (OUTPATIENT)
Dept: FAMILY MEDICINE CLINIC | Age: 50
End: 2020-06-16

## 2020-06-16 DIAGNOSIS — R42 VERTIGO: Primary | ICD-10-CM

## 2020-06-16 DIAGNOSIS — Z02.79 MEDICAL CERTIFICATE ISSUANCE: ICD-10-CM

## 2020-06-16 NOTE — PROGRESS NOTES
Nelson Zambrano is a 47 y.o. female evaluated via audio only technology on 6/16/2020. Consent: She and/or her health care decision maker is aware that she may receive a bill for this audio only encounter, depending on her insurance coverage, and has provided verbal consent to proceed: NA - Consent obtained within past 12 monthsvertigo      I communicated with the patient and/or health care decision maker about the nature and details of the following:  Assessment & Plan:   Diagnoses and all orders for this visit:    1. Vertigo  -     REFERRAL TO NEUROLOGY  Long discussion of possible etiologies, work up to date, and plan for further eval.    2. Medical certificate issuance  Forms completed with pt's input. Pt aware forms will be faxed per her request.     Follow-up and Dispositions    · Return if symptoms worsen or fail to improve. 12  Subjective:   Nelson Zambrano is a 47 y.o. female who was seen for Dizziness and Form Completion    Pt c/o recent ear pain and vertigo. Her vertigo cont in spite of meclizine. She tried taking \"sinus tablets\" but this also did not help. She was seen at an urgent care. She was told that there was fluid behind her L tm; she was treated with amox. She still did not improve. Pt was seen at this ofc by KOFFI Joshi on 5/27/2020. Pt was dx with eustachian tube dysfunction and referred to ent. Pt was not having pain at that time, just the vertigo. She was seen by ent on 6/8/2020. The ent told pt she did not have fluid behind her ears. Pt was offered allergy testing. It has not yet been scheduled. Pt has been out of work for several shifts due to the vertigo. She needs [facility] paperwork completed to cover for the time she is out of work. Pt's allergy sx have improved at this point but the vertigo is ongoing. Meclizine usually helps her vertigo symptoms but currently is it not helpful at all.       Prior to Admission medications    Medication Sig Start Date End Date Taking? Authorizing Provider   meclizine (ANTIVERT) 25 mg tablet Take 1 Tab by mouth three (3) times daily as needed for Dizziness for up to 10 days. 6/22/20 7/2/20  Luis POND MD   SITagliptin-metFORMIN (Janumet)  mg per tablet TAKE 1 TABLET BY MOUTH TWICE DAILY WITH MEALS 6/10/20   Tavares To MD   aliskiren (TEKTURNA) 300 mg tablet Take 1 Tab by mouth daily. 2/13/20   Tavares To MD   triamcinolone acetonide (KENALOG) 0.1 % topical cream APPLY A THIN LAYER TO AFFECTED AREA TWICE DAILY 2/12/20   Tavares To MD   empagliflozin (JARDIANCE) 10 mg tablet Take 1 Tab by mouth daily. 2/12/20   Tavares To MD   hydroCHLOROthiazide (HYDRODIURIL) 12.5 mg tablet TAKE 1 TABLET BY MOUTH ONCE DAILY 2/8/20   Tavares To MD   fluticasone propionate (FLONASE) 50 mcg/actuation nasal spray USE 2 SPRAY(S) IN EACH NOSTRIL ONCE DAILY 11/8/19   Tavares To MD   naproxen (NAPROSYN) 500 mg tablet TAKE 1 TABLET BY MOUTH TWICE DAILY WITH FOOD 8/21/19   Tavares To MD   polyethylene glycol (MIRALAX) 17 gram/dose powder Take 17 g by mouth daily. 1 tablespoon with 8 oz of water daily 8/16/18   Tavares To MD   ondansetron (ZOFRAN ODT) 8 mg disintegrating tablet Take 1 Tab by mouth every eight (8) hours as needed for Nausea. 3/7/18   Tavares To MD   VENTOLIN HFA 90 mcg/actuation inhaler INHALE TWO PUFFS BY MOUTH EVERY 4 HOURS AS NEEDED FOR WHEEZING 9/5/17   Tavares To MD   cyanocobalamin 1,000 mcg tablet Take 1,000 mcg by mouth daily.     Provider, Historical   Blood-Glucose Meter monitoring kit Test blood sugar once a day .00 One Touch Ultra Mini Glucose Meter 5/6/14   Lg Boggs MD   glucose blood VI test strips (ASCENSIA AUTODISC VI, ONE TOUCH ULTRA TEST VI) strip Test blood sugar once daily .00 One Touch Ultra Mini Test strip 5/6/14   Tavares To MD   Lancets [facility] Use with One Touch Ultra Mini Glucose Meter test blood sugar once daily .00 5/6/14   Annette Sosa MD   MULTI-VITAMIN PO Take  by mouth daily. 6/7/11   Provider, Historical   ascorbic acid (VITAMIN C) 500 mg tablet Take 500 mg by mouth daily. 6/7/11   Provider, Historical     Allergies   Allergen Reactions    Codeine Nausea Only and Other (comments)     Sweats felt like she was going to pass out    Procardia [Nifedipine] Shortness of Breath, Nausea and Vomiting and Other (comments)     Tightness in chest on 7/6/1990    Sulfa (Sulfonamide Antibiotics) Rash    Tylox [Oxycodone-Acetaminophen] Nausea Only and Other (comments)     Sweats, felt like she was going to pass out       Patient Active Problem List   Diagnosis Code    Eczema L30.9    AR (allergic rhinitis) J30.9    DM2 [facility]diabetes mellitus, type 2) [facility] E11.9    Essential hypertension I10    Mixed hyperlipidemia E78.2    Anemia, unspecified D64.9    Type 2 diabetes mellitus without complication, without long-term current use of insulin ([facility]) E11.9    Allergic rhinitis J30.9    Essential hypertension with goal blood pressure less than 130/80 I10    Type 2 diabetes with nephropathy (HCC) E11.21    Severe obesity (BMI 35.0-39. 9) E66.01    DUB (dysfunctional uterine bleeding) N93.8    Fibroids D21.9    White coat syndrome with hypertension I10    Eustachian tube dysfunction, bilateral H69.83    Vertigo R42     Current Outpatient Medications   Medication Sig Dispense Refill    meclizine (ANTIVERT) 25 mg tablet Take 1 Tab by mouth three (3) times daily as needed for Dizziness for up to 10 days. 30 Tab 1    SITagliptin-metFORMIN (Janumet)  mg per tablet TAKE 1 TABLET BY MOUTH TWICE DAILY WITH MEALS 60 Tab 12    aliskiren (TEKTURNA) 300 mg tablet Take 1 Tab by mouth daily. 30 Tab 5    triamcinolone acetonide (KENALOG) 0.1 % topical cream APPLY A THIN LAYER TO AFFECTED AREA TWICE DAILY 80 g 3    empagliflozin (JARDIANCE) 10 mg tablet Take 1 Tab by mouth daily.  30 Tab 5    hydroCHLOROthiazide (HYDRODIURIL) 12.5 mg tablet TAKE 1 TABLET BY MOUTH ONCE DAILY 30 Tab 12    fluticasone propionate (FLONASE) 50 mcg/actuation nasal spray USE 2 SPRAY(S) IN EACH NOSTRIL ONCE DAILY 1 Bottle 5    naproxen (NAPROSYN) 500 mg tablet TAKE 1 TABLET BY MOUTH TWICE DAILY WITH FOOD 60 Tab 5    polyethylene glycol (MIRALAX) 17 gram/dose powder Take 17 g by mouth daily. 1 tablespoon with 8 oz of water daily 510 g 12    ondansetron (ZOFRAN ODT) 8 mg disintegrating tablet Take 1 Tab by mouth every eight (8) hours as needed for Nausea. 12 Tab 3    VENTOLIN HFA 90 mcg/actuation inhaler INHALE TWO PUFFS BY MOUTH EVERY 4 HOURS AS NEEDED FOR WHEEZING 1 Inhaler 5    cyanocobalamin 1,000 mcg tablet Take 1,000 mcg by mouth daily.  Blood-Glucose Meter monitoring kit Test blood sugar once a day .00 One Touch Ultra Mini Glucose Meter 1 Kit 0    glucose blood VI test strips (ASCENSIA AUTODISC VI, ONE TOUCH ULTRA TEST VI) strip Test blood sugar once daily .00 One Touch Ultra Mini Test strip 1 Package 11    Lancets misc Use with One Touch Ultra Mini Glucose Meter test blood sugar once daily .00 1 Package 11    MULTI-VITAMIN PO Take  by mouth daily.  ascorbic acid (VITAMIN C) 500 mg tablet Take 500 mg by mouth daily.        Allergies   Allergen Reactions    Codeine Nausea Only and Other (comments)     Sweats felt like she was going to pass out    Procardia [Nifedipine] Shortness of Breath, Nausea and Vomiting and Other (comments)     Tightness in chest on 7/6/1990    Sulfa (Sulfonamide Antibiotics) Rash    Tylox [Oxycodone-Acetaminophen] Nausea Only and Other (comments)     Sweats, felt like she was going to pass out     Past Medical History:   Diagnosis Date    Anemia NEC 1987/1988/1990/1992    during pregnancy    AR (allergic rhinitis) 6/7/2011    Eczema     since childhood    Gestational diabetes 1993    x1 pregnancy    Hypertension     Type 2 diabetes mellitus without complication, without long-term current use of insulin ([facility] 2016     Past Surgical History:   Procedure Laterality Date    ABDOMEN SURGERY PROC UNLISTED      Explore lap at 12 y/o    DELIVERY   [id number]    x4    HX TOTAL ABDOMINAL HYSTERECTOMY  10/2018    abd hyst with BSO; cervix was left due to scar tissue    LAP,CHOLECYSTECTOMY/EXPLORE  1986    REMOVAL GALLBLADDER       Family History   Problem Relation Age of Onset    Hypertension Mother     Cancer Mother 61        breast    Other Mother         diverticulosis    Allergic Rhinitis Mother     Breast Cancer Mother     Cancer Father 32        glands, neck    Cancer Maternal Grandmother         breast-brain    Breast Cancer Maternal Grandmother     Diabetes Maternal Grandfather     Hypertension Maternal Grandfather     Other Paternal Grandmother         narcolepsy    Eczema Daughter     Asthma Daughter     Allergic Rhinitis Daughter     Eczema Daughter     Allergic Rhinitis Daughter     Allergic Rhinitis Daughter     Allergic Rhinitis Daughter      Social History     Tobacco Use    Smoking status: Never Smoker    Smokeless tobacco: Never Used   Substance Use Topics    Alcohol use: Yes     Comment: rare       Review of Systems   Constitutional: Negative. HENT: Negative. Respiratory: Negative. Cardiovascular: Negative. Neurological: Positive for dizziness. All other systems reviewed and are negative. I affirm this is a Patient-Initiated Episode with a Patient who has not had a related appointment within my department in the past 7 days or scheduled within the next 24 hours.     Total Time: minutes: 11-20 minutes    Note: not billable if this call serves to triage the patient into an appointment for the relevant concern      Irma Valdivia MD

## 2020-06-22 ENCOUNTER — OFFICE VISIT (OUTPATIENT)
Dept: NEUROLOGY | Age: 50
End: 2020-06-22

## 2020-06-22 VITALS
RESPIRATION RATE: 18 BRPM | OXYGEN SATURATION: 97 % | HEART RATE: 63 BPM | TEMPERATURE: 97.4 F | HEIGHT: 66 IN | DIASTOLIC BLOOD PRESSURE: 90 MMHG | BODY MASS INDEX: 33.91 KG/M2 | WEIGHT: 211 LBS | SYSTOLIC BLOOD PRESSURE: 140 MMHG

## 2020-06-22 DIAGNOSIS — R42 DIZZINESS: ICD-10-CM

## 2020-06-22 DIAGNOSIS — H81.13 BENIGN PAROXYSMAL POSITIONAL VERTIGO DUE TO BILATERAL VESTIBULAR DISORDER: Primary | ICD-10-CM

## 2020-06-22 RX ORDER — MECLIZINE HYDROCHLORIDE 25 MG/1
25 TABLET ORAL
Qty: 30 TAB | Refills: 1 | Status: SHIPPED | OUTPATIENT
Start: 2020-06-22 | End: 2020-07-02

## 2020-06-22 NOTE — PROGRESS NOTES
Maribell Lselie is a 47 y.o. female . presents for Dizziness and New Patient   . Mrs Maria Guadalupe Lara is a 46 yo female patient with PMHx of HTN, DM, and vertigo came for evaluation of spinning sensation. It started about a  Month ago. She feels the room spinning at any time of the day:Mostly when she is looking down but cam have it in any position. She has associated nausea but no vomiting. Episodes are intermittent with variable frequency; has bad days, where she can have up to 15 times. Each episode lasts for about 30 sec to 1.5 min. Has no associated change in vision. No difficulty with her balance. No hearing difficulty. She initially had left side earache, started on Amoxicillin, has completed a 10 days course. She was seen by ENT and was told that her ears are fine. Had been having recurrent attacks of vertigo over the past 20 years; mostly when she has her allergies/or sinusitis. Usual attacks last for 2-3 days; but this one is prolonged. She has numbness over the left side of the face. No facial droop. No changes in speech or swallowing. She has no arm or leg weakness. No numbness over the extremities. Review of Systems   Constitutional: Negative for chills, fever, malaise/fatigue and weight loss. HENT: Negative for ear pain, hearing loss and tinnitus. Eyes: Negative for blurred vision and double vision. Respiratory: Negative for cough and shortness of breath. Cardiovascular: Negative for chest pain and leg swelling. Gastrointestinal: Positive for nausea. Negative for heartburn and vomiting. Genitourinary: Negative for dysuria, frequency and urgency. Musculoskeletal: Negative for back pain, myalgias and neck pain. Skin: Negative for itching and rash. Neurological: Positive for dizziness, tingling (left side of the face), sensory change (left side of the face) and headaches (occasionally). Negative for tremors, speech change and focal weakness.    Endo/Heme/Allergies: Does not bruise/bleed easily. Psychiatric/Behavioral: Negative for depression. The patient does not have insomnia.         Past Medical History:   Diagnosis Date    Anemia NEC //    during pregnancy    AR (allergic rhinitis) 2011    Eczema     since childhood    Gestational diabetes 1993    x1 pregnancy    Hypertension     Type 2 diabetes mellitus without complication, without long-term current use of insulin ([facility] 2016       Past Surgical History:   Procedure Laterality Date    ABDOMEN SURGERY PROC UNLISTED      Explore lap at 12 y/o    DELIVERY   [id number]    x4    HX TOTAL ABDOMINAL HYSTERECTOMY  10/2018    abd hyst with BSO; cervix was left due to scar tissue    LAP,CHOLECYSTECTOMY/EXPLORE  1986    REMOVAL GALLBLADDER          Family History   Problem Relation Age of Onset    Hypertension Mother     Cancer Mother 61        breast    Other Mother         diverticulosis    Allergic Rhinitis Mother     Breast Cancer Mother     Cancer Father 32        glands, neck    Cancer Maternal Grandmother         breast-brain    Breast Cancer Maternal Grandmother     Diabetes Maternal Grandfather     Hypertension Maternal Grandfather     Other Paternal Grandmother         narcolepsy    Eczema Daughter     Asthma Daughter     Allergic Rhinitis Daughter     Eczema Daughter     Allergic Rhinitis Daughter     Allergic Rhinitis Daughter     Allergic Rhinitis Daughter         Social History     Socioeconomic History    Marital status:      Spouse name: Not on file    Number of children: Not on file    Years of education: Not on file    Highest education level: Not on file   Occupational History    Occupation: Registered Respiratory Therapist   Social Needs    Financial resource strain: Not on file    Food insecurity     Worry: Not on file     Inability: Not on file    Transportation needs     Medical: Not on file     Non-medical: Not on file Tobacco Use    Smoking status: Never Smoker    Smokeless tobacco: Never Used   Substance and Sexual Activity    Alcohol use: Yes     Comment: rare    Drug use: Yes     Types: Prescription, OTC    Sexual activity: Yes     Partners: Male     Birth control/protection: None   Lifestyle    Physical activity     Days per week: Not on file     Minutes per session: Not on file    Stress: Not on file   Relationships    Social connections     Talks on phone: Not on file     Gets together: Not on file     Attends Yarsani service: Not on file     Active member of club or organization: Not on file     Attends meetings of clubs or organizations: Not on file     Relationship status: Not on file    Intimate partner violence     Fear of current or ex partner: Not on file     Emotionally abused: Not on file     Physically abused: Not on file     Forced sexual activity: Not on file   Other Topics Concern    Not on file   Social History Narrative    Not on file        Allergies   Allergen Reactions    Codeine Nausea Only and Other (comments)     Sweats felt like she was going to pass out    Procardia [Nifedipine] Shortness of Breath, Nausea and Vomiting and Other (comments)     Tightness in chest on 7/6/1990    Sulfa (Sulfonamide Antibiotics) Rash    Tylox [Oxycodone-Acetaminophen] Nausea Only and Other (comments)     Sweats, felt like she was going to pass out         Current Outpatient Medications   Medication Sig Dispense Refill    SITagliptin-metFORMIN (Janumet)  mg per tablet TAKE 1 TABLET BY MOUTH TWICE DAILY WITH MEALS 60 Tab 12    aliskiren (TEKTURNA) 300 mg tablet Take 1 Tab by mouth daily.  30 Tab 5    triamcinolone acetonide (KENALOG) 0.1 % topical cream APPLY A THIN LAYER TO AFFECTED AREA TWICE DAILY 80 g 3    hydroCHLOROthiazide (HYDRODIURIL) 12.5 mg tablet TAKE 1 TABLET BY MOUTH ONCE DAILY 30 Tab 12    fluticasone propionate (FLONASE) 50 mcg/actuation nasal spray USE 2 SPRAY(S) IN EACH NOSTRIL ONCE DAILY 1 Bottle 5    naproxen (NAPROSYN) 500 mg tablet TAKE 1 TABLET BY MOUTH TWICE DAILY WITH FOOD 60 Tab 5    polyethylene glycol (MIRALAX) 17 gram/dose powder Take 17 g by mouth daily. 1 tablespoon with 8 oz of water daily 510 g 12    ondansetron (ZOFRAN ODT) 8 mg disintegrating tablet Take 1 Tab by mouth every eight (8) hours as needed for Nausea. 12 Tab 3    VENTOLIN HFA 90 mcg/actuation inhaler INHALE TWO PUFFS BY MOUTH EVERY 4 HOURS AS NEEDED FOR WHEEZING 1 Inhaler 5    cyanocobalamin 1,000 mcg tablet Take 1,000 mcg by mouth daily.  Blood-Glucose Meter monitoring kit Test blood sugar once a day .00 One Touch Ultra Mini Glucose Meter 1 Kit 0    glucose blood VI test strips (ASCENSIA AUTODISC VI, ONE TOUCH ULTRA TEST VI) strip Test blood sugar once daily .00 One Touch Ultra Mini Test strip 1 Package 11    Lancets misc Use with One Touch Ultra Mini Glucose Meter test blood sugar once daily .00 1 Package 11    MULTI-VITAMIN PO Take  by mouth daily.  ascorbic acid (VITAMIN C) 500 mg tablet Take 500 mg by mouth daily.  meclizine (ANTIVERT) 12.5 mg tablet Take 1 Tab by mouth three (3) times daily as needed for Dizziness. 30 Tab 1    empagliflozin (JARDIANCE) 10 mg tablet Take 1 Tab by mouth daily. 30 Tab 5         Physical Exam  Constitutional:       Appearance: Normal appearance. HENT:      Head: Normocephalic and atraumatic. Mouth/Throat:      Mouth: Mucous membranes are moist.      Pharynx: Oropharynx is clear. No oropharyngeal exudate. Eyes:      Extraocular Movements: Extraocular movements intact. Neck:      Musculoskeletal: Normal range of motion and neck supple. No neck rigidity. Pulmonary:      Effort: Pulmonary effort is normal. No respiratory distress. Musculoskeletal: Normal range of motion. Right lower leg: No edema. Left lower leg: No edema. Neurological:      Mental Status: She is alert.       Comments: Mental status: Awake, alert, oriented x3, follows simple, complex and inverted commands, no neglect, no extinction to DSS or VSS. Speech and languge: fluent, coherent,  and comprehension intact  CN: VFF, EOMI with no nystagmus; negative head thrust test, PERRLA, decreased LT and PP over the left face;   no facial asymmetry noted, palate elevation symmetric bilat, SS+SCM 5/5 bilat, tongue midline  Motor: no pronator drift, tone normal throughout, strength 5/5 throughout  Sensory: intact to light touch and PP  throughout  Coordination: FNF, HS accurate w/o dysmetria  DTR: 2+ throughout, toes downgoing BL  Gait: normal; intact tandem. Hospital Outpatient Visit on 06/11/2020   Component Date Value Ref Range Status    [facility] SPECIMEN COL 06/11/2020 Specimens collected/[facility]    Final   Telephone on 05/27/2020   Component Date Value Ref Range Status    Triglyceride 06/11/2020 148  40 - 149 mg/dL Final    HDL Cholesterol 06/11/2020 49  >=40 mg/dL Final    Cholesterol, total 06/11/2020 248* 110 - 200 mg/dL Final    CHOLESTEROL/HDL 06/11/2020 5.1  0.0 - 5.0 Final    Non-HDL Cholesterol 06/11/2020 199* <130 mg/dL Final    LDL, calculated 06/11/2020 169* 50 - 99 mg/dL Final    VLDL, calculated 06/11/2020 30  8 - 30 mg/dL Final    LDL/HDL Ratio 06/11/2020 3.4   Final    Comment: Test includes cholesterol, HDL cholesterol, triglycerides and LDL. Cholesterol Recommended NCEP guidelines in mg/dL:  Less than 200            Desirable  200 - 239                Borderline High  Greater than or  = 240   High  Please Note:  Total Chol/HDL Ratio                   Men     Women  1/2 Avg. Risk    3.4     3.3      Avg. Risk    5.0     4.4  2X  Avg. Risk    9.6     7.1  3X  Avg.  Risk   23.4    11.0      Glucose 06/11/2020 203* 70 - 99 mg/dL Final    BUN 06/11/2020 17  6 - 22 mg/dL Final    Creatinine 06/11/2020 1.2  0.5 - 1.2 mg/dL Final    Sodium 06/11/2020 136  133 - 145 mmol/L Final    Potassium 06/11/2020 4.3  3.5 - 5.5 mmol/L Final    Chloride 06/11/2020 98  98 - 110 mmol/L Final    CO2 06/11/2020 26  20 - 32 mmol/L Final    AST (SGOT) 06/11/2020 17  10 - 37 U/L Final    ALT (SGPT) 06/11/2020 26  5 - 40 U/L Final    Alk. phosphatase 06/11/2020 152* 25 - 115 U/L Final    Bilirubin, total 06/11/2020 0.2  0.2 - 1.2 mg/dL Final    Calcium 06/11/2020 10.0  8.4 - 10.5 mg/dL Final    Protein, total 06/11/2020 7.2  6.4 - 8.3 g/dL Final    Albumin 06/11/2020 4.2  3.5 - 5.0 g/dL Final    A-G Ratio 06/11/2020 1.4  1.1 - 2.6 ratio Final    Globulin 06/11/2020 3.0  2.0 - 4.0 g/dL Final    Anion gap 06/11/2020 12.0  mmol/L Final    Comment: Test includes Albumin, Alkaline Phosphatase, ALT, AST, BUN, Calcium, CO2,  Chloride, Creatinine, Glucose, Potassium, Sodium, Total Bilirubin and Total  Protein. Estimated GFR results are reported in mL/min/1.73 sq.m. by the MDRD equation. This eGFR is validated for stable chronic renal failure patients. This   equation  is unreliable in acute illness or patients with normal renal function.  GFRAA 06/11/2020 55.5* >60.0 Final    GFRNA 06/11/2020 45.8* >60.0 Final    Hemoglobin A1c 06/11/2020 12.0* 4.8 - 5.6 % Final    AVG GLU 06/11/2020 299* 91 - 123 mg/dL Final    Creatinine, urine 06/11/2020 59  mg/dL Final    Microalbumin, urine 06/11/2020 <12.0  0.1 - 17.0 mg/L Final    Microalb/Creat ratio (ug/mg creat.) 06/11/2020    Final    ** Unable to calculate Microablumin/Creatinine Ratio due to low Microalbumin             ICD-10-CM ICD-9-CM    1. Benign paroxysmal positional vertigo due to bilateral vestibular disorder H81.13 386.11 MRI BRAIN W WO CONT      meclizine (ANTIVERT) 25 mg tablet   2. Dizziness R42 780.4 MRI BRAIN W WO CONT       A 48 yo female with the above medical problems came for evaluation of spinning sensation of 1 month duration. Patient had recurrent attacks of vertigo for a long time but most attacks last for 2-3 days. In addition, she has numbness on her left side.  Has no other focal neuro deficit. Treated for ear infection but no change in the symptom. Seen by ENT and was told that her ears are fine. Take Meclizine 12.5 mg PRN, with no marked benefit. Head thrust test is negative. Will do MRI of the rubens with/without contrast as there is additional facial numbness. In addition, have told her to increase the Meclizine to 25 mg PO PRN. Epley's instructions given. Will see her PRN in 3 months. We will call her with the MRI result.

## 2020-06-22 NOTE — PROGRESS NOTES
Keyur Espino presents today for   Chief Complaint   Patient presents with    Dizziness    New Patient       Is someone accompanying this pt? no    Is the patient using any DME equipment during OV? no    Depression Screening:  3 most recent PHQ Screens 5/28/2020   PHQ Not Done -   Little interest or pleasure in doing things Not at all   Feeling down, depressed, irritable, or hopeless Not at all   Total Score PHQ 2 0   Trouble falling or staying asleep, or sleeping too much -   Feeling tired or having little energy -   Poor appetite, weight loss, or overeating -   Feeling bad about yourself - or that you are a failure or have let yourself or your family down -   Trouble concentrating on things such as school, work, reading, or watching TV -   Moving or speaking so slowly that other people could have noticed; or the opposite being so fidgety that others notice -   Thoughts of being better off dead, or hurting yourself in some way -   PHQ 9 Score -   How difficult have these problems made it for you to do your work, take care of your home and get along with others -       Learning Assessment:  Learning Assessment 2/12/2020   PRIMARY LEARNER Patient   HIGHEST LEVEL OF EDUCATION - PRIMARY LEARNER  > 4 YEARS OF COLLEGE   BARRIERS PRIMARY LEARNER NONE   CO-LEARNER CAREGIVER No   PRIMARY LANGUAGE ENGLISH   LEARNER PREFERENCE PRIMARY LISTENING   ANSWERED BY self   RELATIONSHIP SELF       Abuse Screening:  Abuse Screening Questionnaire 2/12/2020   Do you ever feel afraid of your partner? N   Are you in a relationship with someone who physically or mentally threatens you? N   Is it safe for you to go home? Y       Fall Risk  Fall Risk Assessment, last 12 mths 2/12/2020   Able to walk? Yes   Fall in past 12 months? No         Coordination of Care:  1. Have you been to the ER, urgent care clinic since your last visit? Hospitalized since your last visit? [facility], [facility]    2.  Have you seen or consulted any other health care providers outside of the [address] [place] [address] since your last visit? Include any pap smears or colon screening.  no

## 2020-06-22 NOTE — PATIENT INSTRUCTIONS
Epley Maneuver at Home for Vertigo: Exercises Introduction Vertigo is a spinning or whirling sensation when you move your head. Your doctor may have moved you in different positions to help your vertigo get better faster. This is called the Epley maneuver. Your doctor also may have asked you to do these exercises at home. Do the exercises as often as your doctor recommends. If your vertigo is getting worse, your doctor may have you change the exercise or stop it. Step 1 Step 1 1. Sit on the edge of a bed or sofa. Step 2 1. Turn your head 45 degrees in the direction your doctor told you to. This should be toward the ear that causes the most vertigo for you. In this picture, the woman is turning toward her left ear. Step 3 1. Tilt yourself backward until you are lying on your back. Your head should still be at a 45-degree turn. Your head should be about midway between looking straight ahead and looking out to your side. Hold for 30 seconds. If you have vertigo, stay in this position until it stops. Step 4 1. Turn your head 90 degrees toward the ear that has the least vertigo. In this picture, the woman is turning to the right because she has vertigo on her left side. The point of your chin should be raised and over your shoulder. Hold for 30 seconds. Step 5 1. Roll onto the side with the least vertigo. You should now be looking at the floor. Hold for 30 seconds. Follow-up care is a key part of your treatment and safety. Be sure to make and go to all appointments, and call your doctor if you are having problems. It's also a good idea to know your test results and keep a list of the medicines you take. Where can you learn more? Go to http://samuel-kelsey.info/ Enter [phone] in the search box to learn more about \"Epley Maneuver at Home for Vertigo: Exercises. \" Current as of: November 20, 2019               Content Version: 12.5 © 9964-2342 [employer]. Care instructions adapted under license by [employer] (which disclaims liability or warranty for this information). If you have questions about a medical condition or this instruction, always ask your healthcare professional. [address] any warranty or liability for your use of this information.

## 2020-07-02 ENCOUNTER — HOSPITAL ENCOUNTER (OUTPATIENT)
Age: 50
Discharge: HOME | End: 2020-07-02
Attending: STUDENT IN AN ORGANIZED HEALTH CARE EDUCATION/TRAINING PROGRAM
Payer: COMMERCIAL

## 2020-07-02 DIAGNOSIS — H81.13 BENIGN PAROXYSMAL POSITIONAL VERTIGO DUE TO BILATERAL VESTIBULAR DISORDER: ICD-10-CM

## 2020-07-02 DIAGNOSIS — R42 DIZZINESS: ICD-10-CM

## 2020-07-02 LAB — CREAT UR-MCNC: 0.8 MG/DL (ref 0.6–1.3)

## 2020-07-02 PROCEDURE — 82565 ASSAY OF CREATININE: CPT

## 2020-07-02 PROCEDURE — 70553 MRI BRAIN STEM W/O & W/DYE: CPT

## 2020-07-02 PROCEDURE — 74011636320 HC RX REV CODE- 636/320: Performed by: STUDENT IN AN ORGANIZED HEALTH CARE EDUCATION/TRAINING PROGRAM

## 2020-07-02 PROCEDURE — A9575 INJ GADOTERATE MEGLUMI 0.1ML: HCPCS | Performed by: STUDENT IN AN ORGANIZED HEALTH CARE EDUCATION/TRAINING PROGRAM

## 2020-07-02 RX ADMIN — GADOTERATE MEGLUMINE 20 ML: 376.9 INJECTION INTRAVENOUS at 08:50

## 2020-07-27 ENCOUNTER — OFFICE VISIT (OUTPATIENT)
Dept: ORTHOPEDIC SURGERY | Age: 50
End: 2020-07-27

## 2020-07-27 VITALS
TEMPERATURE: 97.3 F | SYSTOLIC BLOOD PRESSURE: 152 MMHG | DIASTOLIC BLOOD PRESSURE: 89 MMHG | HEIGHT: 66 IN | BODY MASS INDEX: 34.39 KG/M2 | HEART RATE: 71 BPM | OXYGEN SATURATION: 100 % | WEIGHT: 214 LBS

## 2020-07-27 DIAGNOSIS — M65.321 TRIGGER INDEX FINGER OF RIGHT HAND: Primary | ICD-10-CM

## 2020-07-27 RX ORDER — TRIAMCINOLONE ACETONIDE 10 MG/ML
10 INJECTION, SUSPENSION INTRA-ARTICULAR; INTRALESIONAL ONCE
Qty: 1 VIAL | Refills: 0
Start: 2020-07-27 | End: 2020-07-27

## 2020-07-27 NOTE — PROGRESS NOTES
Maricarmen Love is a 47 y.o. female right handed individual, not currently working. Worker's Compensation and legal considerations: not known. Vitals:    07/27/20 0823   BP: 152/89   Pulse: 71   Temp: 97.3 °F (36.3 °C)   TempSrc: Temporal   SpO2: 100%   Weight: 214 lb (97.1 kg)   Height: 5' 6\" (1.676 m)   PainSc:   6   PainLoc: Hand   LMP: 10/30/2018           Chief Complaint   Patient presents with    Hand Pain     RIGHT       HPI: Patient comes in today regarding her right index finger pain and locking. At her previous visit she had right ring finger and bilateral middle finger trigger injections. She reports these have significantly helped with almost no problems. Over she reports a new problem of a right index finger trigger    Initial HPI: Bilateral finger triggering in multiple digits. Today, the Right Ring and Bilateral Middle Fingers are bothering her.     Date of onset:  indeterminate    Injury: No    Prior Treatment:  No    Numbness/ Tingling: No      ROS: Review of Systems - General ROS: negative  Psychological ROS: negative  ENT ROS: negative  Allergy and Immunology ROS: negative  Hematological and Lymphatic ROS: negative  Respiratory ROS: no cough, shortness of breath, or wheezing  Cardiovascular ROS: no chest pain or dyspnea on exertion  Gastrointestinal ROS: no abdominal pain, change in bowel habits, or black or bloody stools  Musculoskeletal ROS: positive for - pain in finger - bilateral and hand - bilateral  Neurological ROS: negative  Dermatological ROS: negative    Past Medical History:   Diagnosis Date    Anemia NEC 1987/1988/1990/1992    during pregnancy    AR (allergic rhinitis) 6/7/2011    Eczema     since childhood    Gestational diabetes 1993    x1 pregnancy    Hypertension     Type 2 diabetes mellitus without complication, without long-term current use of insulin ([facility] [address] 5/12/2016       Past Surgical History:   Procedure Laterality Date    ABDOMEN SURGERY PROC UNLISTED Explore lap at 10 y/o    DELIVERY   [id number]    x4    HX TOTAL ABDOMINAL HYSTERECTOMY  10/2018    abd hyst with BSO; cervix was left due to scar tissue    LAP,CHOLECYSTECTOMY/EXPLORE  1986    REMOVAL GALLBLADDER         Current Outpatient Medications   Medication Sig Dispense Refill    triamcinolone acetonide (Kenalog) 10 mg/mL injection 1 mL by IntraMUSCular route once for 1 dose. 1 Vial 0    SITagliptin-metFORMIN (Janumet)  mg per tablet TAKE 1 TABLET BY MOUTH TWICE DAILY WITH MEALS 60 Tab 12    aliskiren (TEKTURNA) 300 mg tablet Take 1 Tab by mouth daily. 30 Tab 5    triamcinolone acetonide (KENALOG) 0.1 % topical cream APPLY A THIN LAYER TO AFFECTED AREA TWICE DAILY 80 g 3    empagliflozin (JARDIANCE) 10 mg tablet Take 1 Tab by mouth daily. 30 Tab 5    hydroCHLOROthiazide (HYDRODIURIL) 12.5 mg tablet TAKE 1 TABLET BY MOUTH ONCE DAILY 30 Tab 12    fluticasone propionate (FLONASE) 50 mcg/actuation nasal spray USE 2 SPRAY(S) IN EACH NOSTRIL ONCE DAILY 1 Bottle 5    naproxen (NAPROSYN) 500 mg tablet TAKE 1 TABLET BY MOUTH TWICE DAILY WITH FOOD 60 Tab 5    polyethylene glycol (MIRALAX) 17 gram/dose powder Take 17 g by mouth daily. 1 tablespoon with 8 oz of water daily 510 g 12    ondansetron (ZOFRAN ODT) 8 mg disintegrating tablet Take 1 Tab by mouth every eight (8) hours as needed for Nausea. 12 Tab 3    VENTOLIN HFA 90 mcg/actuation inhaler INHALE TWO PUFFS BY MOUTH EVERY 4 HOURS AS NEEDED FOR WHEEZING 1 Inhaler 5    cyanocobalamin 1,000 mcg tablet Take 1,000 mcg by mouth daily.       Blood-Glucose Meter monitoring kit Test blood sugar once a day .00 One Touch Ultra Mini Glucose Meter 1 Kit 0    glucose blood VI test strips (ASCENSIA AUTODISC VI, ONE TOUCH ULTRA TEST VI) strip Test blood sugar once daily .00 One Touch Ultra Mini Test strip 1 Package 11    Lancets misc Use with One Touch Ultra Mini Glucose Meter test blood sugar once daily .00 1 Package 11    MULTI-VITAMIN PO Take  by mouth daily.  ascorbic acid (VITAMIN C) 500 mg tablet Take 500 mg by mouth daily. Allergies   Allergen Reactions    Codeine Nausea Only and Other (comments)     Sweats felt like she was going to pass out    Procardia [Nifedipine] Shortness of Breath, Nausea and Vomiting and Other (comments)     Tightness in chest on 7/6/1990    Sulfa (Sulfonamide Antibiotics) Rash    Tylox [Oxycodone-Acetaminophen] Nausea Only and Other (comments)     Sweats, felt like she was going to pass out           PE:     Physical Exam  Vitals signs and nursing note reviewed. Constitutional:       General: She is not in acute distress. Appearance: Normal appearance. She is not ill-appearing. Eyes:      Pupils: Pupils are equal, round, and reactive to light. Neck:      Musculoskeletal: Normal range of motion. Cardiovascular:      Pulses: Normal pulses. Pulmonary:      Effort: Pulmonary effort is normal. No respiratory distress. Abdominal:      General: Abdomen is flat. Musculoskeletal: Normal range of motion. General: Swelling and tenderness present. No deformity or signs of injury. Right lower leg: No edema. Left lower leg: No edema. Skin:     General: Skin is warm. Capillary Refill: Capillary refill takes less than 2 seconds. Findings: No bruising or erythema. Neurological:      General: No focal deficit present. Mental Status: She is alert.    Psychiatric:         Mood and Affect: Mood normal.         Behavior: Behavior normal.            Hand:    Examination L Digit(s) R Digit(s)   1st CMC Tenderness -  -    1st CMC Grind -  -    Zak Nodes -  -    Heberden Nodes -  -    A1 Pulley Tenderness -  + IF   Triggering -  + IF   UCL Instability -  -    RCL Instability -  -    Lateral Stress Pain -  -    Palmar Cords -  -    Tabletop test -  -    Garrod's Pads -  -     Strength       Pinch Strength         ROM: Full        Imaging:     None indicated        ICD-10-CM ICD-9-CM    1. Trigger index finger of right hand  M65.321 727.03 INJECT TENDON SHEATH/LIGAMENT      triamcinolone acetonide (Kenalog) 10 mg/mL injection      TRIAMCINOLONE ACETONIDE INJ         Plan:     Right index finger A1 pulley injection    Follow-up and Dispositions    · Return if symptoms worsen or fail to improve. Plan was reviewed with patient, who verbalized agreement and understanding of the plan    [address] [facility] NOTE        Chart reviewed for the following:   Ricardo SAHNI DO, have reviewed the History, Physical and updated the Allergic reactions for Nayomi [address] performed immediately prior to start of procedure:   Ricardo SAHNI DO, have performed the following reviews on [place] prior to the start of the procedure:            * Patient was identified by name and date of birth   * Agreement on procedure being performed was verified  * Risks and Benefits explained to the patient  * Procedure site verified and marked as necessary  * Patient was positioned for comfort  * Consent was signed and verified     Time: 8:35 AM      Date of procedure: 7/27/2020    Procedure performed by: Xiomara Hay DO    Provider assisted by: Rita Tanner LPN    Patient assisted by: self    How tolerated by patient: tolerated the procedure well with no complications    Post Procedural Pain Scale: 0 - No Hurt    Comments: none    Procedure:  After consent was obtained, using sterile technique the tendon was prepped. Local anesthetic used: 1% lidocaine. Kenalog 5 mg and was then injected and the needle withdrawn. The procedure was well tolerated. The patient is asked to continue to rest the area for a few more days before resuming regular activities. It may be more painful for the first 1-2 days.   Watch for fever, or increased swelling or persistent pain in the joint. Call or return to clinic prn if such symptoms occur or there is failure to improve as anticipated.

## 2020-08-01 DIAGNOSIS — I10 ESSENTIAL HYPERTENSION: ICD-10-CM

## 2020-08-03 RX ORDER — ALISKIREN 300 MG/1
TABLET, FILM COATED ORAL
Qty: 30 TAB | Refills: 5 | Status: SHIPPED | OUTPATIENT
Start: 2020-08-03 | End: 2021-01-27

## 2020-08-10 ENCOUNTER — OFFICE VISIT (OUTPATIENT)
Dept: ORTHOPEDIC SURGERY | Age: 50
End: 2020-08-10

## 2020-08-10 ENCOUNTER — TELEPHONE (OUTPATIENT)
Dept: FAMILY MEDICINE CLINIC | Age: 50
End: 2020-08-10

## 2020-08-10 VITALS
HEART RATE: 69 BPM | WEIGHT: 210 LBS | TEMPERATURE: 97.7 F | OXYGEN SATURATION: 100 % | BODY MASS INDEX: 33.75 KG/M2 | SYSTOLIC BLOOD PRESSURE: 149 MMHG | HEIGHT: 66 IN | DIASTOLIC BLOOD PRESSURE: 89 MMHG

## 2020-08-10 DIAGNOSIS — E78.2 MIXED HYPERLIPIDEMIA: ICD-10-CM

## 2020-08-10 DIAGNOSIS — E11.9 TYPE 2 DIABETES MELLITUS WITHOUT COMPLICATION, WITHOUT LONG-TERM CURRENT USE OF INSULIN (HCC): ICD-10-CM

## 2020-08-10 DIAGNOSIS — I10 ESSENTIAL HYPERTENSION: Primary | ICD-10-CM

## 2020-08-10 DIAGNOSIS — R22.32 FINGER MASS, LEFT: Primary | ICD-10-CM

## 2020-08-10 NOTE — PROGRESS NOTES
Soham Metzger is a 47 y.o. female right handed individual, not currently working. Worker's Compensation and legal considerations: not known. Vitals:    08/10/20 1026   BP: 149/89   Pulse: 69   Temp: 97.7 °F (36.5 °C)   TempSrc: Temporal   SpO2: 100%   Weight: 210 lb (95.3 kg)   Height: 5' 6\" (1.676 m)   PainSc:   2   PainLoc: Finger   LMP: 10/30/2018           Chief Complaint   Patient presents with    Hand Pain     LEFT MIDDDLE FINGER       HPI: Patient comes in today with complaints of a mass on her left middle finger. It was previously removed and biopsied by dermatology but it keeps coming back. She has been referred here for surgical removal.  She reports it hurts anytime she bumps it. Previous HPI: Patient comes in today regarding her right index finger pain and locking. At her previous visit she had right ring finger and bilateral middle finger trigger injections. She reports these have significantly helped with almost no problems. Over she reports a new problem of a right index finger trigger    Initial HPI: Bilateral finger triggering in multiple digits. Today, the Right Ring and Bilateral Middle Fingers are bothering her.     Date of onset:  indeterminate    Injury: No    Prior Treatment:  No    Numbness/ Tingling: No      ROS: Review of Systems - General ROS: negative  Psychological ROS: negative  ENT ROS: negative  Allergy and Immunology ROS: negative  Hematological and Lymphatic ROS: negative  Respiratory ROS: no cough, shortness of breath, or wheezing  Cardiovascular ROS: no chest pain or dyspnea on exertion  Gastrointestinal ROS: no abdominal pain, change in bowel habits, or black or bloody stools  Musculoskeletal ROS: positive for - pain in finger - bilateral and hand - bilateral  Neurological ROS: negative  Dermatological ROS: negative    Past Medical History:   Diagnosis Date    Anemia NEC 1987/1988/1990/1992    during pregnancy    AR (allergic rhinitis) 6/7/2011    Eczema since childhood    Gestational diabetes 1993    x1 pregnancy    Hypertension     Type 2 diabetes mellitus without complication, without long-term current use [facility] insulin [facility] 2016       Past Surgical History:   Procedure Laterality Date    ABDOMEN SURGERY PROC UNLISTED      Explore lap at 12 y/o    DELIVERY   [id number]    x4    HX TOTAL ABDOMINAL HYSTERECTOMY  10/2018    abd hyst with BSO; cervix was left due to scar tissue    LAP,CHOLECYSTECTOMY/EXPLORE  1986    REMOVAL GALLBLADDER         Current Outpatient Medications   Medication Sig Dispense Refill    aliskiren (TEKTURNA) 300 mg tablet TAKE 1 TABLET BY MOUTH EVERY DAY 30 Tab 5    SITagliptin-metFORMIN (Janumet)  mg per tablet TAKE 1 TABLET BY MOUTH TWICE DAILY WITH MEALS 60 Tab 12    triamcinolone acetonide (KENALOG) 0.1 % topical cream APPLY A THIN LAYER TO AFFECTED AREA TWICE DAILY 80 g 3    empagliflozin (JARDIANCE) 10 mg tablet Take 1 Tab by mouth daily. 30 Tab 5    hydroCHLOROthiazide (HYDRODIURIL) 12.5 mg tablet TAKE 1 TABLET BY MOUTH ONCE DAILY 30 Tab 12    fluticasone propionate (FLONASE) 50 mcg/actuation nasal spray USE 2 SPRAY(S) IN EACH NOSTRIL ONCE DAILY 1 Bottle 5    naproxen (NAPROSYN) 500 mg tablet TAKE 1 TABLET BY MOUTH TWICE DAILY WITH FOOD 60 Tab 5    polyethylene glycol (MIRALAX) 17 gram/dose powder Take 17 g by mouth daily. 1 tablespoon with 8 oz of water daily 510 g 12    ondansetron (ZOFRAN ODT) 8 mg disintegrating tablet Take 1 Tab by mouth every eight (8) hours as needed for Nausea. 12 Tab 3    VENTOLIN HFA 90 mcg/actuation inhaler INHALE TWO PUFFS BY MOUTH EVERY 4 HOURS AS NEEDED FOR WHEEZING 1 Inhaler 5    cyanocobalamin 1,000 mcg tablet Take 1,000 mcg by mouth daily.       Blood-Glucose Meter monitoring kit Test blood sugar once a day .00 One Touch Ultra Mini Glucose Meter 1 Kit 0    glucose blood VI test strips (ASCENSIA AUTODISC VI, ONE TOUCH ULTRA TEST VI) strip Test blood sugar once daily .00 One Touch Ultra Mini Test strip 1 Package 11    Lancets misc Use with One Touch Ultra Mini Glucose Meter test blood sugar once daily .00 1 Package 11    MULTI-VITAMIN PO Take  by mouth daily.  ascorbic acid (VITAMIN C) 500 mg tablet Take 500 mg by mouth daily. Allergies   Allergen Reactions    Codeine Nausea Only and Other (comments)     Sweats felt like she was going to pass out    Procardia [Nifedipine] Shortness of Breath, Nausea and Vomiting and Other (comments)     Tightness in chest on 7/6/1990    Sulfa (Sulfonamide Antibiotics) Rash    Tylox [Oxycodone-Acetaminophen] Nausea Only and Other (comments)     Sweats, felt like she was going to pass out           PE:     Physical Exam  Vitals signs and nursing note reviewed. Constitutional:       General: She is not in acute distress. Appearance: Normal appearance. She is not ill-appearing. Eyes:      Pupils: Pupils are equal, round, and reactive to light. Neck:      Musculoskeletal: Normal range of motion. Cardiovascular:      Pulses: Normal pulses. Pulmonary:      Effort: Pulmonary effort is normal. No respiratory distress. Abdominal:      General: Abdomen is flat. Musculoskeletal: Normal range of motion. General: Swelling and tenderness present. No deformity or signs of injury. Right lower leg: No edema. Left lower leg: No edema. Skin:     General: Skin is warm. Capillary Refill: Capillary refill takes less than 2 seconds. Findings: No bruising or erythema. Neurological:      General: No focal deficit present. Mental Status: She is alert. Psychiatric:         Mood and Affect: Mood normal.         Behavior: Behavior normal.            Left hand: There is an ulcerated lesion over the dorsal aspect of the DIP joint of the middle finger. Range of motion is full. There is some mild tenderness to palpation.   There is no erythema drainage or any other signs of infection. Imagin/10/2020 plain films of the left middle finger does not show any osseous lesion fracture or dislocation. ICD-10-CM ICD-9-CM    1. Finger mass, left  R22.32 782.2 AMB POC XRAY, FINGER(S), 2+ VIEWS      CANCELED: SCHEDULE SURGERY         Plan: We will plan to set up left finger mass excision today. However her hemoglobin A1c was above 12 at its most recent draw in . I had a discussion with her that she needs to get it below 8 before we can proceed with surgery. She says she plans to make an appointment with her primary care to address this. Follow-up and Dispositions    · Return if symptoms worsen or fail to improve, for Surgical discussion once hemoglobin A1c has been lowered. .          Plan was reviewed with patient, who verbalized agreement and understanding of the plan

## 2020-09-08 ENCOUNTER — VIRTUAL VISIT (OUTPATIENT)
Dept: FAMILY MEDICINE CLINIC | Age: 50
End: 2020-09-08

## 2020-09-08 DIAGNOSIS — H81.13 BENIGN PAROXYSMAL POSITIONAL VERTIGO DUE TO BILATERAL VESTIBULAR DISORDER: ICD-10-CM

## 2020-09-08 DIAGNOSIS — R22.32 FINGER MASS, LEFT: ICD-10-CM

## 2020-09-08 DIAGNOSIS — E11.9 TYPE 2 DIABETES MELLITUS WITHOUT COMPLICATION, WITHOUT LONG-TERM CURRENT USE OF INSULIN (HCC): Primary | ICD-10-CM

## 2020-09-08 DIAGNOSIS — I10 ESSENTIAL HYPERTENSION: ICD-10-CM

## 2020-09-08 RX ORDER — GLIPIZIDE 5 MG/1
5 TABLET ORAL 2 TIMES DAILY
Qty: 60 TAB | Refills: 5 | Status: SHIPPED | OUTPATIENT
Start: 2020-09-08 | End: 2021-03-02

## 2020-09-08 RX ORDER — HYDROCHLOROTHIAZIDE 25 MG/1
25 TABLET ORAL DAILY
Qty: 30 TAB | Refills: 5 | Status: SHIPPED | OUTPATIENT
Start: 2020-09-08 | End: 2021-03-02

## 2020-09-08 RX ORDER — METFORMIN HYDROCHLORIDE 1000 MG/1
1000 TABLET ORAL 2 TIMES DAILY WITH MEALS
Qty: 60 TAB | Refills: 5 | Status: SHIPPED | OUTPATIENT
Start: 2020-09-08 | End: 2021-03-02

## 2020-09-08 NOTE — PROGRESS NOTES
Tyrell Fields is a 47 y.o. female who was seen by synchronous (real-time) audio-video technology on 9/8/2020 for Diabetes        Assessment & Plan:   Diagnoses and all orders for this visit:    1. Type 2 diabetes mellitus without complication, without long-term current use of insulin (HCC)  -     metFORMIN (GLUCOPHAGE) 1,000 mg tablet; Take 1 Tab by mouth two (2) times daily (with meals). -     SITagliptin (JANUVIA) 100 mg tablet; Take 1 Tab by mouth daily.  -     glipiZIDE (GLUCOTROL) 5 mg tablet; Take 1 Tab by mouth two (2) times a day. Labs pending    2. Essential hypertension  -    increase hydroCHLOROthiazide (HYDRODIURIL) to 25 mg tablet; Take 1 Tab by mouth daily. Consider changing to chlorthalidone if inadequate response. Labs pending    3. Benign paroxysmal positional vertigo due to bilateral vestibular disorder  Resolved. F/u appt with neuro pending    4. Finger mass, left  Surgery planned after improvement of glycemic control. The complexity of medical decision making for this visit is moderate   Follow-up and Dispositions    · Return in about 4 weeks (around 10/6/2020) for diabetes, high blood pressure, medication change(s). 712  Subjective:   Patient contacted via doxy. me. Pt was not aware that her labs were ordered so she did not have them drawn. Home bp readings are slightly elevated per pt; they are usually 433-606Q systolic, 42-30L diastolic. Home bs readings are elevated. They are usually 180-200s fasting. Pt has seen hand surg for a finger mass; she needs to improve her A1c before she can proceed with surgery. Pt reports her vertigo has improved, she has been back to work and exercising again. Prior to Admission medications    Medication Sig Start Date End Date Taking? Authorizing Provider   metFORMIN (GLUCOPHAGE) 1,000 mg tablet Take 1 Tab by mouth two (2) times daily (with meals).  9/8/20  Yes Kay Petit MD   SITagliptin (JANUVIA) 100 mg tablet Take 1 Tab by mouth daily. 9/8/20  Yes Venkata Boggs MD   glipiZIDE (GLUCOTROL) 5 mg tablet Take 1 Tab by mouth two (2) times a day. 9/8/20  Yes Salome Awan MD   hydroCHLOROthiazide (HYDRODIURIL) 25 mg tablet Take 1 Tab by mouth daily. 9/8/20  Yes Salome Awan MD   aliskiren (TEKTURNA) 300 mg tablet TAKE 1 TABLET BY MOUTH EVERY DAY 8/3/20  Yes Gill Boggs MD   triamcinolone acetonide (KENALOG) 0.1 % topical cream APPLY A THIN LAYER TO AFFECTED AREA TWICE DAILY 2/12/20  Yes Venkata Boggs MD   fluticasone propionate (FLONASE) 50 mcg/actuation nasal spray USE 2 SPRAY(S) IN EACH NOSTRIL ONCE DAILY 11/8/19  Yes Venkata Boggs MD   naproxen (NAPROSYN) 500 mg tablet TAKE 1 TABLET BY MOUTH TWICE DAILY WITH FOOD 8/21/19  Yes Salome Awan MD   ondansetron (ZOFRAN ODT) 8 mg disintegrating tablet Take 1 Tab by mouth every eight (8) hours as needed for Nausea. 3/7/18  Yes Venkata Boggs MD   VENTOLIN HFA 90 mcg/actuation inhaler INHALE TWO PUFFS BY MOUTH EVERY 4 HOURS AS NEEDED FOR WHEEZING 9/5/17  Yes Gill Boggs MD   cyanocobalamin 1,000 mcg tablet Take 1,000 mcg by mouth daily. Yes Provider, Historical   glucose blood VI test strips (ASCENSIA AUTODISC VI, ONE TOUCH ULTRA TEST VI) strip Test blood sugar once daily .00 One Touch Ultra Mini Test strip 5/6/14  Yes Salome Awan MD   Lancets misc Use with One Touch Ultra Mini Glucose Meter test blood sugar once daily .00 5/6/14  Yes Venkata Boggs MD   MULTI-VITAMIN PO Take  by mouth daily. 6/7/11  Yes Provider, Historical   ascorbic acid (VITAMIN C) 500 mg tablet Take 500 mg by mouth daily. 6/7/11  Yes Provider, Historical   empagliflozin (JARDIANCE) 10 mg tablet Take 1 Tab by mouth daily. 2/12/20   Salome Awan MD   polyethylene glycol (MIRALAX) 17 gram/dose powder Take 17 g by mouth daily.  1 tablespoon with 8 oz of water daily 8/16/18   Salome Awan MD   Blood-Glucose Meter monitoring kit Test blood sugar once a day .00 One Touch Ultra Mini Glucose Meter 5/6/14   Cheryle Ren MD     Patient Active Problem List   Diagnosis Code    Eczema L30.9    AR (allergic rhinitis) J30.9    DM2 (diabetes mellitus, type 2) ([facility] E11.9    Essential hypertension I10    Mixed hyperlipidemia E78.2    Anemia, unspecified D64.9    Type 2 diabetes mellitus without complication, without long-term current use of insulin ([facility] E11.9    Allergic rhinitis J30.9    Essential hypertension with goal blood pressure less than 130/80 I10    Type 2 diabetes with nephropathy ([facility] E11.21    Severe obesity (BMI 35.0-39. 9) E66.01    DUB (dysfunctional uterine bleeding) N93.8    Fibroids D21.9    White coat syndrome with hypertension I10    Eustachian tube dysfunction, bilateral H69.83    Vertigo R42    Benign paroxysmal positional vertigo due to bilateral vestibular disorder H81.13    Finger mass, left R22.32     Current Outpatient Medications   Medication Sig Dispense Refill    metFORMIN (GLUCOPHAGE) 1,000 mg tablet Take 1 Tab by mouth two (2) times daily (with meals). 60 Tab 5    SITagliptin (JANUVIA) 100 mg tablet Take 1 Tab by mouth daily. 30 Tab 5    glipiZIDE (GLUCOTROL) 5 mg tablet Take 1 Tab by mouth two (2) times a day. 60 Tab 5    hydroCHLOROthiazide (HYDRODIURIL) 25 mg tablet Take 1 Tab by mouth daily. 30 Tab 5    aliskiren (TEKTURNA) 300 mg tablet TAKE 1 TABLET BY MOUTH EVERY DAY 30 Tab 5    triamcinolone acetonide (KENALOG) 0.1 % topical cream APPLY A THIN LAYER TO AFFECTED AREA TWICE DAILY 80 g 3    fluticasone propionate (FLONASE) 50 mcg/actuation nasal spray USE 2 SPRAY(S) IN EACH NOSTRIL ONCE DAILY 1 Bottle 5    naproxen (NAPROSYN) 500 mg tablet TAKE 1 TABLET BY MOUTH TWICE DAILY WITH FOOD 60 Tab 5    ondansetron (ZOFRAN ODT) 8 mg disintegrating tablet Take 1 Tab by mouth every eight (8) hours as needed for Nausea.  12 Tab 3    VENTOLIN HFA 90 mcg/actuation inhaler INHALE TWO PUFFS BY MOUTH EVERY 4 HOURS AS NEEDED FOR WHEEZING 1 Inhaler 5    cyanocobalamin 1,000 mcg tablet Take 1,000 mcg by mouth daily.  glucose blood VI test strips (ASCENSIA AUTODISC VI, ONE TOUCH ULTRA TEST VI) strip Test blood sugar once daily .00 One Touch Ultra Mini Test strip 1 Package 11    Lancets misc Use with One Touch Ultra Mini Glucose Meter test blood sugar once daily .00 1 Package 11    MULTI-VITAMIN PO Take  by mouth daily.  ascorbic acid (VITAMIN C) 500 mg tablet Take 500 mg by mouth daily.  empagliflozin (JARDIANCE) 10 mg tablet Take 1 Tab by mouth daily. 30 Tab 5    polyethylene glycol (MIRALAX) 17 gram/dose powder Take 17 g by mouth daily.  1 tablespoon with 8 oz of water daily 510 g 12    Blood-Glucose Meter monitoring kit Test blood sugar once a day .00 One Touch Ultra Mini Glucose Meter 1 Kit 0     Allergies   Allergen Reactions    Codeine Nausea Only and Other (comments)     Sweats felt like she was going to pass out    Procardia [Nifedipine] Shortness of Breath, Nausea and Vomiting and Other (comments)     Tightness in chest on 1990    Sulfa (Sulfonamide Antibiotics) Rash    Tylox [Oxycodone-Acetaminophen] Nausea Only and Other (comments)     Sweats, felt like she was going to pass out     Past Medical History:   Diagnosis Date    Anemia NEC ///    during pregnancy    AR (allergic rhinitis) 2011    Eczema     since childhood    Gestational diabetes 1993    x1 pregnancy    Hypertension     Type 2 diabetes mellitus without complication, without long-term current use of insulin ([facility] 2016     Past Surgical History:   Procedure Laterality Date    ABDOMEN SURGERY PROC UNLISTED      Explore lap at 10 y/o    DELIVERY   [id number]    x4    HX TOTAL ABDOMINAL HYSTERECTOMY  10/2018    abd hyst with BSO; cervix was left due to scar tissue    LAP,CHOLECYSTECTOMY/EXPLORE      REMOVAL GALLBLADDER       Family History   Problem Relation Age of Onset    Hypertension Mother    [facility] Mother 61        breast    Other Mother         diverticulosis    Allergic Rhinitis Mother     Breast Cancer Mother     Cancer Father 32        glands, neck    Cancer Maternal Grandmother         breast-brain    Breast Cancer Maternal Grandmother     Diabetes Maternal Grandfather     Hypertension Maternal Grandfather     Other Paternal Grandmother         narcolepsy    Eczema Daughter     Asthma Daughter     Allergic Rhinitis Daughter     Eczema Daughter     Allergic Rhinitis Daughter     Allergic Rhinitis Daughter     Allergic Rhinitis Daughter      Social History     Tobacco Use    Smoking status: Never Smoker    Smokeless tobacco: Never Used   Substance Use Topics    Alcohol use: Yes     Comment: rare       Review of Systems   Constitutional: Negative. HENT: Negative. Respiratory: Negative. Cardiovascular: Negative. All other systems reviewed and are negative. Objective:     Patient-Reported Vitals 9/8/2020   Patient-Reported Weight -   Patient-Reported Pulse 62   Patient-Reported Systolic  301   Patient-Reported Diastolic 78      General: alert, cooperative, no distress   Mental  status: normal mood, behavior, speech, dress, motor activity, and thought processes, able to follow commands   HENT: NCAT   Neck: no visualized mass   Resp: no respiratory distress   Neuro: no gross deficits   Skin: no discoloration or lesions of concern on visible areas   Psychiatric: normal affect, consistent with stated mood, no evidence of hallucinations     Additional exam findings: none      We discussed the expected course, resolution and complications of the diagnosis(es) in detail. Medication risks, benefits, costs, interactions, and alternatives were discussed as indicated. I advised her to contact the office if her condition worsens, changes or fails to improve as anticipated. She expressed understanding with the diagnosis(es) and plan. Soham Metzger, who was evaluated through a patient-initiated, synchronous (real-time) audio-video encounter, and/or her healthcare decision maker, is aware that it is a billable service, with coverage as determined by her insurance carrier. She provided verbal consent to proceed: n/a- consent obtained within past 12 months, and patient identification was verified. It was conducted pursuant to the emergency declaration under the [address] [place] [address] and the [employer] General Act. A caregiver was present when appropriate. Ability to conduct physical exam was limited. I was in the office. The patient was at home.       Meek Mckeon MD

## 2020-09-08 NOTE — PROGRESS NOTES
Favio Calderón presents today for   Chief Complaint   Patient presents with    Diabetes       Depression Screening:  3 most recent PHQ Screens 9/8/2020   PHQ Not Done -   Little interest or pleasure in doing things Not at all   Feeling down, depressed, irritable, or hopeless Not at all   Total Score PHQ 2 0   Trouble falling or staying asleep, or sleeping too much -   Feeling tired or having little energy -   Poor appetite, weight loss, or overeating -   Feeling bad about yourself - or that you are a failure or have let yourself or your family down -   Trouble concentrating on things such as school, work, reading, or watching TV -   Moving or speaking so slowly that other people could have noticed; or the opposite being so fidgety that others notice -   Thoughts of being better off dead, or hurting yourself in some way -   PHQ 9 Score -   How difficult have these problems made it for you to do your work, take care of your home and get along with others -       Learning Assessment:  Learning Assessment 2/12/2020   PRIMARY LEARNER Patient   HIGHEST LEVEL OF EDUCATION - PRIMARY LEARNER  > 4 YEARS OF COLLEGE   BARRIERS PRIMARY LEARNER NONE   CO-LEARNER CAREGIVER No   PRIMARY LANGUAGE ENGLISH   LEARNER PREFERENCE PRIMARY LISTENING   ANSWERED BY self   RELATIONSHIP SELF       Health Maintenance reviewed and discussed and ordered per Provider. Health Maintenance Due   Topic Date Due    Foot Exam Q1  11/15/1979    PAP AKA CERVICAL CYTOLOGY  10/16/2019    Shingrix Vaccine Age 50> (1 of 2) 11/15/2019    FOBT Q1Y Age 50-75  11/15/2019    Flu Vaccine (1) 09/01/2020    A1C test (Diabetic or Prediabetic)  09/11/2020   . Coordination of Care:  1. Have you been to the ER, urgent care clinic since your last visit? Hospitalized since your last visit? no    2. Have you seen or consulted any other health care providers outside of the [address] [place] [address] since your last visit?  Include any pap smears or colon screening. No    Patient takes all medications in the afternoon.

## 2020-09-14 ENCOUNTER — OFFICE VISIT (OUTPATIENT)
Dept: NEUROLOGY | Age: 50
End: 2020-09-14

## 2020-09-14 VITALS
RESPIRATION RATE: 18 BRPM | TEMPERATURE: 96.6 F | OXYGEN SATURATION: 97 % | WEIGHT: 217 LBS | HEART RATE: 75 BPM | DIASTOLIC BLOOD PRESSURE: 92 MMHG | BODY MASS INDEX: 34.87 KG/M2 | HEIGHT: 66 IN | SYSTOLIC BLOOD PRESSURE: 168 MMHG

## 2020-09-14 DIAGNOSIS — R42 DIZZINESS: ICD-10-CM

## 2020-09-14 DIAGNOSIS — H81.13 BENIGN PAROXYSMAL POSITIONAL VERTIGO DUE TO BILATERAL VESTIBULAR DISORDER: Primary | ICD-10-CM

## 2020-09-14 NOTE — PROGRESS NOTES
Clara Pantoja is a 47 y.o. female in office today for follow-up on benign paroxysmal positional vertigo due to bilateral vestibular disorder. 1. Have you been to the ER, urgent care clinic since your last visit? Hospitalized since your last visit? No    2. Have you seen or consulted any other health care providers outside of the [address] [place] [address] since your last visit? Include any pap smears or colon screening.  No

## 2020-09-14 NOTE — PROGRESS NOTES
Rod Davis is a 47 y.o. female . presents for Follow-up (benign paroxysmal positional vertigo due to bilateral vestibular disorder)   . A 47years old female patient with hypertension, diabetes, and vertigo here for follow-up evaluation of her spinning sensation. She was last seen in the clinic about 3 months ago. At that time, since she also complained of left leg numbness, MRI of the brain was ordered. MRI was unremarkable. Patient currently does not have any spinning sensation. No sensory or motor symptoms. No difficulty walking. No balance difficulty and no falls. From previous encounter:  Mrs John Shelley is a 48 yo female patient with PMHx of HTN, DM, and vertigo came for evaluation of spinning sensation. It started about a  Month ago. She feels the room spinning at any time of the day:Mostly when she is looking down but cam have it in any position. She has associated nausea but no vomiting. Episodes are intermittent with variable frequency; has bad days, where she can have up to 15 times. Each episode lasts for about 30 sec to 1.5 min. Has no associated change in vision. No difficulty with her balance. No hearing difficulty. She initially had left side earache, started on Amoxicillin, has completed a 10 days course. She was seen by ENT and was told that her ears are fine. Had been having recurrent attacks of vertigo over the past 20 years; mostly when she has her allergies/or sinusitis. Usual attacks last for 2-3 days; but this one is prolonged. She has numbness over the left side of the face. No facial droop. No changes in speech or swallowing. She has no arm or leg weakness. No numbness over the extremities. Review of Systems   Constitutional: Negative for chills and fever. HENT: Positive for tinnitus (sometimes). Negative for hearing loss. Eyes: Negative for blurred vision and double vision. Respiratory: Negative for cough and shortness of breath.     Cardiovascular: Negative for chest pain and leg swelling. Gastrointestinal: Negative for heartburn, nausea and vomiting. Genitourinary: Negative for dysuria and urgency. Musculoskeletal: Negative for back pain, falls and neck pain. Skin: Negative for itching and rash. Neurological: Positive for headaches (sometimes). Negative for dizziness, tremors and focal weakness. Endo/Heme/Allergies: Does not bruise/bleed easily.        Past Medical History:   Diagnosis Date    Anemia NEC ///    during pregnancy    AR (allergic rhinitis) 2011    Eczema     since childhood    Gestational diabetes 1993    x1 pregnancy    Hypertension     Type 2 diabetes mellitus without complication, without long-term current use of insulin ([facility] [address] 2016       Past Surgical History:   Procedure Laterality Date    ABDOMEN SURGERY PROC UNLISTED      Explore lap at 10 y/o    DELIVERY   [id number]    x4    HX TOTAL ABDOMINAL HYSTERECTOMY  10/2018    abd hyst with BSO; cervix was left due to scar tissue    LAP,CHOLECYSTECTOMY/EXPLORE  1986    REMOVAL GALLBLADDER          Family History   Problem Relation Age of Onset    Hypertension Mother     Cancer Mother 61        breast    Other Mother         diverticulosis    Allergic Rhinitis Mother     Breast Cancer Mother     Cancer Father 32        glands, neck    Cancer Maternal Grandmother         breast-brain    Breast Cancer Maternal Grandmother     Diabetes Maternal Grandfather     Hypertension Maternal Grandfather     Other Paternal Grandmother         narcolepsy    Eczema Daughter     Asthma Daughter     Allergic Rhinitis Daughter     Eczema Daughter     Allergic Rhinitis Daughter     Allergic Rhinitis Daughter     Allergic Rhinitis Daughter         Social History     Socioeconomic History    Marital status:      Spouse name: Not on file    Number of children: Not on file    Years of education: Not on file    Highest education level: Not on file   Occupational History    Occupation: Registered Respiratory Therapist   Social Needs    Financial resource strain: Not on file    Food insecurity     Worry: Not on file     Inability: Not on file    Transportation needs     Medical: Not on file     Non-medical: Not on file   Tobacco Use    Smoking status: Never Smoker    Smokeless tobacco: Never Used   Substance and Sexual Activity    Alcohol use: Yes     Comment: rare    Drug use: Yes     Types: Prescription, OTC    Sexual activity: Yes     Partners: Male     Birth control/protection: None   Lifestyle    Physical activity     Days per week: Not on file     Minutes per session: Not on file    Stress: Not on file   Relationships    Social connections     Talks on phone: Not on file     Gets together: Not on file     Attends Hindu service: Not on file     Active member of club or organization: Not on file     Attends meetings of clubs or organizations: Not on file     Relationship status: Not on file    Intimate partner violence     Fear of current or ex partner: Not on file     Emotionally abused: Not on file     Physically abused: Not on file     Forced sexual activity: Not on file   Other Topics Concern    Not on file   Social History Narrative    Not on file        Allergies   Allergen Reactions    Codeine Nausea Only and Other (comments)     Sweats felt like she was going to pass out    Procardia [Nifedipine] Shortness of Breath, Nausea and Vomiting and Other (comments)     Tightness in chest on 7/6/1990    Sulfa (Sulfonamide Antibiotics) Rash    Tylox [Oxycodone-Acetaminophen] Nausea Only and Other (comments)     Sweats, felt like she was going to pass out         Current Outpatient Medications   Medication Sig Dispense Refill    metFORMIN (GLUCOPHAGE) 1,000 mg tablet Take 1 Tab by mouth two (2) times daily (with meals). 60 Tab 5    SITagliptin (JANUVIA) 100 mg tablet Take 1 Tab by mouth daily.  30 Tab 5    glipiZIDE (GLUCOTROL) 5 mg tablet Take 1 Tab by mouth two (2) times a day. 60 Tab 5    hydroCHLOROthiazide (HYDRODIURIL) 25 mg tablet Take 1 Tab by mouth daily. 30 Tab 5    aliskiren (TEKTURNA) 300 mg tablet TAKE 1 TABLET BY MOUTH EVERY DAY 30 Tab 5    triamcinolone acetonide (KENALOG) 0.1 % topical cream APPLY A THIN LAYER TO AFFECTED AREA TWICE DAILY 80 g 3    fluticasone propionate (FLONASE) 50 mcg/actuation nasal spray USE 2 SPRAY(S) IN EACH NOSTRIL ONCE DAILY 1 Bottle 5    naproxen (NAPROSYN) 500 mg tablet TAKE 1 TABLET BY MOUTH TWICE DAILY WITH FOOD 60 Tab 5    ondansetron (ZOFRAN ODT) 8 mg disintegrating tablet Take 1 Tab by mouth every eight (8) hours as needed for Nausea. 12 Tab 3    VENTOLIN HFA 90 mcg/actuation inhaler INHALE TWO PUFFS BY MOUTH EVERY 4 HOURS AS NEEDED FOR WHEEZING 1 Inhaler 5    cyanocobalamin 1,000 mcg tablet Take 1,000 mcg by mouth daily.  MULTI-VITAMIN PO Take  by mouth daily.  ascorbic acid (VITAMIN C) 500 mg tablet Take 500 mg by mouth daily.  empagliflozin (JARDIANCE) 10 mg tablet Take 1 Tab by mouth daily. 30 Tab 5    polyethylene glycol (MIRALAX) 17 gram/dose powder Take 17 g by mouth daily. 1 tablespoon with 8 oz of water daily 510 g 12    Blood-Glucose Meter monitoring kit Test blood sugar once a day .00 One Touch Ultra Mini Glucose Meter 1 Kit 0    glucose blood VI test strips (ASCENSIA AUTODISC VI, ONE TOUCH ULTRA TEST VI) strip Test blood sugar once daily .00 One Touch Ultra Mini Test strip 1 Package 11    Lancets misc Use with One Touch Ultra Mini Glucose Meter test blood sugar once daily .00 1 Package 11         Physical Exam  Constitutional:       Appearance: Normal appearance. HENT:      Head: Normocephalic and atraumatic. Mouth/Throat:      Mouth: Mucous membranes are moist.      Pharynx: Oropharynx is clear. No oropharyngeal exudate. Eyes:      Extraocular Movements: Extraocular movements intact.       Pupils: Pupils are equal, round, and reactive to light. Neck:      Musculoskeletal: Normal range of motion and neck supple. Pulmonary:      Effort: Pulmonary effort is normal. No respiratory distress. Musculoskeletal: Normal range of motion. Right lower leg: No edema. Left lower leg: No edema. Neurological:      Mental Status: She is alert. Comments: Mental status: Awake, alert, oriented, follows simple and complex  commands. Speech and languge: fluent, coherent,  and comprehension intact  CN: VFF, EOMI with no nystagmus; PERRLA, decreased LT and PP over the left face;   no facial asymmetry noted, palate elevation symmetric bilat, SS+SCM 5/5 bilat, tongue midline  Motor: no pronator drift, tone normal throughout, strength 5/5 throughout  Sensory: intact to light touch and PP  throughout  Coordination: FNF, HS accurate w/o dysmetria  DTR: 2+ throughout, toes downgoing BL  Gait: normal.             Hospital Outpatient Visit on 07/02/2020   Component Date Value Ref Range Status    Creatinine, POC 07/02/2020 0.8  0.6 - 1.3 MG/DL Final    GFRAA, POC 07/02/2020 >60  >60 ml/min/1.73m2 Final    GFRNA, POC 07/02/2020 >60  >60 ml/min/1.73m2 Final    Comment: Estimated GFR is calculated using the IDMS-traceable Modification of Diet in Renal Disease (MDRD) Study equation, reported for both  Americans (GFRAA) and non- Americans (GFRNA), and normalized to 1.73m2 body surface area. The physician must decide which value applies to the patient. The MDRD study equation should only be used in individuals age 25 or older. It has not been validated for the following: pregnant women, patients with serious comorbid conditions, or on certain medications, or persons with extremes of body size, muscle mass, or nutritional status.          Study Result     EXAM: MRI BRAIN W WO CONT     CLINICAL INDICATION/HISTORY: Recurrent vertigo episodes; also with left-sided  numbness, as well as facial numbness     TECHNIQUE: Multisequence multiplanar MR imaging acquired through the brain. Includes IAC region imaging.     Contrast used: 20 cc Gadavist     COMPARISON: No prior     FINDINGS:     Parenchyma: Brain parenchyma is unremarkable. There are only a few scattered  small foci of chronic T2 and FLAIR hyperintensity. Most evident at the right  lateral frontal lobe towards subcortical region on FLAIR series 6 image 21. Diffusion imaging is normal. No acute infarction. No acute hemorrhage. No mass  lesion. No pathologic enhancement.     CSF spaces: Ventricles and cisterns remain midline in position     IAC regions: Unremarkable     Parasellar region: Unremarkable     Vasculature: Appropriate flow voids within the major skull base vasculature.     Cervicomedullary junction: Patent     Orbits: Unremarkable     Paranasal sinuses: Clear            IMPRESSION  IMPRESSION:     1. No acute intracranial hemorrhage or territorial infarct. No mass effect.   -No finding at the [facility] or within the posterior fossa to explain reported  symptoms             ICD-10-CM ICD-9-CM    1. Benign paroxysmal positional vertigo due to bilateral vestibular disorder  H81.13 386.11    2. Dizziness  R42 780.4      A 47years old female patient here for follow-up of vertigo. During the last visit she also has a left facial numbness. Symptoms have resolved currently. The last time she had vertiginous symptoms was in July 2020. MRI of the brain was unremarkable. Discussed Epley's maneuver. She has meclizine 25 mg to be taken as needed. She will call our office if any worsening of her symptoms. We will see her as needed.

## 2020-10-05 ENCOUNTER — HOSPITAL ENCOUNTER (OUTPATIENT)
Dept: LAB | Age: 50
Discharge: HOME | End: 2020-10-05

## 2020-10-05 LAB — SENTARA SPECIMEN COL,SENBCF: NORMAL

## 2020-10-05 PROCEDURE — 99001 SPECIMEN HANDLING PT-LAB: CPT

## 2020-10-06 ENCOUNTER — VIRTUAL VISIT (OUTPATIENT)
Dept: FAMILY MEDICINE CLINIC | Age: 50
End: 2020-10-06
Payer: COMMERCIAL

## 2020-10-06 DIAGNOSIS — I10 ESSENTIAL HYPERTENSION: ICD-10-CM

## 2020-10-06 DIAGNOSIS — R06.2 WHEEZING: ICD-10-CM

## 2020-10-06 DIAGNOSIS — E11.9 TYPE 2 DIABETES MELLITUS WITHOUT COMPLICATION, WITHOUT LONG-TERM CURRENT USE OF INSULIN (HCC): Primary | ICD-10-CM

## 2020-10-06 LAB
A-G RATIO,AGRAT: 1.4 RATIO (ref 1.1–2.6)
ALBUMIN SERPL-MCNC: 4.1 G/DL (ref 3.5–5)
ALP SERPL-CCNC: 136 U/L (ref 25–115)
ALT SERPL-CCNC: 28 U/L (ref 5–40)
ANION GAP SERPL CALC-SCNC: 11 MMOL/L (ref 3–15)
AST SERPL W P-5'-P-CCNC: 18 U/L (ref 10–37)
AVG GLU, 10930: 310 MG/DL (ref 91–123)
BILIRUB SERPL-MCNC: 0.2 MG/DL (ref 0.2–1.2)
BUN SERPL-MCNC: 13 MG/DL (ref 6–22)
CALCIUM SERPL-MCNC: 9.7 MG/DL (ref 8.4–10.5)
CHLORIDE SERPL-SCNC: 99 MMOL/L (ref 98–110)
CHOLEST SERPL-MCNC: 219 MG/DL (ref 110–200)
CO2 SERPL-SCNC: 28 MMOL/L (ref 20–32)
CREAT SERPL-MCNC: 0.9 MG/DL (ref 0.5–1.2)
CREATININE, URINE: 106 MG/DL
GFRAA, 66117: >60
GFRNA, 66118: >60
GLOBULIN,GLOB: 2.9 G/DL (ref 2–4)
GLUCOSE SERPL-MCNC: 127 MG/DL (ref 70–99)
HBA1C MFR BLD HPLC: 12.4 % (ref 4.8–5.6)
HDLC SERPL-MCNC: 4.9 MG/DL (ref 0–5)
HDLC SERPL-MCNC: 45 MG/DL
LDL/HDL RATIO,LDHD: 3.3
LDLC SERPL CALC-MCNC: 148 MG/DL (ref 50–99)
MICROALB/CREAT RATIO, 140286: 17.9 (ref 0–30)
MICROALBUMIN,URINE RANDOM 140054: 19 MG/L (ref 0.1–17)
NON-HDL CHOLESTEROL, 011976: 174 MG/DL
POTASSIUM SERPL-SCNC: 4 MMOL/L (ref 3.5–5.5)
PROT SERPL-MCNC: 7 G/DL (ref 6.4–8.3)
SODIUM SERPL-SCNC: 138 MMOL/L (ref 133–145)
TRIGL SERPL-MCNC: 128 MG/DL (ref 40–149)
VLDLC SERPL CALC-MCNC: 26 MG/DL (ref 8–30)

## 2020-10-06 PROCEDURE — 99214 OFFICE O/P EST MOD 30 MIN: CPT | Performed by: FAMILY MEDICINE

## 2020-10-06 RX ORDER — TACROLIMUS 1 MG/G
OINTMENT TOPICAL
COMMUNITY
Start: 2020-09-03 | End: 2021-04-21 | Stop reason: ALTCHOICE

## 2020-10-06 RX ORDER — ALBUTEROL SULFATE 90 UG/1
AEROSOL, METERED RESPIRATORY (INHALATION)
Qty: 1 INHALER | Refills: 5 | Status: SHIPPED | OUTPATIENT
Start: 2020-10-06

## 2020-10-06 NOTE — PROGRESS NOTES
Tim Renteria is a 47 y.o. female who was seen by synchronous (real-time) audio-video technology on 10/6/2020 for Diabetes (Follow up ); Hypertension (Follow up ); and Medication Evaluation (Follow up )        Assessment & Plan:   Diagnoses and all orders for this visit:    1. Type 2 diabetes mellitus without complication, without long-term current use of insulin (HCC)  Stable, cont pres tx plan. 2. Essential hypertension  Stable, cont pres tx plan. 3. Wheezing  -     albuterol (Ventolin HFA) 90 mcg/actuation inhaler; INHALE TWO PUFFS BY MOUTH EVERY 4 HOURS AS NEEDED FOR WHEEZING      The complexity of medical decision making for this visit is moderate   Follow-up and Dispositions    · Return in about 3 months (around 1/6/2021) for diabetes, high blood pressure, high cholesterol, lab review. 712  Subjective:   Patient contacted via [email or web address]. me. Pt reports that her blood sugars are around 130s fasting. She is feeling well with these readings. She is tolerating the change of meds. Her bp readings are normotensive. Pt would like to stay with the increased dose of hctz. Pt did her labs yesterday; results are not yet available. Prior to Admission medications    Medication Sig Start Date End Date Taking? Authorizing Provider   tacrolimus (PROTOPIC) 0.1 % ointment APPLY TO THE RASH TWICE DAILY AS DIRECTED 9/3/20  Yes Provider, Historical   albuterol (Ventolin HFA) 90 mcg/actuation inhaler INHALE TWO PUFFS BY MOUTH EVERY 4 HOURS AS NEEDED FOR WHEEZING 10/6/20  Yes Heather Boggs MD   metFORMIN (GLUCOPHAGE) 1,000 mg tablet Take 1 Tab by mouth two (2) times daily (with meals). 9/8/20  Yes Heather Boggs MD   SITagliptin (JANUVIA) 100 mg tablet Take 1 Tab by mouth daily. 9/8/20  Yes Heather Boggs MD   glipiZIDE (GLUCOTROL) 5 mg tablet Take 1 Tab by mouth two (2) times a day. 9/8/20  Yes Amirah Danielson MD   hydroCHLOROthiazide (HYDRODIURIL) 25 mg tablet Take 1 Tab by mouth daily. 9/8/20  Yes Arnaud Nick MD   aliskiren (TEKTURNA) 300 mg tablet TAKE 1 TABLET BY MOUTH EVERY DAY 8/3/20  Yes Gill Boggs MD   triamcinolone acetonide (KENALOG) 0.1 % topical cream APPLY A THIN LAYER TO AFFECTED AREA TWICE DAILY 2/12/20  Yes Kristin Boggs MD   fluticasone propionate (FLONASE) 50 mcg/actuation nasal spray USE 2 SPRAY(S) IN EACH NOSTRIL ONCE DAILY 11/8/19  Yes Kristin Boggs MD   naproxen (NAPROSYN) 500 mg tablet TAKE 1 TABLET BY MOUTH TWICE DAILY WITH FOOD 8/21/19  Yes Kristin Boggs MD   polyethylene glycol (MIRALAX) 17 gram/dose powder Take 17 g by mouth daily. 1 tablespoon with 8 oz of water daily 8/16/18  Yes Kristin Boggs MD   ondansetron (ZOFRAN ODT) 8 mg disintegrating tablet Take 1 Tab by mouth every eight (8) hours as needed for Nausea. 3/7/18  Yes Arnaud Nick MD   cyanocobalamin 1,000 mcg tablet Take 1,000 mcg by mouth daily. Yes Provider, Historical   Blood-Glucose Meter monitoring kit Test blood sugar once a day .00 One Touch Ultra Mini Glucose Meter 5/6/14  Yes Gill Boggs MD   glucose blood VI test strips (ASCENSIA AUTODISC VI, ONE TOUCH ULTRA TEST VI) strip Test blood sugar once daily .00 One Touch Ultra Mini Test strip 5/6/14  Yes Arnaud Nick MD   Lancets misc Use with One Touch Ultra Mini Glucose Meter test blood sugar once daily .00 5/6/14  Yes Kristin Boggs MD   MULTI-VITAMIN PO Take  by mouth daily. 6/7/11  Yes Provider, Historical   ascorbic acid (VITAMIN C) 500 mg tablet Take 500 mg by mouth daily. 6/7/11  Yes Provider, Historical   empagliflozin (JARDIANCE) 10 mg tablet Take 1 Tab by mouth daily.  2/12/20   Arnaud Nick MD     Patient Active Problem List   Diagnosis Code    Eczema L30.9    AR (allergic rhinitis) J30.9    DM2 (diabetes mellitus, type 2) ([address] E11.9    Essential hypertension I10    Mixed hyperlipidemia E78.2    Anemia, unspecified D64.9    Type 2 diabetes mellitus without complication, without long-term current use of insulin (HCC) E11.9    Allergic rhinitis J30.9    Essential hypertension with goal blood pressure less than 130/80 I10    Type 2 diabetes with nephropathy (HCC) E11.21    Severe obesity (BMI 35.0-39. 9) E66.01    DUB (dysfunctional uterine bleeding) N93.8    Fibroids D21.9    White coat syndrome with hypertension I10    Eustachian tube dysfunction, bilateral H69.83    Vertigo R42    Benign paroxysmal positional vertigo due to bilateral vestibular disorder H81.13    Finger mass, left R22.32     Current Outpatient Medications   Medication Sig Dispense Refill    tacrolimus (PROTOPIC) 0.1 % ointment APPLY TO THE RASH TWICE DAILY AS DIRECTED      albuterol (Ventolin HFA) 90 mcg/actuation inhaler INHALE TWO PUFFS BY MOUTH EVERY 4 HOURS AS NEEDED FOR WHEEZING 1 Inhaler 5    metFORMIN (GLUCOPHAGE) 1,000 mg tablet Take 1 Tab by mouth two (2) times daily (with meals). 60 Tab 5    SITagliptin (JANUVIA) 100 mg tablet Take 1 Tab by mouth daily. 30 Tab 5    glipiZIDE (GLUCOTROL) 5 mg tablet Take 1 Tab by mouth two (2) times a day. 60 Tab 5    hydroCHLOROthiazide (HYDRODIURIL) 25 mg tablet Take 1 Tab by mouth daily. 30 Tab 5    aliskiren (TEKTURNA) 300 mg tablet TAKE 1 TABLET BY MOUTH EVERY DAY 30 Tab 5    triamcinolone acetonide (KENALOG) 0.1 % topical cream APPLY A THIN LAYER TO AFFECTED AREA TWICE DAILY 80 g 3    fluticasone propionate (FLONASE) 50 mcg/actuation nasal spray USE 2 SPRAY(S) IN EACH NOSTRIL ONCE DAILY 1 Bottle 5    naproxen (NAPROSYN) 500 mg tablet TAKE 1 TABLET BY MOUTH TWICE DAILY WITH FOOD 60 Tab 5    polyethylene glycol (MIRALAX) 17 gram/dose powder Take 17 g by mouth daily. 1 tablespoon with 8 oz of water daily 510 g 12    ondansetron (ZOFRAN ODT) 8 mg disintegrating tablet Take 1 Tab by mouth every eight (8) hours as needed for Nausea. 12 Tab 3    cyanocobalamin 1,000 mcg tablet Take 1,000 mcg by mouth daily.       Blood-Glucose Meter monitoring kit Test blood sugar once a day .00 One Touch Ultra Mini Glucose Meter 1 Kit 0    glucose blood VI test strips (ASCENSIA AUTODISC VI, ONE TOUCH ULTRA TEST VI) strip Test blood sugar once daily .00 One Touch Ultra Mini Test strip 1 Package 11    Lancets misc Use with One Touch Ultra Mini Glucose Meter test blood sugar once daily .00 1 Package 11    MULTI-VITAMIN PO Take  by mouth daily.  ascorbic acid (VITAMIN C) 500 mg tablet Take 500 mg by mouth daily.  empagliflozin (JARDIANCE) 10 mg tablet Take 1 Tab by mouth daily.  30 Tab 5     Allergies   Allergen Reactions    Codeine Nausea Only and Other (comments)     Sweats felt like she was going to pass out    Procardia [Nifedipine] Shortness of Breath, Nausea and Vomiting and Other (comments)     Tightness in chest on 1990    Sulfa (Sulfonamide Antibiotics) Rash    Tylox [Oxycodone-Acetaminophen] Nausea Only and Other (comments)     Sweats, felt like she was going to pass out     Past Medical History:   Diagnosis Date    Anemia NEC ///    during pregnancy    AR (allergic rhinitis) 2011    Eczema     since childhood    Gestational diabetes 1993    x1 pregnancy    Hypertension     Type 2 diabetes mellitus without complication, without long-term current use of insulin ([facility] [address] 2016     Past Surgical History:   Procedure Laterality Date    ABDOMEN SURGERY PROC UNLISTED      Explore lap at 12 y/o    DELIVERY   [id number]    x4    HX TOTAL ABDOMINAL HYSTERECTOMY  10/2018    abd hyst with BSO; cervix was left due to scar tissue    LAP,CHOLECYSTECTOMY/EXPLORE  1986    REMOVAL GALLBLADDER       Family History   Problem Relation Age of Onset    Hypertension Mother     Cancer Mother 61        breast    Other Mother         diverticulosis    Allergic Rhinitis Mother     Breast Cancer Mother     Cancer Father 32        glands, neck    Cancer Maternal Grandmother         breast-brain    Breast Cancer Maternal Grandmother     Diabetes Maternal Grandfather     Hypertension Maternal Grandfather     Other Paternal Grandmother         narcolepsy    Eczema Daughter     Asthma Daughter     Allergic Rhinitis Daughter     Eczema Daughter     Allergic Rhinitis Daughter     Allergic Rhinitis Daughter     Allergic Rhinitis Daughter      Social History     Tobacco Use    Smoking status: Never Smoker    Smokeless tobacco: Never Used   Substance Use Topics    Alcohol use: Yes     Comment: rare       Review of Systems   Constitutional: Negative. HENT: Negative. Respiratory: Negative. Cardiovascular: Negative. All other systems reviewed and are negative. Objective:     Patient-Reported Vitals 10/6/2020   Patient-Reported Weight -   Patient-Reported Pulse 65   Patient-Reported Systolic  109   Patient-Reported Diastolic 74      General: alert, cooperative, no distress   Mental  status: normal mood, behavior, speech, dress, motor activity, and thought processes, able to follow commands   HENT: NCAT   Neck: no visualized mass   Resp: no respiratory distress   Neuro: no gross deficits   Skin: no discoloration or lesions of concern on visible areas   Psychiatric: normal affect, consistent with stated mood, no evidence of hallucinations     Additional exam findings: none      We discussed the expected course, resolution and complications of the diagnosis(es) in detail. Medication risks, benefits, costs, interactions, and alternatives were discussed as indicated. I advised her to contact the office if her condition worsens, changes or fails to improve as anticipated. She expressed understanding with the diagnosis(es) and plan. Bre Whipple, who was evaluated through a patient-initiated, synchronous (real-time) audio-video encounter, and/or her healthcare decision maker, is aware that it is a billable service, with coverage as determined by her insurance carrier.  She provided verbal consent to proceed: n/a- consent obtained within past 12 months, and patient identification was verified. It was conducted pursuant to the emergency declaration under the [address] [place] [address] and the [employer] Act. A caregiver was present when appropriate. Ability to conduct physical exam was limited. I was in the office. The patient was at home.       Ana Flanagan MD

## 2020-10-06 NOTE — PROGRESS NOTES
Gilbert Dior presents today for   Chief Complaint   Patient presents with    Diabetes     Follow up     Hypertension     Follow up     Medication Evaluation     Follow up        Is someone accompanying this pt? No    Is the patient using any DME equipment during OV? No    Depression Screening:  3 most recent PHQ Screens 9/8/2020   PHQ Not Done -   Little interest or pleasure in doing things Not at all   Feeling down, depressed, irritable, or hopeless Not at all   Total Score PHQ 2 0   Trouble falling or staying asleep, or sleeping too much -   Feeling tired or having little energy -   Poor appetite, weight loss, or overeating -   Feeling bad about yourself - or that you are a failure or have let yourself or your family down -   Trouble concentrating on things such as school, work, reading, or watching TV -   Moving or speaking so slowly that other people could have noticed; or the opposite being so fidgety that others notice -   Thoughts of being better off dead, or hurting yourself in some way -   PHQ 9 Score -   How difficult have these problems made it for you to do your work, take care of your home and get along with others -       Learning Assessment:  Learning Assessment 2/12/2020   PRIMARY LEARNER Patient   HIGHEST LEVEL OF EDUCATION - PRIMARY LEARNER  > 4 YEARS OF COLLEGE   BARRIERS PRIMARY LEARNER NONE   CO-LEARNER CAREGIVER No   PRIMARY LANGUAGE ENGLISH   LEARNER PREFERENCE PRIMARY LISTENING   ANSWERED BY self   RELATIONSHIP SELF       Abuse Screening:  Abuse Screening Questionnaire 10/6/2020   Do you ever feel afraid of your partner? N   Are you in a relationship with someone who physically or mentally threatens you? N   Is it safe for you to go home? Y         Health Maintenance Due   Topic Date Due    Foot Exam Q1  11/15/1979    Shingrix Vaccine Age 50> (1 of 2) 11/15/2019    FOBT Q1Y Age 50-75  11/15/2019    A1C test (Diabetic or Prediabetic)  09/11/2020   .       Health Maintenance reviewed and discussed and ordered per Provider. Coordination of Care  1. Have you been to the ER, urgent care clinic since your last visit? Hospitalized since your last visit? No    2. Have you seen or consulted any other health care providers outside of the [address] [place] [address] since your last visit? Include any pap smears or colon screening. No      Advance Directive:  1. Do you have an advance directive in place? Patient Reply:  No    2. If not, would you like material regarding how to put one in place?  Patient Reply: No

## 2021-01-04 ENCOUNTER — DOCUMENTATION ONLY (OUTPATIENT)
Dept: FAMILY MEDICINE CLINIC | Age: 51
End: 2021-01-04

## 2021-01-04 NOTE — PROGRESS NOTES
Patient called to verify that she didn't have any lab orders in the system. Informed patient that it didn't look like she had any placed this time. Patient voiced understanding.

## 2021-01-05 ENCOUNTER — VIRTUAL VISIT (OUTPATIENT)
Dept: FAMILY MEDICINE CLINIC | Age: 51
End: 2021-01-05
Payer: COMMERCIAL

## 2021-01-05 DIAGNOSIS — D64.9 ANEMIA, UNSPECIFIED TYPE: ICD-10-CM

## 2021-01-05 DIAGNOSIS — E78.2 MIXED HYPERLIPIDEMIA: ICD-10-CM

## 2021-01-05 DIAGNOSIS — E11.9 TYPE 2 DIABETES MELLITUS WITHOUT COMPLICATION, WITHOUT LONG-TERM CURRENT USE OF INSULIN (HCC): Primary | ICD-10-CM

## 2021-01-05 DIAGNOSIS — Z12.31 ENCOUNTER FOR SCREENING MAMMOGRAM FOR MALIGNANT NEOPLASM OF BREAST: ICD-10-CM

## 2021-01-05 DIAGNOSIS — I10 ESSENTIAL HYPERTENSION: ICD-10-CM

## 2021-01-05 DIAGNOSIS — E11.9 TYPE 2 DIABETES MELLITUS WITHOUT COMPLICATION, WITHOUT LONG-TERM CURRENT USE OF INSULIN (HCC): ICD-10-CM

## 2021-01-05 PROCEDURE — 99214 OFFICE O/P EST MOD 30 MIN: CPT | Performed by: FAMILY MEDICINE

## 2021-01-05 NOTE — PROGRESS NOTES
Zion Sher is a 45 y.o. female who was seen by synchronous (real-time) audio-video technology on 1/5/2021 for Diabetes, Hypertension, Cholesterol Problem, and Labs        Assessment & Plan:   Diagnoses and all orders for this visit:    1. Type 2 diabetes mellitus without complication, without long-term current use of insulin (HCC)  -     METABOLIC PANEL, COMPREHENSIVE; Future  -     LIPID PANEL; Future  -     HEMOGLOBIN A1C WITH EAG; Future  -     MICROALBUMIN, UR, RAND W/ MICROALB/CREAT RATIO; Future    2. Encounter for screening mammogram for malignant neoplasm of breast  -     ABRAM MAMMO BI SCREENING INCL CAD; Future    3. Essential hypertension  -     METABOLIC PANEL, COMPREHENSIVE; Future  -     LIPID PANEL; Future  Stable, cont pres tx plan. 4. Mixed hyperlipidemia  -     METABOLIC PANEL, COMPREHENSIVE; Future  -     LIPID PANEL; Future    5. Anemia, unspecified type  -     CBC WITH AUTOMATED DIFF; Future  -     IRON PROFILE; Future      The complexity of medical decision making for this visit is moderate   Follow-up and Dispositions    · Return in about 3 months (around 4/5/2021) for diabetes, high blood pressure, high cholesterol, lab review. 712  Subjective:   Patient contacted via doxy. me. Pt has been doing well. Pt has not had any further vertigo although she will get lightheaded if she does not use her flonase. she will have associated sinus pressure. Pt had a colonoscopy at the [facility]; it was nl. She is to f/u in 10 yrs. Will request a copy of the report from that ofc. Pt will be due for her mammo soon. She thinks she has an eye exam in the near future. Home bp readings have been wnl. Home bs reading was 116 today. Oct 2020 labs reviewed with pt. Prior to Admission medications    Medication Sig Start Date End Date Taking?  Authorizing Provider   tacrolimus (PROTOPIC) 0.1 % ointment APPLY TO THE RASH TWICE DAILY AS DIRECTED 9/3/20   Provider, Historical   albuterol (Ventolin HFA) 90 mcg/actuation inhaler INHALE TWO PUFFS BY MOUTH EVERY 4 HOURS AS NEEDED FOR WHEEZING 10/6/20   Jadyn Clemens MD   metFORMIN (GLUCOPHAGE) 1,000 mg tablet Take 1 Tab by mouth two (2) times daily (with meals). 9/8/20   Jadyn Clemens MD   SITagliptin (JANUVIA) 100 mg tablet Take 1 Tab by mouth daily. 9/8/20   Jadyn Clemens MD   glipiZIDE (GLUCOTROL) 5 mg tablet Take 1 Tab by mouth two (2) times a day. 9/8/20   Jadyn Clemens MD   hydroCHLOROthiazide (HYDRODIURIL) 25 mg tablet Take 1 Tab by mouth daily. 9/8/20   Jadyn Clemens MD   aliskiren (TEKTURNA) 300 mg tablet TAKE 1 TABLET BY MOUTH EVERY DAY 8/3/20   Jadyn Clemens MD   triamcinolone acetonide (KENALOG) 0.1 % topical cream APPLY A THIN LAYER TO AFFECTED AREA TWICE DAILY 2/12/20   Jadyn Clemens MD   empagliflozin (JARDIANCE) 10 mg tablet Take 1 Tab by mouth daily. 2/12/20   Jadyn Clemens MD   fluticasone propionate (FLONASE) 50 mcg/actuation nasal spray USE 2 SPRAY(S) IN EACH NOSTRIL ONCE DAILY 11/8/19   Jadyn Clemens MD   naproxen (NAPROSYN) 500 mg tablet TAKE 1 TABLET BY MOUTH TWICE DAILY WITH FOOD 8/21/19   Jadyn Clemens MD   polyethylene glycol (MIRALAX) 17 gram/dose powder Take 17 g by mouth daily. 1 tablespoon with 8 oz of water daily 8/16/18   Jadyn Clemens MD   ondansetron (ZOFRAN ODT) 8 mg disintegrating tablet Take 1 Tab by mouth every eight (8) hours as needed for Nausea. 3/7/18   Jadyn Clemens MD   cyanocobalamin 1,000 mcg tablet Take 1,000 mcg by mouth daily.     Provider, Historical   Blood-Glucose Meter monitoring kit Test blood sugar once a day .00 One Touch Ultra Mini Glucose Meter 5/6/14   Adi Boggs MD   glucose blood VI test strips (ASCENSIA AUTODISC VI, ONE TOUCH ULTRA TEST VI) strip Test blood sugar once daily .00 One Touch Ultra Mini Test strip 5/6/14   Jadyn Clemens MD   Lancets misc Use with One Touch Ultra Mini Glucose Meter test blood sugar once daily .00 5/6/14   Gigi Franklin MD   MULTI-VITAMIN PO Take  by mouth daily. 6/7/11   Provider, Historical   ascorbic acid (VITAMIN C) 500 mg tablet Take 500 mg by mouth daily. 6/7/11   Provider, Historical     Patient Active Problem List   Diagnosis Code    Eczema L30.9    AR (allergic rhinitis) J30.9    DM2 (diabetes mellitus, type 2) ([facility] [address] E11.9    Essential hypertension I10    Mixed hyperlipidemia E78.2    Anemia, unspecified D64.9    Type 2 diabetes mellitus without complication, without long-term current use of insulin ([facility] [address] E11.9    Allergic rhinitis J30.9    Essential hypertension with goal blood pressure less than 130/80 I10    Type 2 diabetes with nephropathy (HCC) E11.21    Severe obesity (BMI 35.0-39. 9) E66.01    DUB (dysfunctional uterine bleeding) N93.8    Fibroids D21.9    White coat syndrome with hypertension I10    Eustachian tube dysfunction, bilateral H69.83    Vertigo R42    Benign paroxysmal positional vertigo due to bilateral vestibular disorder H81.13    Finger mass, left R22.32     Current Outpatient Medications   Medication Sig Dispense Refill    tacrolimus (PROTOPIC) 0.1 % ointment APPLY TO THE RASH TWICE DAILY AS DIRECTED      albuterol (Ventolin HFA) 90 mcg/actuation inhaler INHALE TWO PUFFS BY MOUTH EVERY 4 HOURS AS NEEDED FOR WHEEZING 1 Inhaler 5    metFORMIN (GLUCOPHAGE) 1,000 mg tablet Take 1 Tab by mouth two (2) times daily (with meals). 60 Tab 5    SITagliptin (JANUVIA) 100 mg tablet Take 1 Tab by mouth daily. 30 Tab 5    glipiZIDE (GLUCOTROL) 5 mg tablet Take 1 Tab by mouth two (2) times a day. 60 Tab 5    hydroCHLOROthiazide (HYDRODIURIL) 25 mg tablet Take 1 Tab by mouth daily. 30 Tab 5    aliskiren (TEKTURNA) 300 mg tablet TAKE 1 TABLET BY MOUTH EVERY DAY 30 Tab 5    triamcinolone acetonide (KENALOG) 0.1 % topical cream APPLY A THIN LAYER TO AFFECTED AREA TWICE DAILY 80 g 3    empagliflozin (JARDIANCE) 10 mg tablet Take 1 Tab by mouth daily.  27 Tab 5    fluticasone propionate (FLONASE) 50 mcg/actuation nasal spray USE 2 SPRAY(S) IN EACH NOSTRIL ONCE DAILY 1 Bottle 5    naproxen (NAPROSYN) 500 mg tablet TAKE 1 TABLET BY MOUTH TWICE DAILY WITH FOOD 60 Tab 5    polyethylene glycol (MIRALAX) 17 gram/dose powder Take 17 g by mouth daily. 1 tablespoon with 8 oz of water daily 510 g 12    ondansetron (ZOFRAN ODT) 8 mg disintegrating tablet Take 1 Tab by mouth every eight (8) hours as needed for Nausea. 12 Tab 3    cyanocobalamin 1,000 mcg tablet Take 1,000 mcg by mouth daily.  Blood-Glucose Meter monitoring kit Test blood sugar once a day .00 One Touch Ultra Mini Glucose Meter 1 Kit 0    glucose blood VI test strips (ASCENSIA AUTODISC VI, ONE TOUCH ULTRA TEST VI) strip Test blood sugar once daily .00 One Touch Ultra Mini Test strip 1 Package 11    Lancets misc Use with One Touch Ultra Mini Glucose Meter test blood sugar once daily .00 1 Package 11    MULTI-VITAMIN PO Take  by mouth daily.  ascorbic acid (VITAMIN C) 500 mg tablet Take 500 mg by mouth daily.        Allergies   Allergen Reactions    Codeine Nausea Only and Other (comments)     Sweats felt like she was going to pass out    Procardia [Nifedipine] Shortness of Breath, Nausea and Vomiting and Other (comments)     Tightness in chest on 1990    Sulfa (Sulfonamide Antibiotics) Rash    Tylox [Oxycodone-Acetaminophen] Nausea Only and Other (comments)     Sweats, felt like she was going to pass out     Past Medical History:   Diagnosis Date    Anemia NEC ///    during pregnancy    AR (allergic rhinitis) 2011    Eczema     since childhood    Gestational diabetes 1993    x1 pregnancy    Hypertension     Type 2 diabetes mellitus without complication, without long-term current use of insulin ([facility] [address] 2016     Past Surgical History:   Procedure Laterality Date    DELIVERY   [id number]    x4    HX TOTAL ABDOMINAL HYSTERECTOMY 10/2018    abd hyst with BSO; cervix was left due to scar tissue    UT ABDOMEN SURGERY PROC UNLISTED      Explore lap at 12 y/o    UT LAP,CHOLECYSTECTOMY/EXPLORE  1986    UT REMOVAL GALLBLADDER  2002     Family History   Problem Relation Age of Onset    Hypertension Mother     Cancer Mother 61        breast    Other Mother         diverticulosis    Allergic Rhinitis Mother     Breast Cancer Mother     Cancer Father 32        glands, neck    Cancer Maternal Grandmother         breast-brain    Breast Cancer Maternal Grandmother     Diabetes Maternal Grandfather     Hypertension Maternal Grandfather     Other Paternal Grandmother         narcolepsy    Eczema Daughter     Asthma Daughter     Allergic Rhinitis Daughter     Eczema Daughter     Allergic Rhinitis Daughter     Allergic Rhinitis Daughter     Allergic Rhinitis Daughter      Social History     Tobacco Use    Smoking status: Never Smoker    Smokeless tobacco: Never Used   Substance Use Topics    Alcohol use: Yes     Comment: rare       Review of Systems   Constitutional: Negative. HENT: Negative. Respiratory: Negative. Cardiovascular: Negative. All other systems reviewed and are negative. Objective:     Patient-Reported Vitals 10/6/2020   Patient-Reported Weight -   Patient-Reported Pulse 65   Patient-Reported Systolic  347   Patient-Reported Diastolic 74      General: alert, cooperative, no distress   Mental  status: normal mood, behavior, speech, dress, motor activity, and thought processes, able to follow commands   HENT: NCAT   Neck: no visualized mass   Resp: no respiratory distress   Neuro: no gross deficits   Skin: no discoloration or lesions of concern on visible areas   Psychiatric: normal affect, consistent with stated mood, no evidence of hallucinations     Additional exam findings: none      We discussed the expected course, resolution and complications of the diagnosis(es) in detail.   Medication risks, benefits, costs, interactions, and alternatives were discussed as indicated. I advised her to contact the office if her condition worsens, changes or fails to improve as anticipated. She expressed understanding with the diagnosis(es) and plan. Alessandro Schmid, who was evaluated through a patient-initiated, synchronous (real-time) audio-video encounter, and/or her healthcare decision maker, is aware that it is a billable service, with coverage as determined by her insurance carrier. She provided verbal consent to proceed: n/a- consent obtained within past 12 months, and patient identification was verified. It was conducted pursuant to the emergency declaration under the [address] [place] [address], [address] [place] [address] authority and the [employer] General Act. A caregiver was present when appropriate. Ability to conduct physical exam was limited. I was in the office. The patient was at home.       Rivera Zarate MD

## 2021-01-05 NOTE — PROGRESS NOTES
Cuca Sweet presents today for   Chief Complaint   Patient presents with    Diabetes    Hypertension    Cholesterol Problem    Labs       Virtual/telephone visit    Depression Screening:  3 most recent PHQ Screens 1/5/2021   PHQ Not Done -   Little interest or pleasure in doing things Not at all   Feeling down, depressed, irritable, or hopeless Not at all   Total Score PHQ 2 0   Trouble falling or staying asleep, or sleeping too much -   Feeling tired or having little energy -   Poor appetite, weight loss, or overeating -   Feeling bad about yourself - or that you are a failure or have let yourself or your family down -   Trouble concentrating on things such as school, work, reading, or watching TV -   Moving or speaking so slowly that other people could have noticed; or the opposite being so fidgety that others notice -   Thoughts of being better off dead, or hurting yourself in some way -   PHQ 9 Score -   How difficult have these problems made it for you to do your work, take care of your home and get along with others -       Learning Assessment:  Learning Assessment 2/12/2020   PRIMARY LEARNER Patient   HIGHEST LEVEL OF EDUCATION - PRIMARY LEARNER  > 4 YEARS OF COLLEGE   BARRIERS PRIMARY LEARNER NONE   CO-LEARNER CAREGIVER No   PRIMARY LANGUAGE ENGLISH   LEARNER PREFERENCE PRIMARY LISTENING   ANSWERED BY self   RELATIONSHIP SELF       Travel Screening:   Travel Screening     Question   Response    In the last month, have you been in contact with someone who was confirmed or suspected to have Coronavirus / COVID-19? No / Unsure    Have you had a COVID-19 viral test in the last 14 days? No    Do you have any of the following new or worsening symptoms? None of these    Have you traveled internationally in the last month? No      Travel History   Travel since 12/05/20     No documented travel since 12/05/20          Health Maintenance reviewed and discussed and ordered per Provider.     Health Maintenance Due   Topic Date Due    Foot Exam Q1  11/15/1979    Colorectal Cancer Screening Combo  11/15/2019    A1C test (Diabetic or Prediabetic)  01/05/2021    Breast Cancer Screen Mammogram  01/07/2021   . Coordination of Care:  1. Have you been to the ER, urgent care clinic since your last visit? Hospitalized since your last visit? No    2. Have you seen or consulted any other health care providers outside of the [address] [place] [address] since your last visit? Include any pap smears or colon screening.  No

## 2021-01-06 ENCOUNTER — HOSPITAL ENCOUNTER (OUTPATIENT)
Dept: LAB | Age: 51
Discharge: HOME | End: 2021-01-06

## 2021-01-06 LAB — SENTARA SPECIMEN COL,SENBCF: NORMAL

## 2021-01-06 PROCEDURE — 99001 SPECIMEN HANDLING PT-LAB: CPT

## 2021-01-07 LAB
A-G RATIO,AGRAT: 1.3 RATIO (ref 1.1–2.6)
ABSOLUTE LYMPHOCYTE COUNT, 10803: 2.4 K/UL (ref 1–4.8)
ALBUMIN SERPL-MCNC: 4.4 G/DL (ref 3.5–5)
ALP SERPL-CCNC: 151 U/L (ref 25–115)
ALT SERPL-CCNC: 36 U/L (ref 5–40)
ANION GAP SERPL CALC-SCNC: 14.6 MMOL/L (ref 3–15)
AST SERPL W P-5'-P-CCNC: 24 U/L (ref 10–37)
AVG GLU, 10930: 209 MG/DL (ref 91–123)
BASOPHILS # BLD: 0 K/UL (ref 0–0.2)
BASOPHILS NFR BLD: 1 % (ref 0–2)
BILIRUB SERPL-MCNC: 0.2 MG/DL (ref 0.2–1.2)
BUN SERPL-MCNC: 10 MG/DL (ref 6–22)
CALCIUM SERPL-MCNC: 10.1 MG/DL (ref 8.4–10.5)
CHLORIDE SERPL-SCNC: 96 MMOL/L (ref 98–110)
CHOLEST SERPL-MCNC: 206 MG/DL (ref 110–200)
CO2 SERPL-SCNC: 26 MMOL/L (ref 20–32)
CREAT SERPL-MCNC: 0.8 MG/DL (ref 0.5–1.2)
CREATININE, URINE: 128 MG/DL
EOSINOPHIL # BLD: 0.1 K/UL (ref 0–0.5)
EOSINOPHIL NFR BLD: 1 % (ref 0–6)
ERYTHROCYTE [DISTWIDTH] IN BLOOD BY AUTOMATED COUNT: 16.7 % (ref 10–15.5)
FE % SATURATION,PSAT: 11 % (ref 20–50)
GFRAA, 66117: >60
GFRNA, 66118: >60
GLOBULIN,GLOB: 3.3 G/DL (ref 2–4)
GLUCOSE SERPL-MCNC: 169 MG/DL (ref 70–99)
GRANULOCYTES,GRANS: 59 % (ref 40–75)
HBA1C MFR BLD HPLC: 8.9 % (ref 4.8–5.6)
HCT VFR BLD AUTO: 39.2 % (ref 35.1–48)
HDLC SERPL-MCNC: 4.9 MG/DL (ref 0–5)
HDLC SERPL-MCNC: 42 MG/DL
HGB BLD-MCNC: 11.3 G/DL (ref 11.7–16)
IRON,IRN: 33 MCG/DL (ref 30–160)
LDL/HDL RATIO,LDHD: 3.1
LDLC SERPL CALC-MCNC: 130 MG/DL (ref 50–99)
LYMPHOCYTES, LYMLT: 32 % (ref 20–45)
MCH RBC QN AUTO: 25 PG (ref 26–34)
MCHC RBC AUTO-ENTMCNC: 29 G/DL (ref 31–36)
MCV RBC AUTO: 86 FL (ref 81–99)
MICROALB/CREAT RATIO, 140286: 45.3 (ref 0–30)
MICROALBUMIN,URINE RANDOM 140054: 58 MG/L (ref 0.1–17)
MONOCYTES # BLD: 0.6 K/UL (ref 0.1–1)
MONOCYTES NFR BLD: 8 % (ref 3–12)
NEUTROPHILS # BLD AUTO: 4.4 K/UL (ref 1.8–7.7)
NON-HDL CHOLESTEROL, 011976: 164 MG/DL
PLATELET # BLD AUTO: 356 K/UL (ref 140–440)
PMV BLD AUTO: 12 FL (ref 9–13)
POTASSIUM SERPL-SCNC: 4.3 MMOL/L (ref 3.5–5.5)
PROT SERPL-MCNC: 7.7 G/DL (ref 6.4–8.3)
RBC # BLD AUTO: 4.57 M/UL (ref 3.8–5.2)
SODIUM SERPL-SCNC: 137 MMOL/L (ref 133–145)
TIBC,TIBC: 305 MCG/DL (ref 228–428)
TRIGL SERPL-MCNC: 171 MG/DL (ref 40–149)
UIBC SERPL-MCNC: 272 MCG/DL (ref 110–370)
VLDLC SERPL CALC-MCNC: 34 MG/DL (ref 8–30)
WBC # BLD AUTO: 7.5 K/UL (ref 4–11)

## 2021-01-08 DIAGNOSIS — E11.9 TYPE 2 DIABETES MELLITUS WITHOUT COMPLICATION, WITHOUT LONG-TERM CURRENT USE OF INSULIN (HCC): Primary | ICD-10-CM

## 2021-01-08 DIAGNOSIS — E78.2 MIXED HYPERLIPIDEMIA: ICD-10-CM

## 2021-01-08 DIAGNOSIS — D64.9 ANEMIA, UNSPECIFIED TYPE: ICD-10-CM

## 2021-01-08 DIAGNOSIS — I10 ESSENTIAL HYPERTENSION: ICD-10-CM

## 2021-01-08 DIAGNOSIS — E11.9 TYPE 2 DIABETES MELLITUS WITHOUT COMPLICATION, WITHOUT LONG-TERM CURRENT USE OF INSULIN (HCC): ICD-10-CM

## 2021-01-27 DIAGNOSIS — I10 ESSENTIAL HYPERTENSION: ICD-10-CM

## 2021-01-27 RX ORDER — ALISKIREN 300 MG/1
TABLET, FILM COATED ORAL
Qty: 30 TAB | Refills: 5 | Status: SHIPPED | OUTPATIENT
Start: 2021-01-27 | End: 2021-08-05

## 2021-03-02 DIAGNOSIS — I10 ESSENTIAL HYPERTENSION: ICD-10-CM

## 2021-03-02 DIAGNOSIS — E11.9 TYPE 2 DIABETES MELLITUS WITHOUT COMPLICATION, WITHOUT LONG-TERM CURRENT USE OF INSULIN (HCC): ICD-10-CM

## 2021-03-02 RX ORDER — HYDROCHLOROTHIAZIDE 25 MG/1
TABLET ORAL
Qty: 30 TAB | Refills: 5 | Status: SHIPPED | OUTPATIENT
Start: 2021-03-02 | End: 2021-08-27

## 2021-03-02 RX ORDER — METFORMIN HYDROCHLORIDE 1000 MG/1
TABLET ORAL
Qty: 60 TAB | Refills: 5 | Status: SHIPPED | OUTPATIENT
Start: 2021-03-02 | End: 2021-08-27

## 2021-03-02 RX ORDER — GLIPIZIDE 5 MG/1
TABLET ORAL
Qty: 60 TAB | Refills: 5 | Status: SHIPPED | OUTPATIENT
Start: 2021-03-02 | End: 2021-08-27

## 2021-03-02 RX ORDER — SITAGLIPTIN 100 MG/1
TABLET, FILM COATED ORAL
Qty: 30 TAB | Refills: 5 | Status: SHIPPED | OUTPATIENT
Start: 2021-03-02 | End: 2021-08-27

## 2021-03-12 ENCOUNTER — HOSPITAL ENCOUNTER (OUTPATIENT)
Dept: MAMMOGRAPHY | Age: 51
Discharge: HOME | End: 2021-03-12
Attending: FAMILY MEDICINE
Payer: COMMERCIAL

## 2021-03-12 PROCEDURE — 77067 SCR MAMMO BI INCL CAD: CPT

## 2021-04-21 ENCOUNTER — VIRTUAL VISIT (OUTPATIENT)
Dept: FAMILY MEDICINE CLINIC | Age: 51
End: 2021-04-21
Payer: COMMERCIAL

## 2021-04-21 DIAGNOSIS — E78.2 MIXED HYPERLIPIDEMIA: ICD-10-CM

## 2021-04-21 DIAGNOSIS — E11.9 TYPE 2 DIABETES MELLITUS WITHOUT COMPLICATION, WITHOUT LONG-TERM CURRENT USE OF INSULIN (HCC): Primary | ICD-10-CM

## 2021-04-21 DIAGNOSIS — M25.50 ARTHRALGIA, UNSPECIFIED JOINT: ICD-10-CM

## 2021-04-21 DIAGNOSIS — I10 ESSENTIAL HYPERTENSION: ICD-10-CM

## 2021-04-21 PROCEDURE — 99214 OFFICE O/P EST MOD 30 MIN: CPT | Performed by: FAMILY MEDICINE

## 2021-04-21 PROCEDURE — 3052F HG A1C>EQUAL 8.0%<EQUAL 9.0%: CPT | Performed by: FAMILY MEDICINE

## 2021-04-21 RX ORDER — NAPROXEN 500 MG/1
TABLET ORAL
Qty: 60 TAB | Refills: 5 | Status: SHIPPED | OUTPATIENT
Start: 2021-04-21 | End: 2021-10-29

## 2021-04-21 RX ORDER — EPINASTINE HYDROCHLORIDE 0.5 MG/ML
SOLUTION/ DROPS OPHTHALMIC
COMMUNITY
Start: 2021-04-01 | End: 2022-01-23

## 2021-04-21 NOTE — PROGRESS NOTES
Molly Marc is a 45 y.o. female who was seen by synchronous (real-time) audio-video technology on 4/21/2021 for Diabetes, Hypertension, Cholesterol Problem, and Labs (1/6/21)        Assessment & Plan:   Diagnoses and all orders for this visit:    1. Type 2 diabetes mellitus without complication, without long-term current use of insulin (HCC)  Improving; cont pres tx plan    2. Essential hypertension  Stable, cont pres tx plan. 3. Mixed hyperlipidemia  Work on diet. Discussed statin. Pt declines for now    4. Arthralgia, unspecified joint  -     naproxen (NAPROSYN) 500 mg tablet; TAKE 1 TABLET BY MOUTH TWICE DAILY WITH FOOD      The complexity of medical decision making for this visit is moderate   Follow-up and Dispositions    · Return in about 3 months (around 7/21/2021) for diabetes, high blood pressure, high cholesterol. 712  Subjective:   Patient contacted via doxy. me. Pt has been doing well. Recent labs reviewed in detail. Home bp readings have been normotensive. Am BS readings are better also. Pt has been exercising regularly. Pt is not planning to get the covid vaccination. Pt had to discontinue her allergy shots. She was not tolerating the dose escalation. She was having more reactions each time. Prior to Admission medications    Medication Sig Start Date End Date Taking?  Authorizing Provider   epinastine 0.05 % drop INSTILL 1 DROP INTO EACH EYE TWICE DAILY 4/1/21  Yes Provider, Historical   naproxen (NAPROSYN) 500 mg tablet TAKE 1 TABLET BY MOUTH TWICE DAILY WITH FOOD 4/21/21  Yes Gill Boggs MD   hydroCHLOROthiazide (HYDRODIURIL) 25 mg tablet TAKE 1 TABLET BY MOUTH EVERY DAY 3/2/21  Yes Gill Boggs MD   metFORMIN (GLUCOPHAGE) 1,000 mg tablet TAKE 1 TABLET BY MOUTH TWICE A DAY WITH MEALS 3/2/21  Yes Jeromy Boggs MD   Januvia 100 mg tablet TAKE 1 TABLET BY MOUTH EVERY DAY 3/2/21  Yes Jeromy Boggs MD   glipiZIDE (GLUCOTROL) 5 mg tablet TAKE 1 TABLET BY MOUTH TWICE A DAY 3/2/21  Yes Patrick Reyes MD   aliskiren (TEKTURNA) 300 mg tablet TAKE 1 TABLET BY MOUTH EVERY DAY 1/27/21  Yes Patrick Reyes MD   fluticasone propionate (FLONASE) 50 mcg/actuation nasal spray Use 2 spray(s) in each nostril once daily 1/6/21  Yes Erika Boggs MD   albuterol (Ventolin HFA) 90 mcg/actuation inhaler INHALE TWO PUFFS BY MOUTH EVERY 4 HOURS AS NEEDED FOR WHEEZING 10/6/20  Yes Gill Boggs MD   triamcinolone acetonide (KENALOG) 0.1 % topical cream APPLY A THIN LAYER TO AFFECTED AREA TWICE DAILY 2/12/20  Yes Patrick Reyes MD   ondansetron (ZOFRAN ODT) 8 mg disintegrating tablet Take 1 Tab by mouth every eight (8) hours as needed for Nausea. 3/7/18  Yes Patrick Reyes MD   cyanocobalamin 1,000 mcg tablet Take 1,000 mcg by mouth daily. Yes Provider, Historical   Blood-Glucose Meter monitoring kit Test blood sugar once a day .00 One Touch Ultra Mini Glucose Meter 5/6/14  Yes Gill Boggs MD   glucose blood VI test strips (ASCENSIA AUTODISC VI, ONE TOUCH ULTRA TEST VI) strip Test blood sugar once daily .00 One Touch Ultra Mini Test strip 5/6/14  Yes Patrick Reyes MD   Lancets misc Use with One Touch Ultra Mini Glucose Meter test blood sugar once daily .00 5/6/14  Yes Erika Boggs MD   MULTI-VITAMIN PO Take  by mouth daily. 6/7/11  Yes Provider, Historical   ascorbic acid (VITAMIN C) 500 mg tablet Take 500 mg by mouth daily.  6/7/11  Yes Provider, Historical     Patient Active Problem List   Diagnosis Code    Eczema L30.9    AR (allergic rhinitis) J30.9    DM2 (diabetes mellitus, type 2) ([facility] [address] E11.9    Essential hypertension I10    Mixed hyperlipidemia E78.2    Anemia, unspecified D64.9    Type 2 diabetes mellitus without complication, without long-term current use of insulin ([facility]) E11.9    Allergic rhinitis J30.9    Essential hypertension with goal blood pressure less than 130/80 I10    Type 2 diabetes with nephropathy (HCC) E11.21    Severe obesity (BMI 35.0-39. 9) E66.01    DUB (dysfunctional uterine bleeding) N93.8    Fibroids D21.9    White coat syndrome with hypertension I10    Eustachian tube dysfunction, bilateral H69.83    Vertigo R42    Benign paroxysmal positional vertigo due to bilateral vestibular disorder H81.13    Finger mass, left R22.32     Current Outpatient Medications   Medication Sig Dispense Refill    epinastine 0.05 % drop INSTILL 1 DROP INTO EACH EYE TWICE DAILY      naproxen (NAPROSYN) 500 mg tablet TAKE 1 TABLET BY MOUTH TWICE DAILY WITH FOOD 60 Tab 5    hydroCHLOROthiazide (HYDRODIURIL) 25 mg tablet TAKE 1 TABLET BY MOUTH EVERY DAY 30 Tab 5    metFORMIN (GLUCOPHAGE) 1,000 mg tablet TAKE 1 TABLET BY MOUTH TWICE A DAY WITH MEALS 60 Tab 5    Januvia 100 mg tablet TAKE 1 TABLET BY MOUTH EVERY DAY 30 Tab 5    glipiZIDE (GLUCOTROL) 5 mg tablet TAKE 1 TABLET BY MOUTH TWICE A DAY 60 Tab 5    aliskiren (TEKTURNA) 300 mg tablet TAKE 1 TABLET BY MOUTH EVERY DAY 30 Tab 5    fluticasone propionate (FLONASE) 50 mcg/actuation nasal spray Use 2 spray(s) in each nostril once daily 16 g 12    albuterol (Ventolin HFA) 90 mcg/actuation inhaler INHALE TWO PUFFS BY MOUTH EVERY 4 HOURS AS NEEDED FOR WHEEZING 1 Inhaler 5    triamcinolone acetonide (KENALOG) 0.1 % topical cream APPLY A THIN LAYER TO AFFECTED AREA TWICE DAILY 80 g 3    ondansetron (ZOFRAN ODT) 8 mg disintegrating tablet Take 1 Tab by mouth every eight (8) hours as needed for Nausea. 12 Tab 3    cyanocobalamin 1,000 mcg tablet Take 1,000 mcg by mouth daily.       Blood-Glucose Meter monitoring kit Test blood sugar once a day .00 One Touch Ultra Mini Glucose Meter 1 Kit 0    glucose blood VI test strips (ASCENSIA AUTODISC VI, ONE TOUCH ULTRA TEST VI) strip Test blood sugar once daily .00 One Touch Ultra Mini Test strip 1 Package 11    Lancets misc Use with One Touch Ultra Mini Glucose Meter test blood sugar once daily DX 250.00 1 Package 11    MULTI-VITAMIN PO Take  by mouth daily.  ascorbic acid (VITAMIN C) 500 mg tablet Take 500 mg by mouth daily.        Allergies   Allergen Reactions    Codeine Nausea Only and Other (comments)     Sweats felt like she was going to pass out    Procardia [Nifedipine] Shortness of Breath, Nausea and Vomiting and Other (comments)     Tightness in chest on 1990    Sulfa (Sulfonamide Antibiotics) Rash    Tylox [Oxycodone-Acetaminophen] Nausea Only and Other (comments)     Sweats, felt like she was going to pass out   [employer] With Codeine  [Acetaminophen-Codeine] Unknown (comments)     Past Medical History:   Diagnosis Date    Anemia NEC ///    during pregnancy    AR (allergic rhinitis) 2011    Eczema     since childhood    Gestational diabetes 1993    x1 pregnancy    Hypertension     Type 2 diabetes mellitus without complication, without long-term current use of insulin ([facility] [address] 2016     Past Surgical History:   Procedure Laterality Date    DELIVERY   [id number],[id number]    x4    HX TOTAL ABDOMINAL HYSTERECTOMY  10/2018    abd hyst with BSO; cervix was left due to scar tissue    CA ABDOMEN SURGERY PROC UNLISTED      Explore lap at 12 y/o    CA LAP,CHOLECYSTECTOMY/EXPLORE      CA REMOVAL GALLBLADDER       Family History   Problem Relation Age of Onset    Hypertension Mother     Cancer Mother 61        breast    Other Mother         diverticulosis    Allergic Rhinitis Mother     Breast Cancer Mother     Cancer Father 32        glands, neck    Cancer Maternal Grandmother         breast-brain    Breast Cancer Maternal Grandmother     Diabetes Maternal Grandfather     Hypertension Maternal Grandfather     Other Paternal Grandmother         narcolepsy    Eczema Daughter     Asthma Daughter     Allergic Rhinitis Daughter     Eczema Daughter     Allergic Rhinitis Daughter     Allergic Rhinitis Daughter     Allergic Rhinitis Daughter      Social History     Tobacco Use    Smoking status: Never Smoker    Smokeless tobacco: Never Used   Substance Use Topics    Alcohol use: Yes     Comment: rare       Review of Systems   Constitutional: Negative. HENT: Negative. Respiratory: Negative. Cardiovascular: Negative. All other systems reviewed and are negative. Objective:     Patient-Reported Vitals 4/21/2021   Patient-Reported Weight 215.8   Patient-Reported Pulse 68   Patient-Reported Systolic  057   Patient-Reported Diastolic 69      General: alert, cooperative, no distress   Mental  status: normal mood, behavior, speech, dress, motor activity, and thought processes, able to follow commands   HENT: NCAT   Neck: no visualized mass   Resp: no respiratory distress   Neuro: no gross deficits   Skin: no discoloration or lesions of concern on visible areas   Psychiatric: normal affect, consistent with stated mood, no evidence of hallucinations     Additional exam findings: none      We discussed the expected course, resolution and complications of the diagnosis(es) in detail. Medication risks, benefits, costs, interactions, and alternatives were discussed as indicated. I advised her to contact the office if her condition worsens, changes or fails to improve as anticipated. She expressed understanding with the diagnosis(es) and plan. Devoria Kehr, was evaluated through a synchronous (real-time) audio-video encounter. The patient (or guardian if applicable) is aware that this is a billable service. Verbal consent to proceed has been obtained within the past 12 months. The visit was conducted pursuant to the emergency declaration under the [facility], [address] [place] [address] authority and the [employer] General Act. Patient identification was verified, and a caregiver was present when appropriate.  The patient was located in a state where the provider was credentialed to provide care.     Tereza Can MD

## 2021-04-21 NOTE — PROGRESS NOTES
Dorian Oliveros presents today for   Chief Complaint   Patient presents with    Diabetes    Hypertension    Cholesterol Problem    Labs     1/6/21       Virtual/telephone visit    Depression Screening:  3 most recent PHQ Screens 1/5/2021   PHQ Not Done -   Little interest or pleasure in doing things Not at all   Feeling down, depressed, irritable, or hopeless Not at all   Total Score PHQ 2 0   Trouble falling or staying asleep, or sleeping too much -   Feeling tired or having little energy -   Poor appetite, weight loss, or overeating -   Feeling bad about yourself - or that you are a failure or have let yourself or your family down -   Trouble concentrating on things such as school, work, reading, or watching TV -   Moving or speaking so slowly that other people could have noticed; or the opposite being so fidgety that others notice -   Thoughts of being better off dead, or hurting yourself in some way -   PHQ 9 Score -   How difficult have these problems made it for you to do your work, take care of your home and get along with others -       Learning Assessment:  Learning Assessment 4/21/2021   PRIMARY LEARNER Patient   HIGHEST LEVEL OF EDUCATION - PRIMARY LEARNER  > 4 YEARS OF COLLEGE   BARRIERS PRIMARY LEARNER NONE   CO-LEARNER CAREGIVER No   PRIMARY LANGUAGE ENGLISH   LEARNER PREFERENCE PRIMARY LISTENING   ANSWERED BY Patient   RELATIONSHIP SELF       Travel Screening:   Travel Screening     Question   Response    In the last month, have you been in contact with someone who was confirmed or suspected to have Coronavirus / COVID-19? No / Unsure    Have you had a COVID-19 viral test in the last 14 days? No    Do you have any of the following new or worsening symptoms? None of these    Have you traveled internationally or domestically in the last month?   No      Travel History   Travel since 03/21/21     No documented travel since 03/21/21          Health Maintenance reviewed and discussed and ordered per Provider. Health Maintenance Due   Topic Date Due    Hepatitis C Screening  Never done    Foot Exam Q1  Never done    COVID-19 Vaccine (1) Never done    Shingrix Vaccine Age 50> (1 of 2) Never done   . Coordination of Care:  1. Have you been to the ER, urgent care clinic since your last visit? Hospitalized since your last visit? no    2. Have you seen or consulted any other health care providers outside of the [address] [place] [address] since your last visit? Include any pap smears or colon screening.  no

## 2021-07-29 ENCOUNTER — OFFICE VISIT (OUTPATIENT)
Dept: FAMILY MEDICINE CLINIC | Age: 51
End: 2021-07-29
Payer: COMMERCIAL

## 2021-07-29 VITALS
SYSTOLIC BLOOD PRESSURE: 134 MMHG | WEIGHT: 215 LBS | OXYGEN SATURATION: 99 % | HEIGHT: 66 IN | RESPIRATION RATE: 20 BRPM | BODY MASS INDEX: 34.55 KG/M2 | HEART RATE: 64 BPM | TEMPERATURE: 97.2 F | DIASTOLIC BLOOD PRESSURE: 88 MMHG

## 2021-07-29 DIAGNOSIS — E78.5 HYPERLIPIDEMIA ASSOCIATED WITH TYPE 2 DIABETES MELLITUS (HCC): ICD-10-CM

## 2021-07-29 DIAGNOSIS — E11.69 HYPERLIPIDEMIA ASSOCIATED WITH TYPE 2 DIABETES MELLITUS (HCC): ICD-10-CM

## 2021-07-29 DIAGNOSIS — I10 ESSENTIAL HYPERTENSION: ICD-10-CM

## 2021-07-29 DIAGNOSIS — R11.0 NAUSEA: ICD-10-CM

## 2021-07-29 DIAGNOSIS — D64.9 ANEMIA, UNSPECIFIED TYPE: ICD-10-CM

## 2021-07-29 DIAGNOSIS — E11.9 TYPE 2 DIABETES MELLITUS WITHOUT COMPLICATION, WITHOUT LONG-TERM CURRENT USE OF INSULIN (HCC): Primary | ICD-10-CM

## 2021-07-29 PROBLEM — D25.9 LEIOMYOMA OF UTERUS: Status: ACTIVE | Noted: 2018-10-30

## 2021-07-29 PROCEDURE — 99214 OFFICE O/P EST MOD 30 MIN: CPT | Performed by: FAMILY MEDICINE

## 2021-07-29 PROCEDURE — 3052F HG A1C>EQUAL 8.0%<EQUAL 9.0%: CPT | Performed by: FAMILY MEDICINE

## 2021-07-29 RX ORDER — ONDANSETRON 8 MG/1
8 TABLET, ORALLY DISINTEGRATING ORAL
Qty: 12 TABLET | Refills: 3 | Status: SHIPPED | OUTPATIENT
Start: 2021-07-29

## 2021-07-29 RX ORDER — ALCAFTADINE 2.5 MG/ML
SOLUTION/ DROPS OPHTHALMIC
COMMUNITY
Start: 2021-06-24

## 2021-07-29 NOTE — PROGRESS NOTES
Todd Mittal presents today for   Chief Complaint   Patient presents with    Diabetes    Hypertension    Cholesterol Problem       Is someone accompanying this pt? No    Is the patient using any DME equipment during OV? No    Depression Screening:  3 most recent PHQ Screens 1/5/2021   PHQ Not Done -   Little interest or pleasure in doing things Not at all   Feeling down, depressed, irritable, or hopeless Not at all   Total Score PHQ 2 0   Trouble falling or staying asleep, or sleeping too much -   Feeling tired or having little energy -   Poor appetite, weight loss, or overeating -   Feeling bad about yourself - or that you are a failure or have let yourself or your family down -   Trouble concentrating on things such as school, work, reading, or watching TV -   Moving or speaking so slowly that other people could have noticed; or the opposite being so fidgety that others notice -   Thoughts of being better off dead, or hurting yourself in some way -   PHQ 9 Score -   How difficult have these problems made it for you to do your work, take care of your home and get along with others -       Learning Assessment:  Learning Assessment 4/21/2021   PRIMARY LEARNER Patient   HIGHEST LEVEL OF EDUCATION - PRIMARY LEARNER  > 4 YEARS OF COLLEGE   BARRIERS PRIMARY LEARNER NONE   CO-LEARNER CAREGIVER No   PRIMARY LANGUAGE ENGLISH   LEARNER PREFERENCE PRIMARY LISTENING   ANSWERED BY Patient   RELATIONSHIP SELF       Travel Screening:   Travel Screening     Question   Response    In the last month, have you been in contact with someone who was confirmed or suspected to have Coronavirus / COVID-19? No / Unsure    Have you had a COVID-19 viral test in the last 14 days? No    Do you have any of the following new or worsening symptoms? None of these    Have you traveled internationally or domestically in the last month?   No      Travel History   Travel since 06/29/21     No documented travel since 06/29/21          Health Maintenance Due   Topic Date Due    Hepatitis C Screening  Never done    Foot Exam Q1  Never done    Shingrix Vaccine Age 50> (1 of 2) Never done   . Health Maintenance reviewed and discussed and ordered per Provider. Alberto Herrera is updated on all     Coordination of Care  1. Have you been to the ER, urgent care clinic since your last visit? Hospitalized since your last visit? No    2. Have you seen or consulted any other health care providers outside of the [address] [place] [address] since your last visit? Include any pap smears or colon screening.  No.

## 2021-07-29 NOTE — PROGRESS NOTES
Chief Complaint   Patient presents with    Diabetes    Hypertension    Cholesterol Problem         HPI    Nhi Cabrera is a 45 y.o. female presenting today for 3 months  follow up of dm, htn, hld. Pt has been doing well overall. No recent labs. Patient does need medication refills today. She requests zofran refill as it helps when she has nausea related to sporadic vertigo. New concerns today: pt c/o recent congestion. She had some sx in the R ear including pressure. It improved with sinus tablets. Review of Systems   Constitutional: Negative. HENT: Negative. Respiratory: Negative. Cardiovascular: Negative. All other systems reviewed and are negative. Physical Exam  Vitals and nursing note reviewed. Constitutional:       Appearance: Normal appearance. She is not ill-appearing. HENT:      Head: Normocephalic and atraumatic. Right Ear: Tympanic membrane, ear canal and external ear normal.      Left Ear: Tympanic membrane, ear canal and external ear normal.      Nose: Nose normal.      Mouth/Throat:      Mouth: Mucous membranes are moist.   Eyes:      Extraocular Movements: Extraocular movements intact. Conjunctiva/sclera: Conjunctivae normal.   Cardiovascular:      Rate and Rhythm: Normal rate and regular rhythm. Heart sounds: No murmur heard. No friction rub. No gallop. Pulmonary:      Effort: Pulmonary effort is normal.      Breath sounds: Normal breath sounds. No wheezing, rhonchi or rales. Musculoskeletal:         General: Normal range of motion. Cervical back: Normal range of motion. Skin:     General: Skin is warm and dry. Neurological:      Mental Status: She is alert and oriented to person, place, and time. Coordination: Coordination normal.   Psychiatric:         Mood and Affect: Mood normal.         Behavior: Behavior normal.         Thought Content:  Thought content normal.         Judgment: Judgment normal. Diagnoses and all orders for this visit:    1. Type 2 diabetes mellitus without complication, without long-term current use of insulin (HCC)  -     METABOLIC PANEL, COMPREHENSIVE; Future  -     LIPID PANEL; Future  -     HEMOGLOBIN A1C WITH EAG; Future  -     MICROALBUMIN, UR, RAND W/ MICROALB/CREAT RATIO; Future    2. Nausea  -     ondansetron (ZOFRAN ODT) 8 mg disintegrating tablet; Take 1 Tablet by mouth every eight (8) hours as needed for Nausea. 3. Essential hypertension  -     METABOLIC PANEL, COMPREHENSIVE; Future  -     LIPID PANEL; Future    4. Hyperlipidemia associated with type 2 diabetes mellitus (HCC)  -     METABOLIC PANEL, COMPREHENSIVE; Future  -     LIPID PANEL; Future    5. Anemia, unspecified type  -     CBC WITH AUTOMATED DIFF      Follow-up and Dispositions    · Return in about 3 months (around 10/29/2021) for diabetes, high blood pressure, high cholesterol, lab review.

## 2021-08-02 DIAGNOSIS — R11.0 NAUSEA: ICD-10-CM

## 2021-08-02 DIAGNOSIS — L30.9 DERMATITIS: ICD-10-CM

## 2021-08-02 NOTE — TELEPHONE ENCOUNTER
Pt needs rx refill for Meclizine and   Requested Prescriptions     Pending Prescriptions Disp Refills    triamcinolone acetonide (KENALOG) 0.1 % topical cream 80 g 3     Sig: APPLY A THIN LAYER TO AFFECTED AREA TWICE DAILY     Please advise

## 2021-08-04 RX ORDER — MECLIZINE HCL 12.5 MG 12.5 MG/1
12.5 TABLET ORAL
Qty: 30 TABLET | Refills: 1 | Status: SHIPPED | OUTPATIENT
Start: 2021-08-04

## 2021-08-04 RX ORDER — TRIAMCINOLONE ACETONIDE 1 MG/G
CREAM TOPICAL
Qty: 80 G | Refills: 3 | Status: SHIPPED | OUTPATIENT
Start: 2021-08-04 | End: 2021-10-05

## 2021-08-05 DIAGNOSIS — I10 ESSENTIAL HYPERTENSION: ICD-10-CM

## 2021-08-05 RX ORDER — ALISKIREN 300 MG/1
TABLET, FILM COATED ORAL
Qty: 30 TABLET | Refills: 5 | Status: SHIPPED | OUTPATIENT
Start: 2021-08-05 | End: 2021-09-13 | Stop reason: SDUPTHER

## 2021-08-27 DIAGNOSIS — E11.9 TYPE 2 DIABETES MELLITUS WITHOUT COMPLICATION, WITHOUT LONG-TERM CURRENT USE OF INSULIN (HCC): ICD-10-CM

## 2021-08-27 DIAGNOSIS — I10 ESSENTIAL HYPERTENSION: ICD-10-CM

## 2021-08-27 RX ORDER — SITAGLIPTIN 100 MG/1
TABLET, FILM COATED ORAL
Qty: 30 TABLET | Refills: 5 | Status: SHIPPED | OUTPATIENT
Start: 2021-08-27 | End: 2022-02-25

## 2021-08-27 RX ORDER — GLIPIZIDE 5 MG/1
TABLET ORAL
Qty: 60 TABLET | Refills: 5 | Status: SHIPPED | OUTPATIENT
Start: 2021-08-27 | End: 2022-02-25

## 2021-08-27 RX ORDER — HYDROCHLOROTHIAZIDE 25 MG/1
TABLET ORAL
Qty: 30 TABLET | Refills: 5 | Status: SHIPPED | OUTPATIENT
Start: 2021-08-27 | End: 2022-02-25

## 2021-08-27 RX ORDER — METFORMIN HYDROCHLORIDE 1000 MG/1
TABLET ORAL
Qty: 60 TABLET | Refills: 5 | Status: SHIPPED | OUTPATIENT
Start: 2021-08-27 | End: 2022-02-25

## 2021-09-01 ENCOUNTER — HOSPITAL ENCOUNTER (OUTPATIENT)
Dept: LAB | Age: 51
Discharge: HOME | End: 2021-09-01

## 2021-09-01 LAB — SENTARA SPECIMEN COL,SENBCF: NORMAL

## 2021-09-01 PROCEDURE — 99001 SPECIMEN HANDLING PT-LAB: CPT

## 2021-09-02 LAB
ABSOLUTE LYMPHOCYTE COUNT, 10803: 2.4 K/UL (ref 1–4.8)
BASOPHILS # BLD: 0 K/UL (ref 0–0.2)
BASOPHILS NFR BLD: 1 % (ref 0–2)
EOSINOPHIL # BLD: 0 K/UL (ref 0–0.5)
EOSINOPHIL NFR BLD: 1 % (ref 0–6)
ERYTHROCYTE [DISTWIDTH] IN BLOOD BY AUTOMATED COUNT: 17.3 % (ref 10–15.5)
GRANULOCYTES,GRANS: 53 % (ref 40–75)
HCT VFR BLD AUTO: 39.1 % (ref 35.1–48)
HGB BLD-MCNC: 11.1 G/DL (ref 11.7–16)
LYMPHOCYTES, LYMLT: 36 % (ref 20–45)
MCH RBC QN AUTO: 25 PG (ref 26–34)
MCHC RBC AUTO-ENTMCNC: 28 G/DL (ref 31–36)
MCV RBC AUTO: 88 FL (ref 81–99)
MONOCYTES # BLD: 0.6 K/UL (ref 0.1–1)
MONOCYTES NFR BLD: 9 % (ref 3–12)
NEUTROPHILS # BLD AUTO: 3.6 K/UL (ref 1.8–7.7)
PLATELET # BLD AUTO: 334 K/UL (ref 140–440)
PMV BLD AUTO: 10.8 FL (ref 9–13)
RBC # BLD AUTO: 4.47 M/UL (ref 3.8–5.2)
WBC # BLD AUTO: 6.8 K/UL (ref 4–11)

## 2021-09-09 ENCOUNTER — OFFICE VISIT (OUTPATIENT)
Dept: ORTHOPEDIC SURGERY | Age: 51
End: 2021-09-09
Payer: COMMERCIAL

## 2021-09-09 VITALS
BODY MASS INDEX: 35.03 KG/M2 | OXYGEN SATURATION: 98 % | HEIGHT: 66 IN | HEART RATE: 75 BPM | TEMPERATURE: 97.1 F | WEIGHT: 218 LBS

## 2021-09-09 DIAGNOSIS — M65.322 TRIGGER INDEX FINGER OF LEFT HAND: Primary | ICD-10-CM

## 2021-09-09 PROCEDURE — 99214 OFFICE O/P EST MOD 30 MIN: CPT | Performed by: ORTHOPAEDIC SURGERY

## 2021-09-09 PROCEDURE — 20550 NJX 1 TENDON SHEATH/LIGAMENT: CPT | Performed by: ORTHOPAEDIC SURGERY

## 2021-09-09 NOTE — PROGRESS NOTES
Francis Ramirez is a 45 y.o. female right handed individual, not currently working. Worker's Compensation and legal considerations: not known. Vitals:    09/09/21 0830   Pulse: 75   Temp: 97.1 °F (36.2 °C)   TempSrc: Temporal   SpO2: 98%   Weight: 218 lb (98.9 kg)   Height: 5' 6\" (1.676 m)   PainSc:   6   PainLoc: Hand   LMP: 10/30/2018           Chief Complaint   Patient presents with    Hand Pain     req inj in left hand       HPI: Patient presents today with a new trigger finger along the left index finger. She is requesting an injection. She has recently had a hemoglobin A1c that was down from over 12-8.9.    8/10/2020 HPI: Patient comes in today with complaints of a mass on her left middle finger. It was previously removed and biopsied by dermatology but it keeps coming back. She has been referred here for surgical removal.  She reports it hurts anytime she bumps it.    7/27/2020 HPI: Patient comes in today regarding her right index finger pain and locking. At her previous visit she had right ring finger and bilateral middle finger trigger injections. She reports these have significantly helped with almost no problems. Over she reports a new problem of a right index finger trigger    Initial HPI: Bilateral finger triggering in multiple digits. Today, the Right Ring and Bilateral Middle Fingers are bothering her.     Date of onset:  indeterminate    Injury: No    Prior Treatment:  Yes: Comment: Bilateral middle finger A1 pulley, right index finger A1 pulley, and left ring finger injections and    Numbness/ Tingling: No      ROS: Review of Systems - General ROS: negative  Psychological ROS: negative  ENT ROS: negative  Allergy and Immunology ROS: negative  Hematological and Lymphatic ROS: negative  Respiratory ROS: no cough, shortness of breath, or wheezing  Cardiovascular ROS: no chest pain or dyspnea on exertion  Gastrointestinal ROS: no abdominal pain, change in bowel habits, or black or bloody stools  Musculoskeletal ROS: positive for - pain in finger - bilateral and hand - bilateral  Neurological ROS: negative  Dermatological ROS: negative    Past Medical History:   Diagnosis Date    Anemia NEC ///    during pregnancy    AR (allergic rhinitis) 2011    Eczema     since childhood    Gestational diabetes 1993    x1 pregnancy    Hypertension     Type 2 diabetes mellitus without complication, without long-term current use of insulin ([address] 2016       Past Surgical History:   Procedure Laterality Date    DELIVERY   [id number]    x4    HX TOTAL ABDOMINAL HYSTERECTOMY  10/2018    abd hyst with BSO; cervix was left due to scar tissue    MD ABDOMEN SURGERY PROC UNLISTED      Explore lap at 10 y/o    MD LAP,CHOLECYSTECTOMY/EXPLORE      MD REMOVAL GALLBLADDER         Current Outpatient Medications   Medication Sig Dispense Refill    glipiZIDE (GLUCOTROL) 5 mg tablet TAKE 1 TABLET BY MOUTH TWICE A DAY 60 Tablet 5    metFORMIN (GLUCOPHAGE) 1,000 mg tablet TAKE 1 TABLET BY MOUTH TWICE A DAY WITH MEALS 60 Tablet 5    hydroCHLOROthiazide (HYDRODIURIL) 25 mg tablet TAKE 1 TABLET BY MOUTH EVERY DAY 30 Tablet 5    Januvia 100 mg tablet TAKE 1 TABLET BY MOUTH EVERY DAY 30 Tablet 5    aliskiren (TEKTURNA) 300 mg tablet TAKE 1 TABLET BY MOUTH EVERY DAY 30 Tablet 5    triamcinolone acetonide (KENALOG) 0.1 % topical cream APPLY A THIN LAYER TO AFFECTED AREA TWICE DAILY 80 g 3    meclizine (ANTIVERT) 12.5 mg tablet Take 1 Tablet by mouth three (3) times daily as needed for Dizziness. 30 Tablet 1    Lastacaft 0.25 % drop INSTILL 1 DROP INTO EACH EYE ONCE DAILY      ondansetron (ZOFRAN ODT) 8 mg disintegrating tablet Take 1 Tablet by mouth every eight (8) hours as needed for Nausea.  12 Tablet 3    naproxen (NAPROSYN) 500 mg tablet TAKE 1 TABLET BY MOUTH TWICE DAILY WITH FOOD 60 Tab 5    fluticasone propionate (FLONASE) 50 mcg/actuation nasal spray Use 2 spray(s) in each nostril once daily 16 g 12    albuterol (Ventolin HFA) 90 mcg/actuation inhaler INHALE TWO PUFFS BY MOUTH EVERY 4 HOURS AS NEEDED FOR WHEEZING 1 Inhaler 5    cyanocobalamin 1,000 mcg tablet Take 1,000 mcg by mouth daily.  Blood-Glucose Meter monitoring kit Test blood sugar once a day .00 One Touch Ultra Mini Glucose Meter 1 Kit 0    glucose blood VI test strips (ASCENSIA AUTODISC VI, ONE TOUCH ULTRA TEST VI) strip Test blood sugar once daily .00 One Touch Ultra Mini Test strip 1 Package 11    Lancets misc Use with One Touch Ultra Mini Glucose Meter test blood sugar once daily .00 1 Package 11    MULTI-VITAMIN PO Take  by mouth daily.  ascorbic acid (VITAMIN C) 500 mg tablet Take 500 mg by mouth daily.  epinastine 0.05 % drop INSTILL 1 DROP INTO EACH EYE TWICE DAILY (Patient not taking: Reported on 7/29/2021)       Current Facility-Administered Medications   Medication Dose Route Frequency Provider Last Rate Last Admin    triamcinolone acetonide (KENALOG) 10 mg/mL injection 5 mg  5 mg Other ONCE Ricardo Louie, DO           Allergies   Allergen Reactions    Codeine Nausea Only and Other (comments)     Sweats felt like she was going to pass out    Procardia [Nifedipine] Shortness of Breath, Nausea and Vomiting and Other (comments)     Tightness in chest on 7/6/1990    Sulfa (Sulfonamide Antibiotics) Rash    Tylox [Oxycodone-Acetaminophen] Nausea Only and Other (comments)     Sweats, felt like she was going to pass out   [employer] With Codeine  [Acetaminophen-Codeine] Unknown (comments)           PE:     Physical Exam  Vitals and nursing note reviewed. Constitutional:       General: She is not in acute distress. Appearance: Normal appearance. She is not ill-appearing. Cardiovascular:      Pulses: Normal pulses. Pulmonary:      Effort: Pulmonary effort is normal. No respiratory distress.    Musculoskeletal:         General: Swelling and tenderness present. No deformity or signs of injury. Normal range of motion. Cervical back: Normal range of motion. Right lower leg: No edema. Left lower leg: No edema. Skin:     General: Skin is warm. Capillary Refill: Capillary refill takes less than 2 seconds. Findings: No bruising or erythema. Neurological:      General: No focal deficit present. Mental Status: She is alert. Psychiatric:         Mood and Affect: Mood normal.         Behavior: Behavior normal.            Hand: The ulcerated mucous cyst lesion over the dorsal DIP joint of the middle finger appears to have shrunk in size from previous visit. Examination L Digit(s) R Digit(s)   1st [facility] Tenderness -  -    1st [facility] Grind -  -    Zak Nodes -  -    Heberden Nodes -  -    A1 Pulley Tenderness + IF -    Triggering + IF -    UCL Instability -  -    RCL Instability -  -    Lateral Stress Pain -  -    Palmar Cords -  -    Tabletop test -  -    Garrod's Pads -  -     Strength       Pinch Strength         ROM: Full          Imagin/10/2020 plain films of the left middle finger does not show any osseous lesion fracture or dislocation. ICD-10-CM ICD-9-CM    1. Trigger index finger of left hand  M65.322 727.03 INJECT TENDON SHEATH/LIGAMENT      triamcinolone acetonide (KENALOG) 10 mg/mL injection 5 mg         Plan:     Left index finger A1 pulley injection. Also discussed the need for repeat hemoglobin A1c in the future given that she is still above 8 however made significant improvements. Follow-up and Dispositions    · Return if symptoms worsen or fail to improve.           Plan was reviewed with patient, who verbalized agreement and understanding of the plan     [facility] NOTE        Chart reviewed for the following:   IRicardo DO, have reviewed the History, Physical and updated the Allergic reactions for [facility]     TIME OUT performed immediately prior to start of procedure:   Ricardo SAHNI DO, have performed the following reviews on Reginald Becerril prior to the start of the procedure:            * Patient was identified by name and date of birth   * Agreement on procedure being performed was verified  * Risks and Benefits explained to the patient  * Procedure site verified and marked as necessary  * Patient was positioned for comfort  * Consent was signed and verified     Time: 08:45 AM      Date of procedure: 9/9/2021    Procedure performed by: Zuleima Bennett DO    Provider assisted by: Samy Tavarez MA    Patient assisted by: self    How tolerated by patient: tolerated the procedure well with no complications    Post Procedural Pain Scale: 0 - No Hurt    Comments: none    Procedure:  After consent was obtained, using sterile technique the left index finger was prepped. Local anesthetic used: 1% lidocaine. Kenalog 5 mg and was then injected and the needle withdrawn. The procedure was well tolerated. The patient is asked to continue to rest the area for a few more days before resuming regular activities. It may be more painful for the first 1-2 days. Watch for fever, or increased swelling or persistent pain in the joint. Call or return to clinic prn if such symptoms occur or there is failure to improve as anticipated.

## 2021-09-21 NOTE — ED PROVIDER NOTES
EMERGENCY DEPARTMENT HISTORY AND PHYSICAL EXAM    9:30 AM      Date: 6/14/2018  Patient Name: Miguel Angel Massey    History of Presenting Illness     Chief Complaint   Patient presents with    Abdominal Pain    Constipation         History Provided By: Patient    Chief Complaint: back/abdominal pain, nausea, vomiting, constipation  Duration: 1 Days  Timing:  Acute and Worsening  Location: right lower back, right lower abdomen  Quality: Aching  Severity: Moderate  Modifying Factors: no relief with Naproxen  Associated Symptoms: denies any other associated signs or symptoms      Additional History (Context): Miguel Angel Massey is a 49 y.o. female with diabetes and hypertension who presents with 1 day of acute onset worsening moderate aching right lower back pain that radiates to right lower abdomen with no relief with Naproxen. Pt reports having some mild back pain for a few days and that it got abruptly worse yesterday. Pt states that she got a very small amount of relief with a bowel movement this morning. Pt has history of chronic constipation. Pt denies fever, dysuria, vaginal discharge, loss of bowel or bladder control. No other concerns or symptoms at this time. PCP: Michelle Silva MD    Current Facility-Administered Medications   Medication Dose Route Frequency Provider Last Rate Last Dose    acetaminophen (TYLENOL) tablet 1,000 mg  1,000 mg Oral NOW Symone Munoz MD         Current Outpatient Prescriptions   Medication Sig Dispense Refill    polyethylene glycol (MIRALAX) 17 gram/dose powder Take 17 g by mouth daily. 1 tablespoon with 8 oz of water daily 510 g 0    magnesium citrate solution Take 1/3 of bottle every 8 hours until finished or until you have a bowel movement. 1 Bottle 0    ibuprofen (MOTRIN) 800 mg tablet Take 1 Tab by mouth every eight (8) hours as needed for Pain for up to 7 days.  20 Tab 0    acetaminophen (TYLENOL EXTRA STRENGTH) 500 mg tablet Take 1-2 Tabs by mouth every six (6) Talya Wilson is a 58 y.o. male who presents today for Hypertension (med refill/ follow up ), Hyperlipidemia (med refill/ follow up ), and Diabetes (med refill/ follow up .... pt is fasting )      Patient presents for hypertension, hyperlipidemia, and diabetes follow up:    Diabetes: He reports he takes his medicine on time every day. He monitors his blood sugar at home. He has not brought his records. He states every morning his glucose is 100-120 every time, in the evening before eating, averaging between 210-220. His last A1C was 12/2020, 7.9%. His eats a balanced diet, some honey in his coffee on the weekends only. He walks, due to lack of transportion, estimating about 1 mile per day. He has not seen eye doctor in a year, he would like a recommendation. Patient reports, starting spring 2021, his feet developed mild pain, 3 out of 10, with tingling worse in the morning, gets better after moving and walking, capsaicin cream helps; around twice per week, since Spring 2021, he reports his left great toe, and two smallest toes on his right foot feel like they are on fire, mostly in the morning, relief gained from capsaicin. Reports fatigue, starting Spring of 2021, twice daily, in the morning and in the evening around 4, finds relief from drinking fluids, coffee, katelin, tea will give relief.  Denies blurred vision, light-headedness, dizzy, or head ache.     Essential Hypertension: He reports he takes his medicine on time every day. He take his BP at home about 3 times per week, ave 140-160 / 70-90, for about 3 months ago. Before that he averaged 140-150 / 80-85. Denies tinnitis, light-headedness with standing, head ache, SOA, peripheral edema.    Hyperlipidemia: He reports he takes his medicine on time every day. He eats a balanced diet, only eats meat a couple times per month, basic food is rice, beans, and vegetables. He uses corn oil and olive oil with preparation. Denies nose bleeds, head ache, blood in urine.        Review of Systems   Constitutional: Positive for fatigue (morning, afternoon around 4pm). Negative for activity change and appetite change.   HENT: Negative for ear pain, nosebleeds and tinnitus.    Eyes: Negative for blurred vision, double vision and pain.   Respiratory: Negative for chest tightness and shortness of breath.    Cardiovascular: Negative for chest pain, palpitations and leg swelling.   Gastrointestinal: Negative for abdominal pain, nausea and vomiting.   Endocrine: Negative for polydipsia, polyphagia and polyuria.   Genitourinary: Negative for dysuria, frequency, hematuria and urgency.   Musculoskeletal: Negative for arthralgias and joint swelling.   Skin: Negative for rash.   Neurological: Negative for dizziness, light-headedness and headache.   Hematological: Does not bruise/bleed easily.   Psychiatric/Behavioral: Negative for suicidal ideas and depressed mood. The patient is not nervous/anxious.         The following portions of the patient's history were reviewed and updated as appropriate: allergies, current medications, past family history, past medical history, past social history, past surgical history and problem list.    Current Outpatient Medications on File Prior to Visit   Medication Sig Dispense Refill   • aspirin 81 MG EC tablet Take 1 tablet by mouth Daily.     • Blood Glucose Monitoring Suppl (OneTouch Verio) w/Device kit 1 kit Daily. 1 kit 0   • Blood Glucose Monitoring Suppl (PRECISION XTRA) w/Device kit 1 each Daily. 1 kit 0   • Capsaicin 0.1 % cream Use topically on bottoms of feet TID for pain 56.6 g 11   • glucose blood (OneTouch Verio) test strip Use as instructed to test blood sugar 3-4 times per day 100 each 12   • glucose blood (PRECISION XTRA TEST STRIPS) test strip Use to check blood sugars  each 11   • insulin aspart prot-insulin aspart (NovoLOG Mix 70/30) (70-30) 100 UNIT/ML injection INJECT 35 UNITS BEFORE BREAKFAST AND 30 UNITS BEFORE DINNER 20 mL 5   •  hours as needed for Pain. 30 Tab 0    JANUMET  mg per tablet TAKE ONE TABLET BY MOUTH TWICE DAILY WITH MEALS 180 Tab 1    lisinopril-hydroCHLOROthiazide (PRINZIDE, ZESTORETIC) 10-12.5 mg per tablet TAKE ONE TABLET BY MOUTH ONCE DAILY 30 Tab 5    phentermine (ADIPEX-P) 37.5 mg tablet Take 1 Tab by mouth every morning. Max Daily Amount: 37.5 mg. 30 Tab 0    ondansetron (ZOFRAN ODT) 8 mg disintegrating tablet Take 1 Tab by mouth every eight (8) hours as needed for Nausea. 12 Tab 3    VENTOLIN HFA 90 mcg/actuation inhaler INHALE TWO PUFFS BY MOUTH EVERY 4 HOURS AS NEEDED FOR WHEEZING 1 Inhaler 5    triamcinolone acetonide (KENALOG) 0.1 % topical cream Apply  to affected area two (2) times a day. use thin layer 80 g 3    naproxen (NAPROSYN) 500 mg tablet TAKE ONE TABLET BY MOUTH TWICE DAILY WITH MEALS 60 Tab 5    meclizine (ANTIVERT) 12.5 mg tablet Take 1 Tab by mouth three (3) times daily as needed. 30 Tab 1    fluticasone (FLONASE) 50 mcg/actuation nasal spray 2 Sprays by Both Nostrils route daily. 1 Bottle 5    montelukast (SINGULAIR) 10 mg tablet Take 1 Tab by mouth daily. 30 Tab 5    loratadine (CLARITIN) 10 mg tablet Take 1 Tab by mouth daily. 30 Tab 5    cyanocobalamin 1,000 mcg tablet Take 1,000 mcg by mouth daily.  Blood-Glucose Meter monitoring kit Test blood sugar once a day .00 One Touch Ultra Mini Glucose Meter 1 Kit 0    glucose blood VI test strips (ASCENSIA AUTODISC VI, ONE TOUCH ULTRA TEST VI) strip Test blood sugar once daily .00 One Touch Ultra Mini Test strip 1 Package 11    Lancets misc Use with One Touch Ultra Mini Glucose Meter test blood sugar once daily .00 1 Package 11    B.infantis-B.ani-B.long-B.bifi (PROBIOTIC 4X) 10-15 mg TbEC Take  by mouth daily.  ferrous sulfate (SLOW RELEASE IRON) 140 mg (45 mg iron) TbER Take  by mouth every other day.  MULTI-VITAMIN PO Take  by mouth daily.       ascorbic acid (VITAMIN C) 500 mg tablet Take 500 mg "Insulin Syringe-Needle U-100 (UltiCare Insulin Syringe) 30G X 5/16\" 0.5 ML misc USE TO ADMINISTER INSULIN TWO TIMES A DAY 60 each 11   • Lancets (freestyle) lancets 1 each by Other route 2 (two) times a day. Use as instructed 100 each 11   • [DISCONTINUED] atorvastatin (LIPITOR) 80 MG tablet Take 1 tablet by mouth Daily. 90 tablet 3   • [DISCONTINUED] Empagliflozin (Jardiance) 25 MG tablet Take 25 mg by mouth Daily. 30 tablet 2   • [DISCONTINUED] lisinopril-hydrochlorothiazide (PRINZIDE,ZESTORETIC) 20-12.5 MG per tablet Take 1 tablet by mouth Daily. 90 tablet 3   • [DISCONTINUED] metFORMIN ER (GLUCOPHAGE-XR) 500 MG 24 hr tablet Take 2 tab  tablet 6     No current facility-administered medications on file prior to visit.       No Known Allergies     Visit Vitals  /70   Pulse 75   Temp 98.2 °F (36.8 °C)   Ht 175.3 cm (69\")   Wt 98.2 kg (216 lb 6.4 oz)   SpO2 99%   BMI 31.96 kg/m²        Physical Exam  Vitals and nursing note reviewed.   Constitutional:       General: He is not in acute distress.     Appearance: Normal appearance. He is obese. He is not ill-appearing, toxic-appearing or diaphoretic.   HENT:      Head: Normocephalic and atraumatic.   Eyes:      General: Lids are normal. Lids are everted, no foreign bodies appreciated. Vision grossly intact. Gaze aligned appropriately. No allergic shiner, visual field deficit or scleral icterus.        Right eye: No discharge.         Left eye: No discharge.      Extraocular Movements: Extraocular movements intact.      Right eye: Normal extraocular motion and no nystagmus.      Left eye: Normal extraocular motion and no nystagmus.      Conjunctiva/sclera:      Right eye: Right conjunctiva is not injected.      Left eye: Left conjunctiva is not injected.      Pupils: Pupils are equal, round, and reactive to light.   Cardiovascular:      Rate and Rhythm: Normal rate and regular rhythm.      Pulses: Normal pulses.      Heart sounds: Normal heart sounds. No " by mouth daily. Past History     Past Medical History:  Past Medical History:   Diagnosis Date    Anemia NEC //    during pregnancy    AR (allergic rhinitis) 2011    Eczema     since childhood    Gestational diabetes 1993    x1 pregnancy    Type 2 diabetes mellitus without complication, without long-term current use of insulin ([facility] [address] 2016       Past Surgical History:  Past Surgical History:   Procedure Laterality Date    DELIVERY   [id number]    x4    LAP,CHOLECYSTECTOMY/EXPLORE  1986    REMOVAL GALLBLADDER         Family History:  Family History   Problem Relation Age of Onset    Hypertension Mother     Cancer Mother 61     breast    Other Mother      diverticulosis    Allergic Rhinitis Mother     Breast Cancer Mother     Cancer Father 32     glands, neck    Cancer Maternal Grandmother      breast-brain    Breast Cancer Maternal Grandmother     Diabetes Maternal Grandfather     Hypertension Maternal Grandfather     Other Paternal Grandmother      narcolepsy    Eczema Daughter     Asthma Daughter     Allergic Rhinitis Daughter     Eczema Daughter     Allergic Rhinitis Daughter     Allergic Rhinitis Daughter     Allergic Rhinitis Daughter        Social History:  Social History   Substance Use Topics    Smoking status: Never Smoker    Smokeless tobacco: Never Used    Alcohol use Yes      Comment: rare       Allergies:   Allergies   Allergen Reactions    Codeine Nausea Only and Other (comments)     Sweats felt like she was going to pass out    Procardia [Nifedipine] Shortness of Breath, Nausea and Vomiting and Other (comments)     Tightness in chest on 1990    Sulfa (Sulfonamide Antibiotics) Rash    Tylox [Oxycodone-Acetaminophen] Nausea Only and Other (comments)     Sweats, felt like she was going to pass out         Review of Systems       Review of Systems   Constitutional: Negative for activity change, appetite change, chills, diaphoresis, fatigue, fever and unexpected weight change. HENT: Negative for congestion, dental problem, drooling, ear discharge, ear pain, facial swelling, hearing loss, mouth sores, nosebleeds, postnasal drip, rhinorrhea, sinus pressure, sneezing, sore throat, tinnitus and trouble swallowing. Eyes: Negative for photophobia, pain, discharge, redness, itching and visual disturbance. Respiratory: Negative for apnea, cough, choking, chest tightness, shortness of breath, wheezing and stridor. Cardiovascular: Negative for chest pain, palpitations and leg swelling. Gastrointestinal: Positive for abdominal pain, constipation, nausea and vomiting. Negative for abdominal distention, anal bleeding, blood in stool, diarrhea and rectal pain. Endocrine: Negative for cold intolerance, heat intolerance, polydipsia, polyphagia and polyuria. Genitourinary: Negative for decreased urine volume, difficulty urinating, dysuria, enuresis, flank pain, frequency, genital sores, hematuria, urgency and vaginal discharge. Musculoskeletal: Positive for back pain. Negative for arthralgias, gait problem, joint swelling, myalgias, neck pain and neck stiffness. Skin: Negative for color change, pallor, rash and wound. Allergic/Immunologic: Negative for environmental allergies, food allergies and immunocompromised state. Neurological: Negative for dizziness, tremors, seizures, syncope, facial asymmetry, speech difficulty, weakness, light-headedness, numbness and headaches. Hematological: Negative for adenopathy. Does not bruise/bleed easily. Psychiatric/Behavioral: Negative for agitation, behavioral problems, confusion, decreased concentration, dysphoric mood, hallucinations, self-injury, sleep disturbance and suicidal ideas. The patient is not nervous/anxious and is not hyperactive.           Physical Exam     Visit Vitals    /73    Pulse 67    Temp 97.7 °F (36.5 °C)    Resp 15    Ht 5' 6\" (1.676 m)    murmur heard.   No friction rub. No gallop.    Pulmonary:      Effort: Pulmonary effort is normal. No respiratory distress.      Breath sounds: Normal breath sounds. No stridor. No wheezing, rhonchi or rales.   Chest:      Chest wall: No tenderness.   Abdominal:      General: Bowel sounds are normal. There is no distension.      Palpations: Abdomen is soft. There is no mass.      Tenderness: There is no abdominal tenderness. There is no guarding or rebound.      Hernia: No hernia is present.   Musculoskeletal:      Right foot: No deformity.      Left foot: No deformity.   Feet:      Right foot:      Skin integrity: Skin integrity normal. No ulcer, blister, skin breakdown, erythema, warmth, callus, dry skin or fissure.      Left foot:      Skin integrity: Skin integrity normal. No ulcer, blister, skin breakdown, erythema, warmth, callus, dry skin or fissure.      Comments: No tenderness to palpation or with ROM, No excess warmth noted, no lesions, breaks in skin, no guarding.   Skin:     Capillary Refill: Capillary refill takes less than 2 seconds.   Neurological:      General: No focal deficit present.      Mental Status: He is alert and oriented to person, place, and time.   Psychiatric:         Mood and Affect: Mood normal.         Behavior: Behavior normal.         Thought Content: Thought content normal.         Judgment: Judgment normal.               Problems Addressed this Visit        Cardiac and Vasculature    HTN (hypertension)     Hypertension is worsening.  Dietary sodium restriction.  Weight loss.  Regular aerobic exercise.  Medication changes per orders.  Ambulatory blood pressure monitoring.  Blood pressure will be reassessed in 3 months.         Relevant Medications    lisinopril-hydrochlorothiazide (PRINZIDE,ZESTORETIC) 20-25 MG per tablet    Other Relevant Orders    CBC & Differential    Comprehensive Metabolic Panel    Hemoglobin A1c    HLD (hyperlipidemia)     Lipid abnormalities are  Wt 93.9 kg (207 lb)    SpO2 100%    BMI 33.41 kg/m2         Physical Exam   Constitutional: She is oriented to person, place, and time. She appears well-developed and well-nourished. Alert and appropriate in apparent marked discomfort without signs of acute respiratory distress   HENT:   Head: Normocephalic and atraumatic. Right Ear: External ear normal.   Left Ear: External ear normal.   Mouth/Throat: Oropharynx is clear and moist. No oropharyngeal exudate. Eyes: Conjunctivae and EOM are normal. Pupils are equal, round, and reactive to light. Right eye exhibits no discharge. Left eye exhibits no discharge. No scleral icterus. Neck: Normal range of motion. No tracheal deviation present. No thyromegaly present. Cardiovascular: Normal rate, regular rhythm, normal heart sounds and intact distal pulses. Exam reveals no gallop and no friction rub. No murmur heard. Pulmonary/Chest: Effort normal and breath sounds normal. No respiratory distress. She has no wheezes. She has no rales. She exhibits no tenderness. Abdominal: Soft. Bowel sounds are normal. She exhibits no distension. There is no tenderness. There is no rebound and no guarding. Musculoskeletal: She exhibits tenderness. She exhibits no edema. Pain is aggravated with palpation of the soft tissues over right S-I joint; no point bony tenderness or step-off's; negative SLR; no pulsatile abdominal masses; lower extremity neurovascular examinations are intact and symmetrical; gait not tested due to discomfort; no CVAT   Lymphadenopathy:     She has no cervical adenopathy. Neurological: She is alert and oriented to person, place, and time. No cranial nerve deficit. Coordination normal.   Skin: Skin is warm. No erythema. Psychiatric: She has a normal mood and affect. Her behavior is normal. Judgment and thought content normal.   Nursing note and vitals reviewed.         Diagnostic Study Results     Labs -  Recent Results (from the past 12 unchanged.  Pharmacotherapy as ordered.  Lipids will be reassessed in 3 months.         Relevant Medications    atorvastatin (LIPITOR) 80 MG tablet    Other Relevant Orders    CBC & Differential    Comprehensive Metabolic Panel       Endocrine and Metabolic    Type 2 diabetes mellitus with hyperglycemia, with long-term current use of insulin (CMS/[facility]) - Primary     Diabetes is unchanged.   will recheck A1c and make med adjustments as indicated. Patient requested referral back to endocrinology which was placed.   Diabetes will be reassessed in 3 months.         Relevant Medications    atorvastatin (LIPITOR) 80 MG tablet    empagliflozin (Jardiance) 25 MG tablet tablet    metFORMIN ER (GLUCOPHAGE-XR) 500 MG 24 hr tablet    Other Relevant Orders    Ambulatory Referral to Endocrinology    CBC & Differential    Comprehensive Metabolic Panel    Hemoglobin A1c    Ambulatory Referral to Optometry    Ambulatory Referral to Diabetic Education      Diagnoses       Codes Comments    Type 2 diabetes mellitus with hyperglycemia, with long-term current use of insulin (CMS/[facility])    -  Primary ICD-10-CM: E11.65, Z79.4  ICD-9-CM: 250.00, 790.29, V58.67     Familial hypercholesterolemia     ICD-10-CM: E78.01  ICD-9-CM: 272.0     Essential hypertension     ICD-10-CM: I10  ICD-9-CM: 401.9           Return in about 3 months (around 12/21/2021) for Annual.    Parts of this office note have been dictated by voice recognition software. Grammatical and/or spelling errors may be present. I attest that I have reviewed the student note and that the components of the history of the present illness, the physical exam, and the assessment and plan documented were performed by me or were performed in my presence by the student and verified by me.      Johan Munoz MD   9/21/2021             hour(s))   CBC WITH AUTOMATED DIFF    Collection Time: 06/14/18  9:40 AM   Result Value Ref Range    WBC 6.6 4.6 - 13.2 K/uL    RBC 4.28 4.20 - 5.30 M/uL    HGB 9.7 (L) 12.0 - 16.0 g/dL    HCT 31.4 (L) 35.0 - 45.0 %    MCV 73.4 (L) 74.0 - 97.0 FL    MCH 22.7 (L) 24.0 - 34.0 PG    MCHC 30.9 (L) 31.0 - 37.0 g/dL    RDW 16.6 (H) 11.6 - 14.5 %    PLATELET 644 634 - 317 K/uL    MPV 9.3 9.2 - 11.8 FL    NEUTROPHILS 49 40 - 73 %    LYMPHOCYTES 37 21 - 52 %    MONOCYTES 13 (H) 3 - 10 %    EOSINOPHILS 1 0 - 5 %    BASOPHILS 0 0 - 2 %    ABS. NEUTROPHILS 3.2 1.8 - 8.0 K/UL    ABS. LYMPHOCYTES 2.4 0.9 - 3.6 K/UL    ABS. MONOCYTES 0.9 0.05 - 1.2 K/UL    ABS. EOSINOPHILS 0.1 0.0 - 0.4 K/UL    ABS. BASOPHILS 0.0 0.0 - 0.06 K/UL    DF AUTOMATED      PLATELET COMMENTS Increased Platelets      RBC COMMENTS ANISOCYTOSIS  1+        RBC COMMENTS MICROCYTOSIS  1+        RBC COMMENTS POLYCHROMASIA  1+        RBC COMMENTS HYPOCHROMIA  1+        RBC COMMENTS TARGET CELLS  FEW  STOMATOCYTES  FEW       METABOLIC PANEL, COMPREHENSIVE    Collection Time: 06/14/18  9:40 AM   Result Value Ref Range    Sodium 134 (L) 136 - 145 mmol/L    Potassium 4.1 3.5 - 5.5 mmol/L    Chloride 99 (L) 100 - 108 mmol/L    CO2 26 21 - 32 mmol/L    Anion gap 9 3.0 - 18 mmol/L    Glucose 127 (H) 74 - 99 mg/dL    BUN 7 7.0 - 18 MG/DL    Creatinine 1.00 0.6 - 1.3 MG/DL    BUN/Creatinine ratio 7 (L) 12 - 20      GFR est AA >60 >60 ml/min/1.73m2    GFR est non-AA 59 (L) >60 ml/min/1.73m2    Calcium 9.2 8.5 - 10.1 MG/DL    Bilirubin, total 0.2 0.2 - 1.0 MG/DL    ALT (SGPT) 29 13 - 56 U/L    AST (SGOT) 18 15 - 37 U/L    Alk.  phosphatase 131 (H) 45 - 117 U/L    Protein, total 8.1 6.4 - 8.2 g/dL    Albumin 3.8 3.4 - 5.0 g/dL    Globulin 4.3 (H) 2.0 - 4.0 g/dL    A-G Ratio 0.9 0.8 - 1.7     HCG URINE, QL    Collection Time: 06/14/18 10:01 AM   Result Value Ref Range    HCG urine, QL NEGATIVE  NEG     URINALYSIS W/ RFLX MICROSCOPIC    Collection Time: 06/14/18 10:07 AM Result Value Ref Range    Color YELLOW      Appearance CLOUDY      Specific gravity 1.010 1.005 - 1.030      pH (UA) 6.5 5.0 - 8.0      Protein NEGATIVE  NEG mg/dL    Glucose NEGATIVE  NEG mg/dL    Ketone NEGATIVE  NEG mg/dL    Bilirubin NEGATIVE  NEG      Blood NEGATIVE  NEG      Urobilinogen 0.2 0.2 - 1.0 EU/dL    Nitrites NEGATIVE  NEG      Leukocyte Esterase SMALL (A) NEG     URINE MICROSCOPIC ONLY    Collection Time: 06/14/18 10:07 AM   Result Value Ref Range    WBC 2 to 4 0 - 4 /hpf    RBC 0 to 2 0 - 5 /hpf    Epithelial cells 2+ 0 - 5 /lpf    Bacteria FEW (A) NEG /hpf       Radiologic Studies -   CT ABD PELV W CONT   Final Result        IMPRESSIONS:           Uterus appears to prominent in size. There is hypodensity within endometrial  cavity with thickness of 2.1 cm. Findings suggestive of left-sided  subendometrial fibroid in uterus.     Right ovary appears to mildly prominent in size due to cyst measuring 2 cm in  diameter. There is no obvious free fluid or inflammatory process around right  ovary. No free fluid in pelvic cul-de-sac.     For further diversion, complete pelvic ultrasound is recommended.     Evidence of previous cholecystectomy.     Normal appendix visualized.     Moderate to large amount of retained stool in colon.     Small fat-containing umbilical hernia. Medical Decision Making   I am the first provider for this patient. I reviewed the vital signs, available nursing notes, past medical history, past surgical history, family history and social history. Vital Signs-Reviewed the patient's vital signs. Cardiac Monitor:  Rate: 70  Rhythm:  Normal Sinus Rhythm        Records Reviewed: Nursing Notes (Time of Review: 9:30 AM)    ED Course: Progress Notes, Reevaluation, and Consults:  Patient had marked improvement with IV fluids and IV Toradol. At time of discharge she had no focal abdominal tenderness, was resting comfortably, and tolerating oral intake.  She agreed to follow-up for repeat blood pressure checks for hypertension when no longer acutely ill but blood pressure had normalized to her baseline with pain control. Patient understood that an early or occult intraabdominal infection or obstruction could not be entirely excluded but agreed with trial of continued outpatient symptomatic treatment and close follow-up. She will return immediately for any worsening. She was given a copy of her CT scan showing ovarian cyst and constipation. I counseled her on excessive laxative usage. Precautions given. Provider Notes (Medical Decision Making): Patient with acute right lower back pain most c/w musculoskeletal etiology but is having some G-I symptoms that may be related or associated with a separate issue of constipation. Will control pain and reexamine while evaluating for leukocytosis, anemia, electrolyte abnormality, endocrine emergency, UTI, and renal dysfunction. Diverticulitis, appendicitis, ovarian cyst/torsion, and ureterolithiasis remain in the differential diagnosis. Will control blood pressure but no signs of vascular or hypetensive emergency on initial examination. Diagnosis     Clinical Impression:   1. Acute right flank pain    2. Cyst of right ovary    3. Constipation, unspecified constipation type        Disposition: Discharge    Follow-up Information     Follow up With Details Comments Contact Info    [facility] EM[facility]ENCY DEPT  As needed, If symptoms worsen [address] [facility],[address] Floor, [place], [place] In 3 days As needed, If symptoms are not improving [address] [facility] [id number] [phone]             Patient's Medications   Start Taking    ACETAMINOPHEN (TYLENOL EXTRA STRENGTH) 500 MG TABLET    Take 1-2 Tabs by mouth every six (6) hours as needed for Pain. IBUPROFEN (MOTRIN) 800 MG TABLET    Take 1 Tab by mouth every eight (8) hours as needed for Pain for up to 7 days.     MAGNESIUM CITRATE SOLUTION    Take 1/3 of bottle every 8 hours until finished or until you have a bowel movement. POLYETHYLENE GLYCOL (MIRALAX) 17 GRAM/DOSE POWDER    Take 17 g by mouth daily. 1 tablespoon with 8 oz of water daily   Continue Taking    ASCORBIC ACID (VITAMIN C) 500 MG TABLET    Take 500 mg by mouth daily. B.INFANTIS-B. ANI-B. LONG-B.BIFI (PROBIOTIC 4X) 10-15 MG TBEC    Take  by mouth daily. BLOOD-GLUCOSE METER MONITORING KIT    Test blood sugar once a day .00 One Touch Ultra Mini Glucose Meter    CYANOCOBALAMIN 1,000 MCG TABLET    Take 1,000 mcg by mouth daily. FERROUS SULFATE (SLOW RELEASE IRON) 140 MG (45 MG IRON) TBER    Take  by mouth every other day. FLUTICASONE (FLONASE) 50 MCG/ACTUATION NASAL SPRAY    2 Sprays by Both Nostrils route daily. GLUCOSE BLOOD VI TEST STRIPS (ASCENSIA AUTODISC VI, ONE TOUCH ULTRA TEST VI) STRIP    Test blood sugar once daily .00 One Touch Ultra Mini Test strip    JANUMET  MG PER TABLET    TAKE ONE TABLET BY MOUTH TWICE DAILY WITH MEALS    LANCETS MISC    Use with One Touch Ultra Mini Glucose Meter test blood sugar once daily .00    LISINOPRIL-HYDROCHLOROTHIAZIDE (PRINZIDE, ZESTORETIC) 10-12.5 MG PER TABLET    TAKE ONE TABLET BY MOUTH ONCE DAILY    LORATADINE (CLARITIN) 10 MG TABLET    Take 1 Tab by mouth daily. MECLIZINE (ANTIVERT) 12.5 MG TABLET    Take 1 Tab by mouth three (3) times daily as needed. MONTELUKAST (SINGULAIR) 10 MG TABLET    Take 1 Tab by mouth daily. MULTI-VITAMIN PO    Take  by mouth daily. NAPROXEN (NAPROSYN) 500 MG TABLET    TAKE ONE TABLET BY MOUTH TWICE DAILY WITH MEALS    ONDANSETRON (ZOFRAN ODT) 8 MG DISINTEGRATING TABLET    Take 1 Tab by mouth every eight (8) hours as needed for Nausea. PHENTERMINE (ADIPEX-P) 37.5 MG TABLET    Take 1 Tab by mouth every morning. Max Daily Amount: 37.5 mg.    TRIAMCINOLONE ACETONIDE (KENALOG) 0.1 % TOPICAL CREAM    Apply  to affected area two (2) times a day.  use thin layer    VENTOLIN HFA 90 MCG/ACTUATION INHALER    INHALE TWO PUFFS BY MOUTH EVERY 4 HOURS AS NEEDED FOR WHEEZING   These Medications have changed    No medications on file   Stop Taking    No medications on file     _______________________________    Attestations:  Scribe [address] [place] [address] acting as a scribe for and in the presence of Lisa Ramirez MD      June 14, 2018 at 9:30 AM       Provider Attestation:      I personally performed the services described in the documentation, reviewed the documentation, as recorded by the scribe in my presence, and it accurately and completely records my words and actions.  June 14, 2018 at 9:30 AM - Lisa Ramirez MD    _______________________________

## 2021-10-03 DIAGNOSIS — L30.9 DERMATITIS: ICD-10-CM

## 2021-10-05 RX ORDER — TRIAMCINOLONE ACETONIDE 1 MG/G
CREAM TOPICAL
Qty: 80 G | Refills: 3 | Status: SHIPPED | OUTPATIENT
Start: 2021-10-05

## 2021-10-11 DIAGNOSIS — I10 ESSENTIAL HYPERTENSION: ICD-10-CM

## 2021-10-11 RX ORDER — ALISKIREN 300 MG/1
300 TABLET, FILM COATED ORAL DAILY
Qty: 30 TABLET | Refills: 4 | Status: SHIPPED | OUTPATIENT
Start: 2021-10-11 | End: 2022-09-09

## 2021-10-11 NOTE — TELEPHONE ENCOUNTER
Pt called stating that she is currently out medication aliskiren. Pt states she has a prescription coming through [facility] delivery. Pt does not have any medication to take tomorrow.  Please advise

## 2021-10-26 ENCOUNTER — HOSPITAL ENCOUNTER (OUTPATIENT)
Dept: LAB | Age: 51
Discharge: HOME | End: 2021-10-26

## 2021-10-26 LAB — SENTARA SPECIMEN COL,SENBCF: NORMAL

## 2021-10-26 PROCEDURE — 99001 SPECIMEN HANDLING PT-LAB: CPT

## 2021-10-27 LAB
A-G RATIO,AGRAT: 1.4 RATIO (ref 1.1–2.6)
ALBUMIN SERPL-MCNC: 4.1 G/DL (ref 3.5–5)
ALP SERPL-CCNC: 127 U/L (ref 25–115)
ALT SERPL-CCNC: 28 U/L (ref 5–40)
ANION GAP SERPL CALC-SCNC: 11 MMOL/L (ref 3–15)
AST SERPL W P-5'-P-CCNC: 20 U/L (ref 10–37)
AVG GLU, 10930: 217 MG/DL (ref 91–123)
BILIRUB SERPL-MCNC: 0.1 MG/DL (ref 0.2–1.2)
BUN SERPL-MCNC: 17 MG/DL (ref 6–22)
CALCIUM SERPL-MCNC: 9.7 MG/DL (ref 8.4–10.5)
CHLORIDE SERPL-SCNC: 103 MMOL/L (ref 98–110)
CHOLEST SERPL-MCNC: 220 MG/DL (ref 110–200)
CO2 SERPL-SCNC: 27 MMOL/L (ref 20–32)
CREAT SERPL-MCNC: 0.8 MG/DL (ref 0.5–1.2)
CREATININE, URINE: 115 MG/DL
GFRAA, 66117: >60
GFRNA, 66118: >60
GLOBULIN,GLOB: 3 G/DL (ref 2–4)
GLUCOSE SERPL-MCNC: 191 MG/DL (ref 70–99)
HBA1C MFR BLD HPLC: 9.2 % (ref 4.8–5.6)
HDLC SERPL-MCNC: 4.9 MG/DL (ref 0–5)
HDLC SERPL-MCNC: 45 MG/DL
LDL/HDL RATIO,LDHD: 3.3
LDLC SERPL CALC-MCNC: 149 MG/DL (ref 50–99)
MICROALB/CREAT RATIO, 140286: 35.7 (ref 0–30)
MICROALBUMIN,URINE RANDOM 140054: 41 MG/L (ref 0.1–17)
NON-HDL CHOLESTEROL, 011976: 176 MG/DL
POTASSIUM SERPL-SCNC: 3.9 MMOL/L (ref 3.5–5.5)
PROT SERPL-MCNC: 7.1 G/DL (ref 6.4–8.3)
SODIUM SERPL-SCNC: 141 MMOL/L (ref 133–145)
TRIGL SERPL-MCNC: 133 MG/DL (ref 40–149)
VLDLC SERPL CALC-MCNC: 27 MG/DL (ref 8–30)

## 2021-10-29 ENCOUNTER — VIRTUAL VISIT (OUTPATIENT)
Dept: FAMILY MEDICINE CLINIC | Age: 51
End: 2021-10-29
Payer: COMMERCIAL

## 2021-10-29 DIAGNOSIS — E11.9 TYPE 2 DIABETES MELLITUS WITHOUT COMPLICATION, WITHOUT LONG-TERM CURRENT USE OF INSULIN (HCC): Primary | ICD-10-CM

## 2021-10-29 DIAGNOSIS — E11.69 HYPERLIPIDEMIA ASSOCIATED WITH TYPE 2 DIABETES MELLITUS (HCC): ICD-10-CM

## 2021-10-29 DIAGNOSIS — I10 ESSENTIAL HYPERTENSION: ICD-10-CM

## 2021-10-29 DIAGNOSIS — E78.5 HYPERLIPIDEMIA ASSOCIATED WITH TYPE 2 DIABETES MELLITUS (HCC): ICD-10-CM

## 2021-10-29 DIAGNOSIS — M25.50 ARTHRALGIA, UNSPECIFIED JOINT: ICD-10-CM

## 2021-10-29 PROCEDURE — 99214 OFFICE O/P EST MOD 30 MIN: CPT | Performed by: FAMILY MEDICINE

## 2021-10-29 RX ORDER — NAPROXEN 500 MG/1
TABLET ORAL
Qty: 60 TABLET | Refills: 5 | Status: SHIPPED | OUTPATIENT
Start: 2021-10-29 | End: 2022-05-23

## 2021-10-29 NOTE — PROGRESS NOTES
Noemy Tom is a 45 y.o. female who was seen by synchronous (real-time) audio-video technology on 10/29/2021 for Cholesterol Problem (high chol), Diabetes, Allergic Rhinitis, and Hypertension        Assessment & Plan:   Diagnoses and all orders for this visit:    1. Type 2 diabetes mellitus without complication, without long-term current use of insulin (HCC)  -     METABOLIC PANEL, COMPREHENSIVE; Future  -     LIPID PANEL; Future  -     HEMOGLOBIN A1C WITH EAG; Future  -     MICROALBUMIN, UR, RAND W/ MICROALB/CREAT RATIO; Future    2. Essential hypertension  -     METABOLIC PANEL, COMPREHENSIVE; Future  -     LIPID PANEL; Future    3. Hyperlipidemia associated with type 2 diabetes mellitus (HCC)  -     METABOLIC PANEL, COMPREHENSIVE; Future  -     LIPID PANEL; Future    work on diet. Cont exercise. The complexity of medical decision making for this visit is moderate   Follow-up and Dispositions    · Return in about 3 months (around 1/29/2022) for diabetes, high blood pressure, high cholesterol. 712  Subjective:   Patient contacted via doxy. me. Pt reports that there have been a lot of deaths in her family. This has been difficult. bp has improved. Labs reviewed. Pt feels she can work on diet. No new concerns. Prior to Admission medications    Medication Sig Start Date End Date Taking? Authorizing Provider   naproxen (NAPROSYN) 500 mg tablet TAKE 1 TABLET BY MOUTH TWICE DAILY WITH FOOD 10/29/21  Yes Trista Hernández MD   aliskiren (TEKTURNA) 300 mg tablet Take 1 Tablet by mouth daily.  10/11/21  Yes Jaida Boggs MD   triamcinolone acetonide (KENALOG) 0.1 % topical cream APPLY A THIN LAYER TO AFFECTED AREA TWICE DAILY 10/5/21  Yes Gill Boggs MD   glipiZIDE (GLUCOTROL) 5 mg tablet TAKE 1 TABLET BY MOUTH TWICE A DAY 8/27/21  Yes Gill Boggs MD   metFORMIN (GLUCOPHAGE) 1,000 mg tablet TAKE 1 TABLET BY MOUTH TWICE A DAY WITH MEALS 8/27/21  Yes Trista Hernández MD hydroCHLOROthiazide (HYDRODIURIL) 25 mg tablet TAKE 1 TABLET BY MOUTH EVERY DAY 8/27/21  Yes Ray Boggs MD   Januvia 100 mg tablet TAKE 1 TABLET BY MOUTH EVERY DAY 8/27/21  Yes Kandi Weiss MD   meclizine (ANTIVERT) 12.5 mg tablet Take 1 Tablet by mouth three (3) times daily as needed for Dizziness. 8/4/21  Yes Ray Boggs MD   Lastacaft 0.25 % drop INSTILL 1 DROP INTO EACH EYE ONCE DAILY 6/24/21  Yes Provider, Historical   ondansetron (ZOFRAN ODT) 8 mg disintegrating tablet Take 1 Tablet by mouth every eight (8) hours as needed for Nausea. 7/29/21  Yes Kandi Weiss MD   fluticasone propionate (FLONASE) 50 mcg/actuation nasal spray Use 2 spray(s) in each nostril once daily 1/6/21  Yes Ray Boggs MD   albuterol (Ventolin HFA) 90 mcg/actuation inhaler INHALE TWO PUFFS BY MOUTH EVERY 4 HOURS AS NEEDED FOR WHEEZING 10/6/20  Yes Ray Boggs MD   cyanocobalamin 1,000 mcg tablet Take 1,000 mcg by mouth daily. Yes Provider, Historical   Blood-Glucose Meter monitoring kit Test blood sugar once a day .00 One Touch Ultra Mini Glucose Meter 5/6/14  Yes Gill Boggs MD   glucose blood VI test strips (ASCENSIA AUTODISC VI, ONE TOUCH ULTRA TEST VI) strip Test blood sugar once daily .00 One Touch Ultra Mini Test strip 5/6/14  Yes Kandi Weiss MD   Lancets misc Use with One Touch Ultra Mini Glucose Meter test blood sugar once daily .00 5/6/14  Yes Ray Boggs MD   MULTI-VITAMIN PO Take  by mouth daily. 6/7/11  Yes Provider, Historical   ascorbic acid (VITAMIN C) 500 mg tablet Take 500 mg by mouth daily.  6/7/11  Yes Provider, Historical   epinastine 0.05 % drop INSTILL 1 DROP INTO EACH EYE TWICE DAILY  Patient not taking: Reported on 7/29/2021 4/1/21   Provider, Historical     Patient Active Problem List   Diagnosis Code    Eczema L30.9    AR (allergic rhinitis) J30.9    Mixed hyperlipidemia E78.2    Anemia, unspecified D64.9    Type 2 diabetes mellitus without complication, without long-term current use of insulin ([facility]) E11.9    Allergic rhinitis J30.9    Essential hypertension with goal blood pressure less than 130/80 I10    Type 2 diabetes with nephropathy ([facility]) E11.21    Severe obesity (BMI 35.0-39. 9) E66.01    DUB (dysfunctional uterine bleeding) N93.8    Leiomyoma of uterus D25.9    White coat syndrome with hypertension I10    Eustachian tube dysfunction, bilateral H69.83    Vertigo R42    Benign paroxysmal positional vertigo due to bilateral vestibular disorder H81.13    Finger mass, left R22.32     Current Outpatient Medications   Medication Sig Dispense Refill    naproxen (NAPROSYN) 500 mg tablet TAKE 1 TABLET BY MOUTH TWICE DAILY WITH FOOD 60 Tablet 5    aliskiren (TEKTURNA) 300 mg tablet Take 1 Tablet by mouth daily. 30 Tablet 4    triamcinolone acetonide (KENALOG) 0.1 % topical cream APPLY A THIN LAYER TO AFFECTED AREA TWICE DAILY 80 g 3    glipiZIDE (GLUCOTROL) 5 mg tablet TAKE 1 TABLET BY MOUTH TWICE A DAY 60 Tablet 5    metFORMIN (GLUCOPHAGE) 1,000 mg tablet TAKE 1 TABLET BY MOUTH TWICE A DAY WITH MEALS 60 Tablet 5    hydroCHLOROthiazide (HYDRODIURIL) 25 mg tablet TAKE 1 TABLET BY MOUTH EVERY DAY 30 Tablet 5    Januvia 100 mg tablet TAKE 1 TABLET BY MOUTH EVERY DAY 30 Tablet 5    meclizine (ANTIVERT) 12.5 mg tablet Take 1 Tablet by mouth three (3) times daily as needed for Dizziness. 30 Tablet 1    Lastacaft 0.25 % drop INSTILL 1 DROP INTO EACH EYE ONCE DAILY      ondansetron (ZOFRAN ODT) 8 mg disintegrating tablet Take 1 Tablet by mouth every eight (8) hours as needed for Nausea. 12 Tablet 3    fluticasone propionate (FLONASE) 50 mcg/actuation nasal spray Use 2 spray(s) in each nostril once daily 16 g 12    albuterol (Ventolin HFA) 90 mcg/actuation inhaler INHALE TWO PUFFS BY MOUTH EVERY 4 HOURS AS NEEDED FOR WHEEZING 1 Inhaler 5    cyanocobalamin 1,000 mcg tablet Take 1,000 mcg by mouth daily.       Blood-Glucose Meter monitoring kit Test blood sugar once a day .00 One Touch Ultra Mini Glucose Meter 1 Kit 0    glucose blood VI test strips (ASCENSIA AUTODISC VI, ONE TOUCH ULTRA TEST VI) strip Test blood sugar once daily .00 One Touch Ultra Mini Test strip 1 Package 11    Lancets misc Use with One Touch Ultra Mini Glucose Meter test blood sugar once daily .00 1 Package 11    MULTI-VITAMIN PO Take  by mouth daily.  ascorbic acid (VITAMIN C) 500 mg tablet Take 500 mg by mouth daily.       epinastine 0.05 % drop INSTILL 1 DROP INTO EACH EYE TWICE DAILY (Patient not taking: Reported on 2021)       Allergies   Allergen Reactions    Codeine Nausea Only and Other (comments)     Sweats felt like she was going to pass out    Procardia [Nifedipine] Shortness of Breath, Nausea and Vomiting and Other (comments)     Tightness in chest on 1990    Sulfa (Sulfonamide Antibiotics) Rash    Tylox [Oxycodone-Acetaminophen] Nausea Only and Other (comments)     Sweats, felt like she was going to pass out   [employer] With Codeine  [Acetaminophen-Codeine] Unknown (comments)     Past Medical History:   Diagnosis Date    Anemia NEC ///    during pregnancy    AR (allergic rhinitis) 2011    Eczema     since childhood    Gestational diabetes 1993    x1 pregnancy    Hypertension     Type 2 diabetes mellitus without complication, without long-term current use of insulin ([address] 2016     Past Surgical History:   Procedure Laterality Date    DELIVERY   [id number]    x4    HX TOTAL ABDOMINAL HYSTERECTOMY  10/2018    abd hyst with BSO; cervix was left due to scar tissue    MN ABDOMEN SURGERY PROC UNLISTED      Explore lap at 10 y/o    MN LAP,CHOLECYSTECTOMY/EXPLORE      MN REMOVAL GALLBLADDER       Family History   Problem Relation Age of Onset    Hypertension Mother     Cancer Mother 61        breast    Other Mother         diverticulosis    Allergic Rhinitis Mother    Leyla Mosley Breast Cancer Mother     Cancer Father 32        glands, neck    Cancer Maternal Grandmother         breast-brain    Breast Cancer Maternal Grandmother     Diabetes Maternal Grandfather     Hypertension Maternal Grandfather     Other Paternal Grandmother         narcolepsy    Eczema Daughter     Asthma Daughter     Allergic Rhinitis Daughter     Eczema Daughter     Allergic Rhinitis Daughter     Allergic Rhinitis Daughter     Allergic Rhinitis Daughter      Social History     Tobacco Use    Smoking status: Never Smoker    Smokeless tobacco: Never Used   Substance Use Topics    Alcohol use: Yes     Comment: rare       Review of Systems   Constitutional: Negative. HENT: Negative. Respiratory: Negative. Cardiovascular: Negative. All other systems reviewed and are negative. Objective:     Patient-Reported Vitals 4/21/2021   Patient-Reported Weight 215.8   Patient-Reported Pulse 68   Patient-Reported Systolic  879   Patient-Reported Diastolic 69      General: alert, cooperative, no distress   Mental  status: normal mood, behavior, speech, dress, motor activity, and thought processes, able to follow commands   HENT: NCAT   Neck: no visualized mass   Resp: no respiratory distress   Neuro: no gross deficits   Skin: no discoloration or lesions of concern on visible areas   Psychiatric: normal affect, consistent with stated mood, no evidence of hallucinations     Additional exam findings: none      We discussed the expected course, resolution and complications of the diagnosis(es) in detail. Medication risks, benefits, costs, interactions, and alternatives were discussed as indicated. I advised her to contact the office if her condition worsens, changes or fails to improve as anticipated. She expressed understanding with the diagnosis(es) and plan. Parmjit Denise, was evaluated through a synchronous (real-time) audio-video encounter.  The patient (or guardian if applicable) is aware that this is a billable service. Verbal consent to proceed has been obtained within the past 12 months. The visit was conducted pursuant to the emergency declaration under the [facility] [address] authority and the [employer] General Act. Patient identification was verified, and a caregiver was present when appropriate. The patient was located in a state where the provider was credentialed to provide care.     Ryan Samuel MD

## 2021-10-29 NOTE — PROGRESS NOTES
Lalit Si presents today for   Chief Complaint   Patient presents with    Cholesterol Problem     high chol    Diabetes    Allergic Rhinitis    Hypertension       Christian Chen preferred language for health care discussion is english/other. Is someone accompanying this pt? no    Is the patient using any DME equipment during [address]? no    Depression Screening:  3 most recent PHQ Screens 10/29/2021   PHQ Not Done -   Little interest or pleasure in doing things Not at all   Feeling down, depressed, irritable, or hopeless Not at all   Total Score PHQ 2 0   Trouble falling or staying asleep, or sleeping too much -   Feeling tired or having little energy -   Poor appetite, weight loss, or overeating -   Feeling bad about yourself - or that you are a failure or have let yourself or your family down -   Trouble concentrating on things such as school, work, reading, or watching TV -   Moving or speaking so slowly that other people could have noticed; or the opposite being so fidgety that others notice -   Thoughts of being better off dead, or hurting yourself in some way -   PHQ 9 Score -   How difficult have these problems made it for you to do your work, take care of your home and get along with others -       Learning Assessment:  Learning Assessment 4/21/2021   PRIMARY LEARNER Patient   HIGHEST LEVEL OF EDUCATION - PRIMARY LEARNER  > 4 YEARS OF COLLEGE   BARRIERS PRIMARY LEARNER NONE   CO-LEARNER CAREGIVER No   PRIMARY LANGUAGE ENGLISH   LEARNER PREFERENCE PRIMARY LISTENING   ANSWERED BY Patient   RELATIONSHIP SELF       Abuse Screening:  Abuse Screening Questionnaire 4/21/2021   Do you ever feel afraid of your partner? N   Are you in a relationship with someone who physically or mentally threatens you? N   Is it safe for you to go home? Y       Generalized Anxiety  No flowsheet data found.       Health Maintenance Due   Topic Date Due    Hepatitis C Screening  Never done    Foot Exam Q1  Never done    Shingrix Vaccine Age 50> (1 of 2) Never done    Flu Vaccine (1) 09/01/2021   . Health Maintenance reviewed and discussed and ordered per Provider. Coordination of Care:  1. Have you been to the ER, urgent care clinic since your last visit? Hospitalized since your last visit? no    2. Have you seen or consulted any other health care providers outside of the [address] [place] [address] since your last visit? Include any pap smears or colon screening.  no

## 2022-01-20 ENCOUNTER — HOSPITAL ENCOUNTER (OUTPATIENT)
Dept: LAB | Age: 52
Discharge: HOME | End: 2022-01-20

## 2022-01-20 LAB — SENTARA SPECIMEN COL,SENBCF: NORMAL

## 2022-01-20 PROCEDURE — 99001 SPECIMEN HANDLING PT-LAB: CPT

## 2022-01-21 LAB
A-G RATIO,AGRAT: 1.2 RATIO (ref 1.1–2.6)
ALBUMIN SERPL-MCNC: 4 G/DL (ref 3.5–5)
ALP SERPL-CCNC: 112 U/L (ref 25–115)
ALT SERPL-CCNC: 62 U/L (ref 5–40)
ANION GAP SERPL CALC-SCNC: 12 MMOL/L (ref 3–15)
AST SERPL W P-5'-P-CCNC: 38 U/L (ref 10–37)
AVG GLU, 10930: 234 MG/DL (ref 91–123)
BILIRUB SERPL-MCNC: 0.1 MG/DL (ref 0.2–1.2)
BUN SERPL-MCNC: 12 MG/DL (ref 6–22)
CALCIUM SERPL-MCNC: 9.5 MG/DL (ref 8.4–10.5)
CHLORIDE SERPL-SCNC: 100 MMOL/L (ref 98–110)
CHOLEST SERPL-MCNC: 193 MG/DL (ref 110–200)
CO2 SERPL-SCNC: 26 MMOL/L (ref 20–32)
CREAT SERPL-MCNC: 1.1 MG/DL (ref 0.5–1.2)
CREATININE, URINE: 218 MG/DL
GFRAA, 66117: >60
GFRNA, 66118: 50.1
GLOBULIN,GLOB: 3.3 G/DL (ref 2–4)
GLUCOSE SERPL-MCNC: 231 MG/DL (ref 70–99)
HBA1C MFR BLD HPLC: 9.8 % (ref 4.8–5.6)
HDLC SERPL-MCNC: 39 MG/DL
HDLC SERPL-MCNC: 4.9 MG/DL (ref 0–5)
LDL/HDL RATIO,LDHD: 3
LDLC SERPL CALC-MCNC: 116 MG/DL (ref 50–99)
MICROALB/CREAT RATIO, 140286: 71.1 (ref 0–30)
MICROALBUMIN,URINE RANDOM 140054: 155 MG/L (ref 0.1–17)
NON-HDL CHOLESTEROL, 011976: 155 MG/DL
POTASSIUM SERPL-SCNC: 4.6 MMOL/L (ref 3.5–5.5)
PROT SERPL-MCNC: 7.3 G/DL (ref 6.4–8.3)
SODIUM SERPL-SCNC: 138 MMOL/L (ref 133–145)
TRIGL SERPL-MCNC: 194 MG/DL (ref 40–149)
VLDLC SERPL CALC-MCNC: 39 MG/DL (ref 8–30)

## 2022-01-23 ENCOUNTER — APPOINTMENT (OUTPATIENT)
Dept: GENERAL RADIOLOGY | Age: 52
End: 2022-01-23
Attending: STUDENT IN AN ORGANIZED HEALTH CARE EDUCATION/TRAINING PROGRAM
Payer: COMMERCIAL

## 2022-01-23 ENCOUNTER — HOSPITAL ENCOUNTER (EMERGENCY)
Age: 52
Discharge: HOME | End: 2022-01-23
Attending: STUDENT IN AN ORGANIZED HEALTH CARE EDUCATION/TRAINING PROGRAM
Payer: COMMERCIAL

## 2022-01-23 VITALS
HEIGHT: 66 IN | BODY MASS INDEX: 34.72 KG/M2 | WEIGHT: 216 LBS | TEMPERATURE: 98.3 F | RESPIRATION RATE: 17 BRPM | SYSTOLIC BLOOD PRESSURE: 171 MMHG | DIASTOLIC BLOOD PRESSURE: 91 MMHG | OXYGEN SATURATION: 99 % | HEART RATE: 77 BPM

## 2022-01-23 DIAGNOSIS — Z20.822 SUSPECTED COVID-19 VIRUS INFECTION: Primary | ICD-10-CM

## 2022-01-23 DIAGNOSIS — J30.1 SEASONAL ALLERGIC RHINITIS DUE TO POLLEN: ICD-10-CM

## 2022-01-23 DIAGNOSIS — R05.9 COUGH: ICD-10-CM

## 2022-01-23 DIAGNOSIS — R09.81 NASAL CONGESTION: ICD-10-CM

## 2022-01-23 LAB
ALBUMIN SERPL-MCNC: 3.8 G/DL (ref 3.4–5)
ALBUMIN/GLOB SERPL: 0.8 {RATIO} (ref 0.8–1.7)
ALP SERPL-CCNC: 128 U/L (ref 45–117)
ALT SERPL-CCNC: 79 U/L (ref 13–56)
ANION GAP SERPL CALC-SCNC: 6 MMOL/L (ref 3–18)
AST SERPL-CCNC: 32 U/L (ref 10–38)
BASOPHILS # BLD: 0 K/UL (ref 0–0.1)
BASOPHILS NFR BLD: 0 % (ref 0–2)
BILIRUB SERPL-MCNC: 0.3 MG/DL (ref 0.2–1)
BUN SERPL-MCNC: 14 MG/DL (ref 7–18)
BUN/CREAT SERPL: 12 (ref 12–20)
CALCIUM SERPL-MCNC: 9.4 MG/DL (ref 8.5–10.1)
CHLORIDE SERPL-SCNC: 102 MMOL/L (ref 100–111)
CO2 SERPL-SCNC: 27 MMOL/L (ref 21–32)
CREAT SERPL-MCNC: 1.13 MG/DL (ref 0.6–1.3)
DIFFERENTIAL METHOD BLD: ABNORMAL
EOSINOPHIL # BLD: 0 K/UL (ref 0–0.4)
EOSINOPHIL NFR BLD: 0 % (ref 0–5)
ERYTHROCYTE [DISTWIDTH] IN BLOOD BY AUTOMATED COUNT: 15.9 % (ref 11.6–14.5)
GLOBULIN SER CALC-MCNC: 4.6 G/DL (ref 2–4)
GLUCOSE SERPL-MCNC: 245 MG/DL (ref 74–99)
HCT VFR BLD AUTO: 39.5 % (ref 35–45)
HGB BLD-MCNC: 12.3 G/DL (ref 12–16)
IMM GRANULOCYTES # BLD AUTO: 0 K/UL (ref 0–0.04)
IMM GRANULOCYTES NFR BLD AUTO: 0 % (ref 0–0.5)
LYMPHOCYTES # BLD: 1.7 K/UL (ref 0.9–3.6)
LYMPHOCYTES NFR BLD: 42 % (ref 21–52)
MCH RBC QN AUTO: 24.6 PG (ref 24–34)
MCHC RBC AUTO-ENTMCNC: 31.1 G/DL (ref 31–37)
MCV RBC AUTO: 78.8 FL (ref 78–100)
MONOCYTES # BLD: 0.3 K/UL (ref 0.05–1.2)
MONOCYTES NFR BLD: 8 % (ref 3–10)
NEUTS SEG # BLD: 2 K/UL (ref 1.8–8)
NEUTS SEG NFR BLD: 50 % (ref 40–73)
NRBC # BLD: 0 K/UL (ref 0–0.01)
NRBC BLD-RTO: 0 PER 100 WBC
PLATELET # BLD AUTO: 274 K/UL (ref 135–420)
PMV BLD AUTO: 10.7 FL (ref 9.2–11.8)
POTASSIUM SERPL-SCNC: 3.8 MMOL/L (ref 3.5–5.5)
PROT SERPL-MCNC: 8.4 G/DL (ref 6.4–8.2)
RBC # BLD AUTO: 5.01 M/UL (ref 4.2–5.3)
SARS-COV-2, COV2: NORMAL
SODIUM SERPL-SCNC: 135 MMOL/L (ref 136–145)
WBC # BLD AUTO: 4.1 K/UL (ref 4.6–13.2)

## 2022-01-23 PROCEDURE — 85025 COMPLETE CBC W/AUTO DIFF WBC: CPT

## 2022-01-23 PROCEDURE — U0003 INFECTIOUS AGENT DETECTION BY NUCLEIC ACID (DNA OR RNA); SEVERE ACUTE RESPIRATORY SYNDROME CORONAVIRUS 2 (SARS-COV-2) (CORONAVIRUS DISEASE [COVID-19]), AMPLIFIED PROBE TECHNIQUE, MAKING USE OF HIGH THROUGHPUT TECHNOLOGIES AS DESCRIBED BY CMS-2020-01-R: HCPCS

## 2022-01-23 PROCEDURE — 71045 X-RAY EXAM CHEST 1 VIEW: CPT

## 2022-01-23 PROCEDURE — 80053 COMPREHEN METABOLIC PANEL: CPT

## 2022-01-23 PROCEDURE — 99284 EMERGENCY DEPT VISIT MOD MDM: CPT

## 2022-01-23 PROCEDURE — 93005 ELECTROCARDIOGRAM TRACING: CPT

## 2022-01-23 RX ORDER — FLUTICASONE PROPIONATE 50 MCG
2 SPRAY, SUSPENSION (ML) NASAL DAILY
Qty: 16 G | Refills: 0 | Status: SHIPPED | OUTPATIENT
Start: 2022-01-23 | End: 2022-01-25

## 2022-01-23 RX ORDER — AZITHROMYCIN 250 MG/1
TABLET, FILM COATED ORAL
Qty: 6 TABLET | Refills: 0 | Status: SHIPPED | OUTPATIENT
Start: 2022-01-23 | End: 2022-01-28

## 2022-01-23 RX ORDER — GLUCOSAMINE SULFATE 1500 MG
1000 POWDER IN PACKET (EA) ORAL DAILY
COMMUNITY

## 2022-01-23 RX ORDER — BENZONATATE 100 MG/1
100 CAPSULE ORAL
Qty: 30 CAPSULE | Refills: 0 | Status: SHIPPED | OUTPATIENT
Start: 2022-01-23 | End: 2022-01-30

## 2022-01-23 NOTE — ED NOTES
Pt sitting in bed looking at phone with nad noted. Fall precautions remain in place. Call bell in reach.

## 2022-01-23 NOTE — ED NOTES
Pt presents a/o x 4 with nad noted. Pt has c/o dizziness, \"not feeling well\", productive cough with light colored sputum. Pt reports feeling bad x 10 days.

## 2022-01-23 NOTE — ED TRIAGE NOTES
Patient c/o cough, shortness of breath, vertigo and headache x 10 days. Denies being vaccinated against covid. Denies being tested for covid. She states that her daughter is sick with cold-like symptoms and that her grand kids were recently sick.

## 2022-01-23 NOTE — DISCHARGE INSTRUCTIONS
[facility] Guidance for Preventing the Spread of Coronavirus Disease 2019 (COVID-19) in Homes and Residential Communities  Prevention steps for:  People with confirmed or suspected COVID-19 (including persons under investigation) who do not need to be hospitalized  and  People with confirmed COVID-19 who were hospitalized and determined to be medically stable to go home  Your healthcare provider and public health staff will evaluate whether you can be cared for at home. If it is determined that you do not need to be hospitalized and can be isolated at home, you will be monitored by staff from your local or state health department. You should follow the prevention steps below until a healthcare provider or local or state health department says you can return to your normal activities. Stay home except to get medical care  You should restrict activities outside your home, except for getting medical care. Do not go to work, school, or public areas. Avoid using public transportation, ride-sharing, or taxis. Separate yourself from other people and animals in your home  People: As much as possible, you should stay in a specific room and away from other people in your home. Also, you should use a separate bathroom, if available. Animals: You should restrict contact with pets and other animals while you are sick with COVID-19, just like you would around other people. Although there have not been reports of pets or other animals becoming sick with COVID-19, it is still recommended that people sick with COVID-19 limit contact with animals until more information is known about the virus. When possible, have another member of your household care for your animals while you are sick. If you are sick with COVID-19, avoid contact with your pet, including petting, snuggling, being kissed or licked, and sharing food.  If you must care for your pet or be around animals while you are sick, wash your hands before and after you interact with pets and wear a facemask. Call ahead before visiting your doctor  If you have a medical appointment, call the healthcare provider and tell them that you have or may have COVID-19. This will help the healthcare provider's office take steps to keep other people from getting infected or exposed. Wear a facemask  You should wear a facemask when you are around other people (e.g., sharing a room or vehicle) or pets and before you enter a healthcare provider's office. If you are not able to wear a facemask (for example, because it causes trouble breathing), then people who live with you should not stay in the same room with you, or they should wear a facemask if they enter your room. Cover your coughs and sneezes  Cover your mouth and nose with a tissue when you cough or sneeze. Throw used tissues in a lined trash can; immediately wash your hands with soap and water for at least 20 seconds or clean your hands with an alcohol-based hand  that contains 60 to 95% alcohol, covering all surfaces of your hands and rubbing them together until they feel dry. Soap and water should be used preferentially if hands are visibly dirty. Clean your hands often  Wash your hands often with soap and water for at least 20 seconds or clean your hands with an alcohol-based hand  that contains 60 to 95% alcohol, covering all surfaces of your hands and rubbing them together until they feel dry. Soap and water should be used preferentially if hands are visibly dirty. Avoid touching your eyes, nose, and mouth with unwashed hands. Avoid sharing personal household items  You should not share dishes, drinking glasses, cups, eating utensils, towels, or bedding with other people or pets in your home. After using these items, they should be washed thoroughly with soap and water.   Clean all high-touch surfaces everyday  High touch surfaces include counters, tabletops, doorknobs, bathroom fixtures, toilets, phones, keyboards, tablets, and bedside tables. Also, clean any surfaces that may have blood, stool, or body fluids on them. Use a household cleaning spray or wipe, according to the label instructions. Labels contain instructions for safe and effective use of the cleaning product including precautions you should take when applying the product, such as wearing gloves and making sure you have good ventilation during use of the product. Monitor your symptoms  Seek prompt medical attention if your illness is worsening (e.g., difficulty breathing). Before seeking care, call your healthcare provider and tell them that you have, or are being evaluated for, COVID-19. Put on a facemask before you enter the facility. These steps will help the healthcare provider's office to keep other people in the office or waiting room from getting infected or exposed. Ask your healthcare provider to call the local or state health department. Persons who are placed under active monitoring or facilitated self-monitoring should follow instructions provided by their local health department or occupational health professionals, as appropriate. If you have a medical emergency and need to call 911, notify the dispatch personnel that you have, or are being evaluated for COVID-19. If possible, put on a facemask before emergency medical services arrive. Discontinuing home isolation  Patients with confirmed COVID-19 should remain under home isolation precautions until the risk of secondary transmission to others is thought to be low. The decision to discontinue home isolation precautions should be made on a case-by-case basis, in consultation with healthcare providers and state and local health departments.   Recommended precautions for household members, intimate partners, and caregivers in a nonhealthcare setting of  A patient with symptomatic laboratory-confirmed COVID-19  or  A patient under investigation  Household members, intimate partners, and caregivers in a nonhealthcare setting may have close contact2 with a person with symptomatic, laboratory-confirmed COVID-19 or a person under investigation. Close contacts should monitor their health; they should call their healthcare provider right away if they develop symptoms suggestive of COVID-19 (e.g., fever, cough, shortness of breath)   Close contacts should also follow these recommendations:  Make sure that you understand and can help the patient follow their healthcare provider's instructions for medication(s) and care. You should help the patient with basic needs in the home and provide support for getting groceries, prescriptions, and other personal needs. Monitor the patient's symptoms. If the patient is getting sicker, call his or her healthcare provider and tell them that the patient has laboratory-confirmed COVID-19. This will help the healthcare provider's office take steps to keep other people in the office or waiting room from getting infected. Ask the healthcare provider to call the local or [facility] health department for additional guidance. If the patient has a medical emergency and you need to call 911, notify the dispatch personnel that the patient has, or is being evaluated for COVID-19. Household members should stay in another room or be  from the patient as much as possible. Household members should use a separate bedroom and bathroom, if available. Prohibit visitors who do not have an essential need to be in the home. Household members should care for any pets in the home. Do not handle pets or other animals while sick. Make sure that shared spaces in the home have good air flow, such as by an air conditioner or an opened window, weather permitting. Perform hand hygiene frequently.  Wash your hands often with soap and water for at least 20 seconds or use an alcohol-based hand  that contains 60 to 95% alcohol, covering all surfaces of your hands and rubbing them together until they feel dry. Soap and water should be used preferentially if hands are visibly dirty. Avoid touching your eyes, nose, and mouth with unwashed hands. You and the patient should wear a facemask if you are in the same room. Wear a disposable facemask and gloves when you touch or have contact with the patient's blood, stool, or body fluids, such as saliva, sputum, nasal mucus, vomit, urine. Throw out disposable facemasks and gloves after using them. Do not reuse. When removing personal protective equipment, first remove and dispose of gloves. Then, immediately clean your hands with soap and water or alcohol-based hand . Next, remove and dispose of facemask, and immediately clean your hands again with soap and water or alcohol-based hand . Avoid sharing household items with the patient. You should not share dishes, drinking glasses, cups, eating utensils, towels, bedding, or other items. After the patient uses these items, you should wash them thoroughly (see below [employer]). Clean all high-touch surfaces, such as counters, tabletops, doorknobs, bathroom fixtures, toilets, phones, keyboards, tablets, and bedside tables, every day. Also, clean any surfaces that may have blood, stool, or body fluids on them. Use a household cleaning spray or wipe, according to the label instructions. Labels contain instructions for safe and effective use of the cleaning product including precautions you should take when applying the product, such as wearing gloves and making sure you have good ventilation during use of the product. [address] thoroughly. Immediately remove and wash clothes or bedding that have blood, stool, or body fluids on them. Wear disposable gloves while handling soiled items and keep soiled items away from your body. Clean your hands (with soap and water or an alcohol-based hand ) immediately after removing your gloves.   Read and follow directions on labels of laundry or clothing items and detergent. In general, using a normal laundry detergent according to washing machine instructions and dry thoroughly using the warmest temperatures recommended on the clothing label. Place all used disposable gloves, facemasks, and other contaminated items in a lined container before disposing of them with other household waste. Clean your hands (with soap and water or an alcohol-based hand ) immediately after handling these items. Soap and water should be used preferentially if hands are visibly dirty. Discuss any additional questions with your state or local health department or healthcare provider. Footnotes  2Close contact is defined as--  a) being within approximately 6 feet (2 meters) of a COVID-19 case for a prolonged period of time; close contact can occur while caring for, living with, visiting, or sharing a health care waiting area or room with a COVID-19 case  - or -  b) having direct contact with infectious secretions of a COVID-19 case (e.g., being coughed on).   Page last reviewed: February 18, 2020   Content source: [facility] Division of Viral Diseases

## 2022-01-23 NOTE — ED PROVIDER NOTES
EMERGENCY DEPARTMENT HISTORY AND PHYSICAL EXAM    11:00 AM    Date: 1/23/2022  Patient Name: Guilherme Farnsworth    History of Presenting Illness     Chief Complaint   Patient presents with    Cough    Headache    Shortness of Breath    Dizziness       History Provided By: Patient  Location/Duration/Severity/Modifying factors   HPI  Guilherme Farnsworth is a 45 y.o. female with past no history of hypertension, diabetes, chronic vertigo presenting for evaluation of suspected COVID. She has not for the last 10 days she has not felt good, her primary complaint is a cough, productive of some white-colored sputum. She denies having any fevers or chills. She occasionally has some shortness of breath which she describes more as deep breathing than actually feeling winded. She says that she has nasal congestion and a sinus headache which typically triggers her dizziness. She denies any unilateral numbness, tingling or weakness, difficulty ambulating, and is currently not feeling any of her vertigo symptoms. She has not taken anything for her symptoms. Nothing makes her symptoms better or worse. She was exposed to her grandchildren which have a cold-like illness right now at the start of her illness, her daughter also is fighting off a cold. Nobody has been vaccinated against COVID, nobody has tested for COVID. Denies tobacco, alcohol or illicit drug use. PCP: Marco Rousseau MD    Current Outpatient Medications   Medication Sig Dispense Refill    cholecalciferol (Vitamin D3) 25 mcg (1,000 unit) cap Take 1,000 Units by mouth daily.  fluticasone propionate (FLONASE) 50 mcg/actuation nasal spray 2 Sprays by Both Nostrils route daily. 16 g 0    benzonatate (Tessalon Perles) 100 mg capsule Take 1 Capsule by mouth three (3) times daily as needed for Cough for up to 7 days.  30 Capsule 0    azithromycin (ZITHROMAX) 250 mg tablet Take as prescribed 6 Tablet 0    naproxen (NAPROSYN) 500 mg tablet TAKE 1 TABLET BY MOUTH TWICE DAILY WITH FOOD 60 Tablet 5    aliskiren (TEKTURNA) 300 mg tablet Take 1 Tablet by mouth daily. 30 Tablet 4    triamcinolone acetonide (KENALOG) 0.1 % topical cream APPLY A THIN LAYER TO AFFECTED AREA TWICE DAILY 80 g 3    glipiZIDE (GLUCOTROL) 5 mg tablet TAKE 1 TABLET BY MOUTH TWICE A DAY 60 Tablet 5    metFORMIN (GLUCOPHAGE) 1,000 mg tablet TAKE 1 TABLET BY MOUTH TWICE A DAY WITH MEALS 60 Tablet 5    hydroCHLOROthiazide (HYDRODIURIL) 25 mg tablet TAKE 1 TABLET BY MOUTH EVERY DAY 30 Tablet 5    Januvia 100 mg tablet TAKE 1 TABLET BY MOUTH EVERY DAY 30 Tablet 5    meclizine (ANTIVERT) 12.5 mg tablet Take 1 Tablet by mouth three (3) times daily as needed for Dizziness. 30 Tablet 1    Lastacaft 0.25 % drop INSTILL 1 DROP INTO EACH EYE ONCE DAILY      ondansetron (ZOFRAN ODT) 8 mg disintegrating tablet Take 1 Tablet by mouth every eight (8) hours as needed for Nausea. 12 Tablet 3    fluticasone propionate (FLONASE) 50 mcg/actuation nasal spray Use 2 spray(s) in each nostril once daily 16 g 12    albuterol (Ventolin HFA) 90 mcg/actuation inhaler INHALE TWO PUFFS BY MOUTH EVERY 4 HOURS AS NEEDED FOR WHEEZING 1 Inhaler 5    cyanocobalamin 1,000 mcg tablet Take 1,000 mcg by mouth daily.  MULTI-VITAMIN PO Take  by mouth daily.  Blood-Glucose Meter monitoring kit Test blood sugar once a day .00 One Touch Ultra Mini Glucose Meter 1 Kit 0    glucose blood VI test strips (ASCENSIA AUTODISC VI, ONE TOUCH ULTRA TEST VI) strip Test blood sugar once daily .00 One Touch Ultra Mini Test strip 1 Package 11    Lancets misc Use with One Touch Ultra Mini Glucose Meter test blood sugar once daily .00 1 Package 11    ascorbic acid (VITAMIN C) 500 mg tablet Take 500 mg by mouth daily.          Past History     Past Medical History:  Past Medical History:   Diagnosis Date    Anemia NEC 1987/1988/1990/1992    during pregnancy    AR (allergic rhinitis) 6/7/2011    Eczema     since childhood    Gestational diabetes 1993    x1 pregnancy    Hypertension     Type 2 diabetes mellitus without complication, without long-term current use of insulin ([address] 2016    Vertigo        Past Surgical History:  Past Surgical History:   Procedure Laterality Date    DELIVERY   [id number],[id number]    x4    HX TOTAL ABDOMINAL HYSTERECTOMY  10/2018    abd hyst with BSO; cervix was left due to scar tissue    DC ABDOMEN SURGERY PROC UNLISTED      Explore lap at 10 y/o    DC LAP,CHOLECYSTECTOMY/EXPLORE  1986    DC REMOVAL GALLBLADDER         Family History:  Family History   Problem Relation Age of Onset    Hypertension Mother     Cancer Mother 61        breast    Other Mother         diverticulosis    Allergic Rhinitis Mother     Breast Cancer Mother     Cancer Father 32        glands, neck    Cancer Maternal Grandmother         breast-brain    Breast Cancer Maternal Grandmother     Diabetes Maternal Grandfather     Hypertension Maternal Grandfather     Other Paternal Grandmother         narcolepsy    Eczema Daughter     Asthma Daughter     Allergic Rhinitis Daughter     Eczema Daughter     Allergic Rhinitis Daughter     Allergic Rhinitis Daughter     Allergic Rhinitis Daughter        Social History:  Social History     Tobacco Use    Smoking status: Never Smoker    Smokeless tobacco: Never Used   Substance Use Topics    Alcohol use: Not Currently     Comment: rare    Drug use: Not on file       Allergies:   Allergies   Allergen Reactions    Codeine Nausea Only and Other (comments)     Sweats felt like she was going to pass out    Procardia [Nifedipine] Shortness of Breath, Nausea and Vomiting and Other (comments)     Tightness in chest on 1990    Sulfa (Sulfonamide Antibiotics) Rash    Tylox [Oxycodone-Acetaminophen] Nausea Only and Other (comments)     Sweats, felt like she was going to pass out   [employer] With Codeine  [Acetaminophen-Codeine] Unknown (comments)       I reviewed and confirmed the above information with patient and updated as necessary. Review of Systems     Review of Systems   Constitutional: Negative for diaphoresis and fever. HENT: Positive for congestion. Negative for ear pain and sore throat. Eyes:        No acute change in vision   Respiratory: Positive for cough and shortness of breath. Cardiovascular: Negative for chest pain and leg swelling. Gastrointestinal: Negative for abdominal pain and vomiting. Genitourinary: Negative for dysuria. Musculoskeletal: Negative for neck pain. Skin: Negative for wound. Neurological: Positive for dizziness and headaches. Negative for weakness and numbness. Physical Exam     Visit Vitals  BP (!) 171/91   Pulse 77   Temp 98.3 °F (36.8 °C)   Resp 17   Ht 5' 6\" (1.676 m)   Wt 98 kg (216 lb)   LMP 10/30/2018   SpO2 99%   BMI 34.86 kg/m²       Physical Exam  Vitals and nursing note reviewed. Constitutional:       Comments: Well-appearing adult female independently ambulatory with a strong gait. Not distressed. HENT:      Mouth/Throat:      Mouth: Mucous membranes are moist.   Eyes:      Pupils: Pupils are equal, round, and reactive to light. Cardiovascular:      Rate and Rhythm: Normal rate and regular rhythm. Pulses: Normal pulses. Pulmonary:      Effort: Pulmonary effort is normal.      Breath sounds: Normal breath sounds. Abdominal:      Palpations: Abdomen is soft. Tenderness: There is no abdominal tenderness. Musculoskeletal:         General: No signs of injury. Normal range of motion. Cervical back: Normal range of motion. Skin:     General: Skin is warm. Neurological:      General: No focal deficit present. Mental Status: She is alert and oriented to person, place, and time. Mental status is at baseline. Cranial Nerves: No cranial nerve deficit. Sensory: No sensory deficit. Motor: No weakness.       Coordination: Coordination normal.         Diagnostic Study Results     Labs -  Recent Results (from the past 24 hour(s))   CBC WITH AUTOMATED DIFF    Collection Time: 01/23/22 10:53 AM   Result Value Ref Range    WBC 4.1 (L) 4.6 - 13.2 K/uL    RBC 5.01 4.20 - 5.30 M/uL    HGB 12.3 12.0 - 16.0 g/dL    HCT 39.5 35.0 - 45.0 %    MCV 78.8 78.0 - 100.0 FL    MCH 24.6 24.0 - 34.0 PG    MCHC 31.1 31.0 - 37.0 g/dL    RDW 15.9 (H) 11.6 - 14.5 %    PLATELET 715 813 - 837 K/uL    MPV 10.7 9.2 - 11.8 FL    NRBC 0.0 0  WBC    ABSOLUTE NRBC 0.00 0.00 - 0.01 K/uL    NEUTROPHILS 50 40 - 73 %    LYMPHOCYTES 42 21 - 52 %    MONOCYTES 8 3 - 10 %    EOSINOPHILS 0 0 - 5 %    BASOPHILS 0 0 - 2 %    IMMATURE GRANULOCYTES 0 0.0 - 0.5 %    ABS. NEUTROPHILS 2.0 1.8 - 8.0 K/UL    ABS. LYMPHOCYTES 1.7 0.9 - 3.6 K/UL    ABS. MONOCYTES 0.3 0.05 - 1.2 K/UL    ABS. EOSINOPHILS 0.0 0.0 - 0.4 K/UL    ABS. BASOPHILS 0.0 0.0 - 0.1 K/UL    ABS. IMM. GRANS. 0.0 0.00 - 0.04 K/UL    DF AUTOMATED     METABOLIC PANEL, COMPREHENSIVE    Collection Time: 01/23/22 10:53 AM   Result Value Ref Range    Sodium 135 (L) 136 - 145 mmol/L    Potassium 3.8 3.5 - 5.5 mmol/L    Chloride 102 100 - 111 mmol/L    CO2 27 21 - 32 mmol/L    Anion gap 6 3.0 - 18 mmol/L    Glucose 245 (H) 74 - 99 mg/dL    BUN 14 7.0 - 18 MG/DL    Creatinine 1.13 0.6 - 1.3 MG/DL    BUN/Creatinine ratio 12 12 - 20      GFR est AA >60 >60 ml/min/1.73m2    GFR est non-AA 51 (L) >60 ml/min/1.73m2    Calcium 9.4 8.5 - 10.1 MG/DL    Bilirubin, total 0.3 0.2 - 1.0 MG/DL    ALT (SGPT) 79 (H) 13 - 56 U/L    AST (SGOT) 32 10 - 38 U/L    Alk.  phosphatase 128 (H) 45 - 117 U/L    Protein, total 8.4 (H) 6.4 - 8.2 g/dL    Albumin 3.8 3.4 - 5.0 g/dL    Globulin 4.6 (H) 2.0 - 4.0 g/dL    A-G Ratio 0.8 0.8 - 1.7     EKG, 12 LEAD, INITIAL    Collection Time: 01/23/22 10:58 AM   Result Value Ref Range    Ventricular Rate 74 BPM    Atrial Rate 74 BPM    P-R Interval 176 ms    QRS Duration 92 ms    Q-T Interval 376 ms    QTC Calculation (Bezet) 417 ms    Calculated P Axis 53 degrees    Calculated R Axis 7 degrees    Calculated T Axis 113 degrees    Diagnosis       Normal sinus rhythm  T wave abnormality, consider lateral ischemia  Abnormal ECG  When compared with ECG of 24-OCT-2018 14:31,  fusion complexes are no longer present  premature ventricular complexes are no longer present  premature supraventricular complexes are no longer present  Inverted T waves have replaced nonspecific T wave abnormality in Lateral   leads     SARS-COV-2    Collection Time: 01/23/22 11:00 AM   Result Value Ref Range    SARS-CoV-2 Please find results under separate order           Radiologic Studies -   XR CHEST PORT   Final Result      Low lung volumes. Hazy lung opacity bilaterally concerning for subtle   groundglass infiltrates such as can be seen with Covid, clinical correlation is   needed. Medical Decision Making   I am the first provider for this patient. I reviewed the vital signs, available nursing notes, past medical history, past surgical history, family history and social history. Vital Signs-Reviewed the patient's vital signs. EKG: Nondiagnostic for ischemia    Records Reviewed: Nursing Notes and Old Medical Records (Time of Review: 11:00 AM)    Provider Notes (Medical Decision Making):   MDM  18-year-old female here with cough, congestion, sinus headache, likely consistent with COVID-19, consider pneumonia, but other viral illness, doubt ACS, PE.   No evidence of asthma exacerbation    ED Course: Progress Notes, Reevaluation, and Consults:  Patient arrives afebrile, hypertensive to 207/94 but otherwise hemodynamically normal satting 100% on room air  Patient states that she has whitecoat hypertension, will reevaluate after she settles in    Overall exam is well appearing reassuring, neuro exam is unremarkable, cranial nerves, strength and sensation are all intact, no vertigo-like symptoms to be concerned for posterior CVA or other intracranial process    We will screen labs, COVID, chest x-ray. Screening labs unremarkable  Chest x-ray consistent with COVID-19    Patient is not hypoxic, vital signs of improved, blood pressure now 171/91. Feel she is appropriate for discharge home. We discussed at length signs and symptoms that would prompt return to the emergency department. She like this plan and feels comfortable being discharged. We will send with Flonase, Tessalon Perles, azithromycin. She will follow-up with her PCP discharged home in stable condition           Procedures      Diagnosis     Clinical Impression:   1. Suspected COVID-19 virus infection    2. Cough    3. Nasal congestion    4. Seasonal allergic rhinitis due to pollen        Disposition Home    Follow-up Information     Follow up With Specialties Details Why Contact Info    [facility] EMERGENCY DEPT Emergency Medicine  As needed, If symptoms worsen [address] [facility]  [phone]    Dorian Myles MD Family Medicine Schedule an appointment as soon as possible for a visit   [address] [place] [id number] [phone]             Discharge Medication List as of 1/23/2022  1:15 PM      START taking these medications    Details   !! fluticasone propionate (FLONASE) 50 mcg/actuation nasal spray 2 Sprays by Both Nostrils route daily. , Normal, Disp-16 g, R-0      benzonatate (Tessalon Perles) 100 mg capsule Take 1 Capsule by mouth three (3) times daily as needed for Cough for up to 7 days. , Normal, Disp-30 Capsule, R-0      azithromycin (ZITHROMAX) 250 mg tablet Take as prescribed, Normal, Disp-6 Tablet, R-0       !! - Potential duplicate medications found. Please discuss with provider. CONTINUE these medications which have NOT CHANGED    Details   cholecalciferol (Vitamin D3) 25 mcg (1,000 unit) cap Take 1,000 Units by mouth daily. , Historical Med      naproxen (NAPROSYN) 500 mg tablet TAKE 1 TABLET BY MOUTH TWICE DAILY WITH FOOD, Normal, Disp-60 Tablet, R-5      aliskiren (TEKTURNA) 300 mg tablet Take 1 Tablet by mouth daily. , Normal, Disp-30 Tablet, R-4      triamcinolone acetonide (KENALOG) 0.1 % topical cream APPLY A THIN LAYER TO AFFECTED AREA TWICE DAILY, Normal, Disp-80 g, R-3      glipiZIDE (GLUCOTROL) 5 mg tablet TAKE 1 TABLET BY MOUTH TWICE A DAY, Normal, Disp-60 Tablet, R-5      metFORMIN (GLUCOPHAGE) 1,000 mg tablet TAKE 1 TABLET BY MOUTH TWICE A DAY WITH MEALS, Normal, Disp-60 Tablet, R-5      hydroCHLOROthiazide (HYDRODIURIL) 25 mg tablet TAKE 1 TABLET BY MOUTH EVERY DAY, Normal, Disp-30 Tablet, R-5      Januvia 100 mg tablet TAKE 1 TABLET BY MOUTH EVERY DAY, Normal, Disp-30 Tablet, R-5      meclizine (ANTIVERT) 12.5 mg tablet Take 1 Tablet by mouth three (3) times daily as needed for Dizziness., Normal, Disp-30 Tablet, R-1      Lastacaft 0.25 % drop INSTILL 1 DROP INTO EACH EYE ONCE DAILY, Historical Med, SAM      ondansetron (ZOFRAN ODT) 8 mg disintegrating tablet Take 1 Tablet by mouth every eight (8) hours as needed for Nausea., Normal, Disp-12 Tablet, R-3      !! fluticasone propionate (FLONASE) 50 mcg/actuation nasal spray Use 2 spray(s) in each nostril once daily, Normal, Disp-16 g, R-12      albuterol (Ventolin HFA) 90 mcg/actuation inhaler INHALE TWO PUFFS BY MOUTH EVERY 4 HOURS AS NEEDED FOR WHEEZING, Normal, Disp-1 Inhaler,R-5      cyanocobalamin 1,000 mcg tablet Take 1,000 mcg by mouth daily. , Historical Med      Blood-Glucose Meter monitoring kit Test blood sugar once a day .00 One Touch Ultra Mini Glucose Meter, Print, Disp-1 Kit, R-0      glucose blood VI test strips (ASCENSIA AUTODISC VI, ONE TOUCH ULTRA TEST VI) strip Test blood sugar once daily .00 One Touch Ultra Mini Test strip, Print, Disp-1 Package, R-11      Lancets misc Use with One Touch Ultra Mini Glucose Meter test blood sugar once daily .00, Print, Disp-1 Package, R-11      MULTI-VITAMIN PO Take  by mouth daily. , Historical Med ascorbic acid (VITAMIN C) 500 mg tablet Take 500 mg by mouth daily. , Historical Med       !! - Potential duplicate medications found. Please discuss with provider. Berkeley Brunner, MD   Emergency Medicine   January 23, 2022, 11:00 AM     This note is dictated utilizing Dragon voice recognition software. Unfortunately this leads to occasional typographical errors using the voice recognition. I apologize in advance if the situation occurs. If questions occur please do not hesitate to contact me directly.     Cary Tovar MD

## 2022-01-23 NOTE — Clinical Note
[address] [facility] EMERGENCY DEPT  [id number] [address]  [phone]    Work/School Note    Date: 1/23/2022     To Whom It May concern:    Russell Sandoval was evaluated by the following provider(s):  Attending Provider: Racheal Marte MD.   COVID19 virus is suspected. Per the CDC guidelines we recommend home isolation until the following conditions are all met:    1. At least five days have passed since symptoms first appeared and/or had a close exposure,   2. After home isolation for five days, wearing a mask around others for the next five days,  3. At least 24 have passed since last fever without the use of fever-reducing medications and  4.  Symptoms (eg cough, shortness of breath) have improved      Sincerely,          Parmjit Silverman MD

## 2022-01-23 NOTE — ED NOTES
Labs have been drawn along with Covid Swab obtained. EKG printed and given to provider. Pt updated on poc. Denies any needs. Call bell in reach.

## 2022-01-24 ENCOUNTER — PATIENT OUTREACH (OUTPATIENT)
Dept: CASE MANAGEMENT | Age: 52
End: 2022-01-24

## 2022-01-24 LAB
ATRIAL RATE: 74 BPM
CALCULATED P AXIS, ECG09: 53 DEGREES
CALCULATED R AXIS, ECG10: 7 DEGREES
CALCULATED T AXIS, ECG11: 113 DEGREES
DIAGNOSIS, 93000: NORMAL
P-R INTERVAL, ECG05: 176 MS
Q-T INTERVAL, ECG07: 376 MS
QRS DURATION, ECG06: 92 MS
QTC CALCULATION (BEZET), ECG08: 417 MS
SARS-COV-2, NAA: DETECTED
VENTRICULAR RATE, ECG03: 74 BPM

## 2022-01-24 NOTE — PROGRESS NOTES
Patient contacted regarding COVID-19 diagnosis. Discussed COVID-19 related testing which was available at this time. Test results were positive. Patient informed of results, if available? yes. Ambulatory Care Manager contacted the patient by telephone to perform post discharge assessment. Call within 2 business days of discharge: Yes Verified name and  with patient as identifiers. Provided introduction to self, and explanation of the CTN/ACM role, and reason for call due to risk factors for infection and/or exposure to COVID-19. Symptoms reviewed with patient who verbalized the following symptoms: fatigue and cough      Due to no new or worsening symptoms encounter was not routed to provider for escalation. Discussed follow-up appointments. If no appointment was previously scheduled, appointment scheduling offered:  no. [facility] follow up appointment(s):   Future Appointments   Date Time Provider Jacqui Roberts   2022 11:45 AM Yesi Titus MD [facility]     [facility] follow up appointment(s): none at this time     Advance Care Planning:   Does patient have an Advance Directive: not on file. Educated patient about risk for severe COVID-19 due to risk factors according to CDC guidelines. ACM reviewed discharge instructions, medical action plan and red flag symptoms with the patient who verbalized understanding. Discussed COVID vaccination status: no. Patient is not vaccinated per notes. Discussed exposure protocols and quarantine with CDC Guidelines. Patient was given an opportunity to verbalize any questions and concerns and agrees to contact ACM or health care provider for questions related to their healthcare. Reviewed and educated patient on any new and changed medications related to discharge diagnosis     Was patient discharged with a pulse oximeter? no      ACM provided contact information. Plan for follow-up call in 5-7 days based on severity of symptoms and risk factors.

## 2022-01-25 ENCOUNTER — VIRTUAL VISIT (OUTPATIENT)
Dept: FAMILY MEDICINE CLINIC | Age: 52
End: 2022-01-25
Payer: COMMERCIAL

## 2022-01-25 DIAGNOSIS — E11.65 TYPE 2 DIABETES MELLITUS WITH HYPERGLYCEMIA, WITHOUT LONG-TERM CURRENT USE OF INSULIN (HCC): Primary | ICD-10-CM

## 2022-01-25 DIAGNOSIS — U07.1 COVID-19: ICD-10-CM

## 2022-01-25 DIAGNOSIS — E78.5 HYPERLIPIDEMIA ASSOCIATED WITH TYPE 2 DIABETES MELLITUS (HCC): ICD-10-CM

## 2022-01-25 DIAGNOSIS — J30.1 SEASONAL ALLERGIC RHINITIS DUE TO POLLEN: ICD-10-CM

## 2022-01-25 DIAGNOSIS — I10 ESSENTIAL HYPERTENSION: ICD-10-CM

## 2022-01-25 DIAGNOSIS — E11.69 HYPERLIPIDEMIA ASSOCIATED WITH TYPE 2 DIABETES MELLITUS (HCC): ICD-10-CM

## 2022-01-25 PROCEDURE — 99214 OFFICE O/P EST MOD 30 MIN: CPT | Performed by: FAMILY MEDICINE

## 2022-01-25 RX ORDER — FLUTICASONE PROPIONATE 50 MCG
SPRAY, SUSPENSION (ML) NASAL
Qty: 16 G | Refills: 12 | Status: SHIPPED | OUTPATIENT
Start: 2022-01-25

## 2022-01-25 NOTE — PROGRESS NOTES
Farhat Villa is a 45 y.o. female who was seen by synchronous (real-time) audio-video technology on 1/25/2022 for Diabetes, Hypertension, and Cholesterol Problem        Assessment & Plan:   Diagnoses and all orders for this visit:    1. Type 2 diabetes mellitus with hyperglycemia, without long-term current use of insulin (HCC)  -     METABOLIC PANEL, COMPREHENSIVE; Future  -     LIPID PANEL; Future  -     HEMOGLOBIN A1C WITH EAG; Future  -     MICROALBUMIN, UR, RAND W/ MICROALB/CREAT RATIO; Future  Not at goal.  Pt to work on diet and exercise. Pt declines med adjustments at this time. 2. Seasonal allergic rhinitis due to pollen  -     fluticasone propionate (FLONASE) 50 mcg/actuation nasal spray; Use 2 spray(s) in each nostril once daily    3. Hyperlipidemia associated with type 2 diabetes mellitus (HCC)  -     METABOLIC PANEL, COMPREHENSIVE; Future  -     LIPID PANEL; Future  Work on diet. Increase exercise. 4. Essential hypertension  -     METABOLIC PANEL, COMPREHENSIVE; Future  -     LIPID PANEL; Future    5. COVID-19  Pt reports that she is improving. The complexity of medical decision making for this visit is moderate   Follow-up and Dispositions    · Return in about 3 months (around 4/25/2022) for diabetes, high blood pressure, high cholesterol, lab review. 712  Subjective:   Patient contacted via doxy. me. Pt reports that she went to the ER due to vertigo. She did develop sinus pain and cough. She had a positive covid test.   She is still in her 5 days of isolation. She has had a zpack and feels this helped. Pt had labs on 1/20/22 for f/u of dm, htn, hld. Home bs readings are not usually high; she has seen some readings over 200. Pt admits that she may have had some dietary indiscretion over the holidays that elevated her sugars. She had been working on getting her cholesterol lower. Pt would not like to change meds at this time.   She has started Weight [employer] and feels her sugars will be lower. Prior to Admission medications    Medication Sig Start Date End Date Taking? Authorizing Provider   fluticasone propionate (FLONASE) 50 mcg/actuation nasal spray Use 2 spray(s) in each nostril once daily 1/25/22  Yes Marylene Blender, MD   cholecalciferol (Vitamin D3) 25 mcg (1,000 unit) cap Take 1,000 Units by mouth daily. Yes Other, MD [place]   benzonatate (Tessalon Perles) 100 mg capsule Take 1 Capsule by mouth three (3) times daily as needed for Cough for up to 7 days. 1/23/22 1/30/22 Yes Jayashree Jimenez MD   azithromycin (ZITHROMAX) 250 mg tablet Take as prescribed 1/23/22 1/28/22 Yes Jayashree Jimenez MD   naproxen (NAPROSYN) 500 mg tablet TAKE 1 TABLET BY MOUTH TWICE DAILY WITH FOOD 10/29/21  Yes Marylene Blender, MD   aliskiren (TEKTURNA) 300 mg tablet Take 1 Tablet by mouth daily. 10/11/21  Yes Felton Boggs MD   triamcinolone acetonide (KENALOG) 0.1 % topical cream APPLY A THIN LAYER TO AFFECTED AREA TWICE DAILY 10/5/21  Yes Gill Boggs MD   glipiZIDE (GLUCOTROL) 5 mg tablet TAKE 1 TABLET BY MOUTH TWICE A DAY 8/27/21  Yes Gill Boggs MD   metFORMIN (GLUCOPHAGE) 1,000 mg tablet TAKE 1 TABLET BY MOUTH TWICE A DAY WITH MEALS 8/27/21  Yes Gill Boggs MD   hydroCHLOROthiazide (HYDRODIURIL) 25 mg tablet TAKE 1 TABLET BY MOUTH EVERY DAY 8/27/21  Yes Felton Boggs MD   Januvia 100 mg tablet TAKE 1 TABLET BY MOUTH EVERY DAY 8/27/21  Yes Marylene Blender, MD   meclizine (ANTIVERT) 12.5 mg tablet Take 1 Tablet by mouth three (3) times daily as needed for Dizziness. 8/4/21  Yes Felton Boggs MD   Lastacaft 0.25 % drop INSTILL 1 DROP INTO EACH EYE ONCE DAILY 6/24/21  Yes Provider, Historical   ondansetron (ZOFRAN ODT) 8 mg disintegrating tablet Take 1 Tablet by mouth every eight (8) hours as needed for Nausea.  7/29/21  Yes Felton Boggs MD   albuterol (Ventolin HFA) 90 mcg/actuation inhaler INHALE TWO PUFFS BY MOUTH EVERY 4 HOURS AS NEEDED FOR WHEEZING 10/6/20  Yes Imelda Zhang MD   cyanocobalamin 1,000 mcg tablet Take 1,000 mcg by mouth daily. Yes Provider, Historical   Blood-Glucose Meter monitoring kit Test blood sugar once a day .00 One Touch Ultra Mini Glucose Meter 5/6/14  Yes Gill Boggs MD   glucose blood VI test strips (ASCENSIA AUTODISC VI, ONE TOUCH ULTRA TEST VI) strip Test blood sugar once daily .00 One Touch Ultra Mini Test strip 5/6/14  Yes Imelda Zhang MD   Lancets misc Use with One Touch Ultra Mini Glucose Meter test blood sugar once daily .00 5/6/14  Yes Boby Boggs MD   MULTI-VITAMIN PO Take  by mouth daily. 6/7/11  Yes Provider, Historical   ascorbic acid (VITAMIN C) 500 mg tablet Take 500 mg by mouth daily. 6/7/11  Yes Provider, Historical     Patient Active Problem List   Diagnosis Code    Eczema L30.9    AR (allergic rhinitis) J30.9    Essential hypertension I10    Mixed hyperlipidemia E78.2    Anemia, unspecified D64.9    Type 2 diabetes mellitus without complication, without long-term current use of insulin ([facility] [address] E11.9    Allergic rhinitis J30.9    Essential hypertension with goal blood pressure less than 130/80 I10    Type 2 diabetes with nephropathy (HCC) E11.21    Severe obesity (BMI 35.0-39. 9) E66.01    DUB (dysfunctional uterine bleeding) N93.8    Leiomyoma of uterus D25.9    White coat syndrome with hypertension I10    Eustachian tube dysfunction, bilateral H69.83    Vertigo R42    Benign paroxysmal positional vertigo due to bilateral vestibular disorder H81.13    Finger mass, left R22.32    Hyperlipidemia associated with type 2 diabetes mellitus (HCC) E11.69, E78.5    COVID-19 U07.1     Current Outpatient Medications   Medication Sig Dispense Refill    fluticasone propionate (FLONASE) 50 mcg/actuation nasal spray Use 2 spray(s) in each nostril once daily 16 g 12    cholecalciferol (Vitamin D3) 25 mcg (1,000 unit) cap Take 1,000 Units by mouth daily.       benzonatate (Tessalon Perles) 100 mg capsule Take 1 Capsule by mouth three (3) times daily as needed for Cough for up to 7 days. 30 Capsule 0    azithromycin (ZITHROMAX) 250 mg tablet Take as prescribed 6 Tablet 0    naproxen (NAPROSYN) 500 mg tablet TAKE 1 TABLET BY MOUTH TWICE DAILY WITH FOOD 60 Tablet 5    aliskiren (TEKTURNA) 300 mg tablet Take 1 Tablet by mouth daily. 30 Tablet 4    triamcinolone acetonide (KENALOG) 0.1 % topical cream APPLY A THIN LAYER TO AFFECTED AREA TWICE DAILY 80 g 3    glipiZIDE (GLUCOTROL) 5 mg tablet TAKE 1 TABLET BY MOUTH TWICE A DAY 60 Tablet 5    metFORMIN (GLUCOPHAGE) 1,000 mg tablet TAKE 1 TABLET BY MOUTH TWICE A DAY WITH MEALS 60 Tablet 5    hydroCHLOROthiazide (HYDRODIURIL) 25 mg tablet TAKE 1 TABLET BY MOUTH EVERY DAY 30 Tablet 5    Januvia 100 mg tablet TAKE 1 TABLET BY MOUTH EVERY DAY 30 Tablet 5    meclizine (ANTIVERT) 12.5 mg tablet Take 1 Tablet by mouth three (3) times daily as needed for Dizziness. 30 Tablet 1    Lastacaft 0.25 % drop INSTILL 1 DROP INTO EACH EYE ONCE DAILY      ondansetron (ZOFRAN ODT) 8 mg disintegrating tablet Take 1 Tablet by mouth every eight (8) hours as needed for Nausea. 12 Tablet 3    albuterol (Ventolin HFA) 90 mcg/actuation inhaler INHALE TWO PUFFS BY MOUTH EVERY 4 HOURS AS NEEDED FOR WHEEZING 1 Inhaler 5    cyanocobalamin 1,000 mcg tablet Take 1,000 mcg by mouth daily.  Blood-Glucose Meter monitoring kit Test blood sugar once a day .00 One Touch Ultra Mini Glucose Meter 1 Kit 0    glucose blood VI test strips (ASCENSIA AUTODISC VI, ONE TOUCH ULTRA TEST VI) strip Test blood sugar once daily .00 One Touch Ultra Mini Test strip 1 Package 11    Lancets misc Use with One Touch Ultra Mini Glucose Meter test blood sugar once daily .00 1 Package 11    MULTI-VITAMIN PO Take  by mouth daily.  ascorbic acid (VITAMIN C) 500 mg tablet Take 500 mg by mouth daily.        Allergies   Allergen Reactions    Codeine Nausea Only and Other (comments)     Sweats felt like she was going to pass out    Procardia [Nifedipine] Shortness of Breath, Nausea and Vomiting and Other (comments)     Tightness in chest on 1990    Sulfa (Sulfonamide Antibiotics) Rash    Tylox [Oxycodone-Acetaminophen] Nausea Only and Other (comments)     Sweats, felt like she was going to pass out   [employer] With Codeine  [Acetaminophen-Codeine] Unknown (comments)     Past Medical History:   Diagnosis Date    Anemia NEC ///    during pregnancy    AR (allergic rhinitis) 2011    COVID-19 2022    Eczema     since childhood    Gestational diabetes 1993    x1 pregnancy    Hypertension     Type 2 diabetes mellitus without complication, without long-term current use of insulin ([facility] [address] 2016    Vertigo      Past Surgical History:   Procedure Laterality Date    DELIVERY   [id number]    x4    HX TOTAL ABDOMINAL HYSTERECTOMY  10/2018    abd hyst with BSO; cervix was left due to scar tissue    CO ABDOMEN SURGERY PROC UNLISTED      Explore lap at 10 y/o    CO LAP,CHOLECYSTECTOMY/EXPLORE  1986    CO REMOVAL GALLBLADDER  2002     Family History   Problem Relation Age of Onset    Hypertension Mother     Cancer Mother 61        breast    Other Mother         diverticulosis    Allergic Rhinitis Mother     Breast Cancer Mother     Cancer Father 32        glands, neck    Cancer Maternal Grandmother         breast-brain    Breast Cancer Maternal Grandmother     Diabetes Maternal Grandfather     Hypertension Maternal Grandfather     Other Paternal Grandmother         narcolepsy    Eczema Daughter     Asthma Daughter     Allergic Rhinitis Daughter     Eczema Daughter     Allergic Rhinitis Daughter     Allergic Rhinitis Daughter     Allergic Rhinitis Daughter      Social History     Tobacco Use    Smoking status: Never Smoker    Smokeless tobacco: Never Used   Substance Use Topics    Alcohol use: Not Currently Comment: rare       Review of Systems   Constitutional: Negative. HENT: Negative. Respiratory: Negative. Cardiovascular: Negative. All other systems reviewed and are negative. Objective:     Patient-Reported Vitals 1/25/2022   Patient-Reported Weight -   Patient-Reported Pulse 77   Patient-Reported Systolic  906   Patient-Reported Diastolic 86      General: alert, cooperative, no distress   Mental  status: normal mood, behavior, speech, dress, motor activity, and thought processes, able to follow commands   HENT: NCAT   Neck: no visualized mass   Resp: no respiratory distress   Neuro: no gross deficits   Skin: no discoloration or lesions of concern on visible areas   Psychiatric: normal affect, consistent with stated mood, no evidence of hallucinations     Additional exam findings: none      We discussed the expected course, resolution and complications of the diagnosis(es) in detail. Medication risks, benefits, costs, interactions, and alternatives were discussed as indicated. I advised her to contact the office if her condition worsens, changes or fails to improve as anticipated. She expressed understanding with the diagnosis(es) and plan. Juan Krishnamurthy, was evaluated through a synchronous (real-time) audio-video encounter. The patient (or guardian if applicable) is aware that this is a billable service, which includes applicable co-pays. Verbal consent to proceed has been obtained. The visit was conducted pursuant to the emergency declaration under the [address] [place] [address] authority and the [employer] General Act. Patient identification was verified, and a caregiver was present when appropriate. The patient was located at home in a state where the provider was licensed to provide care.     Raul Banks MD

## 2022-01-25 NOTE — PROGRESS NOTES
Brionna Hampton presents today for   Chief Complaint   Patient presents with    Diabetes    Hypertension    Cholesterol Problem       Brionna Hampton preferred language for health care discussion is english/other. Is someone accompanying this pt? No         Depression Screening:  3 most recent PHQ Screens 10/29/2021   PHQ Not Done -   Little interest or pleasure in doing things Not at all   Feeling down, depressed, irritable, or hopeless Not at all   Total Score PHQ 2 0   Trouble falling or staying asleep, or sleeping too much -   Feeling tired or having little energy -   Poor appetite, weight loss, or overeating -   Feeling bad about yourself - or that you are a failure or have let yourself or your family down -   Trouble concentrating on things such as school, work, reading, or watching TV -   Moving or speaking so slowly that other people could have noticed; or the opposite being so fidgety that others notice -   Thoughts of being better off dead, or hurting yourself in some way -   PHQ 9 Score -   How difficult have these problems made it for you to do your work, take care of your home and get along with others -       Learning Assessment:  Learning Assessment 4/21/2021   PRIMARY LEARNER Patient   HIGHEST LEVEL OF EDUCATION - PRIMARY LEARNER  > 4 YEARS OF COLLEGE   BARRIERS PRIMARY LEARNER NONE   CO-LEARNER CAREGIVER No   PRIMARY LANGUAGE ENGLISH   LEARNER PREFERENCE PRIMARY LISTENING   ANSWERED BY Patient   RELATIONSHIP SELF       Abuse Screening:  Abuse Screening Questionnaire 4/21/2021   Do you ever feel afraid of your partner? N   Are you in a relationship with someone who physically or mentally threatens you? N   Is it safe for you to go home? Y       Generalized Anxiety  No flowsheet data found. Health Maintenance Due   Topic Date Due    Hepatitis C Screening  Never done    Foot Exam Q1  Never done    Shingrix Vaccine Age 50> (1 of 2) Never done    Flu Vaccine (1) 09/01/2021   . Health Maintenance reviewed and discussed and ordered per Provider. VACCINES DUE   SCREENINGS DUE       Hunter Edmond is updated on all HM    1. \"Have you been to the ER, urgent care clinic since your last visit? Hospitalized since your last visit? \" Yes When: 1/23/22  Where: [facility] Reason for visit: COVID sx     2. \"Have you seen or consulted any other health care providers outside of the [address] [place] [address] since your last visit? \" No     3. For patients aged 39-70: Has the patient had a colonoscopy / FIT/ Cologuard? Yes - no Care Gap present     If the patient is female:    4. For patients aged 41-77: Has the patient had a mammogram within the past 2 years? Yes - no Care Gap present    5. For patients aged 21-65: Has the patient had a pap smear?  Yes - no Care Gap present

## 2022-02-18 ENCOUNTER — TRANSCRIBE ORDER (OUTPATIENT)
Dept: SCHEDULING | Age: 52
End: 2022-02-18

## 2022-02-18 DIAGNOSIS — Z12.31 VISIT FOR SCREENING MAMMOGRAM: Primary | ICD-10-CM

## 2022-02-24 DIAGNOSIS — I10 ESSENTIAL HYPERTENSION: ICD-10-CM

## 2022-02-24 DIAGNOSIS — E11.9 TYPE 2 DIABETES MELLITUS WITHOUT COMPLICATION, WITHOUT LONG-TERM CURRENT USE OF INSULIN (HCC): ICD-10-CM

## 2022-02-25 RX ORDER — SITAGLIPTIN 100 MG/1
TABLET, FILM COATED ORAL
Qty: 30 TABLET | Refills: 5 | Status: SHIPPED | OUTPATIENT
Start: 2022-02-25 | End: 2022-09-09 | Stop reason: SDUPTHER

## 2022-02-25 RX ORDER — METFORMIN HYDROCHLORIDE 1000 MG/1
TABLET ORAL
Qty: 60 TABLET | Refills: 5 | Status: SHIPPED | OUTPATIENT
Start: 2022-02-25 | End: 2022-09-09 | Stop reason: SDUPTHER

## 2022-02-25 RX ORDER — HYDROCHLOROTHIAZIDE 25 MG/1
TABLET ORAL
Qty: 30 TABLET | Refills: 5 | Status: SHIPPED | OUTPATIENT
Start: 2022-02-25 | End: 2022-09-09 | Stop reason: SDUPTHER

## 2022-02-25 RX ORDER — GLIPIZIDE 5 MG/1
TABLET ORAL
Qty: 60 TABLET | Refills: 5 | Status: SHIPPED | OUTPATIENT
Start: 2022-02-25 | End: 2022-09-09 | Stop reason: SDUPTHER

## 2022-03-14 NOTE — PROGRESS NOTES
3/14/2022    Patient: Soledad Franklin   YOB: 1943   Date of Visit: 3/14/2022     Bart Damon MD  Via In Basket    Dear Bart Damon MD,      Thank you for referring Ms. Leonid Steel to [facility] for evaluation. My notes for this consultation are attached. If you have questions, please do not hesitate to call me. I look forward to following your patient along with you.       Sincerely,    Alberto Sewell, DO Chief Complaint   Patient presents with    Diabetes     follow up    Hypertension         HPI    Prince Landa is a 51 y.o. female presenting today for  4 months  follow up of dm, htn. Home bs readings are as low as 120; postprandial readings are as high as 260. Home bp readings are usually approx 246 systolic, 49E diastolic. Pt has been careful with her diet. Patient had labs on 5/15/19. Labs reviewed in detail with patient     Pt will see her gyn for a pap next week. Patient does not need medication refills today. New concerns today: none    Pt had her flu shot today at work. ROS  Review of Systems   Constitutional: Negative. HENT: Negative. Respiratory: Negative. Cardiovascular: Negative. All other systems reviewed and are negative. Physical Exam  Physical Exam   Nursing note and vitals reviewed. Constitutional: She is oriented to person, place, and time. She appears well-developed and well-nourished. HENT:   Head: Normocephalic and atraumatic. Right Ear: External ear normal.   Left Ear: External ear normal.   Nose: Nose normal.   Eyes: Conjunctivae and EOM are normal.   Neck: Normal range of motion. Neck supple. No JVD present. Carotid bruit is not present. No thyromegaly present. Cardiovascular: Normal rate, regular rhythm, normal heart sounds and intact distal pulses. Exam reveals no gallop and no friction rub. No murmur heard. Pulmonary/Chest: Effort normal and breath sounds normal. She has no wheezes. She has no rhonchi. She has no rales. Abdominal: Soft. Bowel sounds are normal.   Musculoskeletal: Normal range of motion. Neurological: She is alert and oriented to person, place, and time. Coordination normal.   Skin: Skin is warm and dry. Psychiatric: She has a normal mood and affect. Her behavior is normal. Judgment and thought content normal.     Diagnoses and all orders for this visit:    1.  Type 2 diabetes mellitus without complication, without long-term current use of insulin (HCC)  -     METABOLIC PANEL, COMPREHENSIVE; Future  -     LIPID PANEL; Future  -     HEMOGLOBIN A1C WITH EAG; Future  -     MICROALBUMIN, UR, RAND W/ MICROALB/CREAT RATIO; Future    2. Mixed hyperlipidemia  -     METABOLIC PANEL, COMPREHENSIVE; Future  -     LIPID PANEL; Future    3. Essential hypertension  -     METABOLIC PANEL, COMPREHENSIVE; Future  -     LIPID PANEL; Future    4. White coat syndrome with hypertension  -     METABOLIC PANEL, COMPREHENSIVE; Future  -     LIPID PANEL; Future      Follow-up and Dispositions    · Return in about 4 months (around 2/16/2020) for diabetes, high blood pressure, high cholesterol.

## 2022-03-17 ENCOUNTER — HOSPITAL ENCOUNTER (OUTPATIENT)
Dept: MAMMOGRAPHY | Age: 52
Discharge: HOME | End: 2022-03-17
Attending: FAMILY MEDICINE
Payer: COMMERCIAL

## 2022-03-17 DIAGNOSIS — Z12.31 VISIT FOR SCREENING MAMMOGRAM: ICD-10-CM

## 2022-03-17 PROCEDURE — 77067 SCR MAMMO BI INCL CAD: CPT

## 2022-03-18 PROBLEM — E11.21 TYPE 2 DIABETES WITH NEPHROPATHY (HCC): Status: ACTIVE | Noted: 2018-07-05

## 2022-03-18 PROBLEM — H81.13 BENIGN PAROXYSMAL POSITIONAL VERTIGO DUE TO BILATERAL VESTIBULAR DISORDER: Status: ACTIVE | Noted: 2020-09-08

## 2022-03-18 PROBLEM — U07.1 COVID-19: Status: ACTIVE | Noted: 2022-01-25

## 2022-03-19 PROBLEM — H69.83 EUSTACHIAN TUBE DYSFUNCTION, BILATERAL: Status: ACTIVE | Noted: 2020-05-27

## 2022-03-19 PROBLEM — R22.32 FINGER MASS, LEFT: Status: ACTIVE | Noted: 2020-09-08

## 2022-03-19 PROBLEM — R42 VERTIGO: Status: ACTIVE | Noted: 2020-05-27

## 2022-03-19 PROBLEM — H69.93 EUSTACHIAN TUBE DYSFUNCTION, BILATERAL: Status: ACTIVE | Noted: 2020-05-27

## 2022-03-19 PROBLEM — E11.69 HYPERLIPIDEMIA ASSOCIATED WITH TYPE 2 DIABETES MELLITUS (HCC): Status: ACTIVE | Noted: 2022-01-25

## 2022-03-19 PROBLEM — D25.9 LEIOMYOMA OF UTERUS: Status: ACTIVE | Noted: 2018-10-30

## 2022-03-19 PROBLEM — E78.5 HYPERLIPIDEMIA ASSOCIATED WITH TYPE 2 DIABETES MELLITUS (HCC): Status: ACTIVE | Noted: 2022-01-25

## 2022-03-19 PROBLEM — N93.8 DUB (DYSFUNCTIONAL UTERINE BLEEDING): Status: ACTIVE | Noted: 2018-10-30

## 2022-03-20 PROBLEM — I10 WHITE COAT SYNDROME WITH HYPERTENSION: Status: ACTIVE | Noted: 2019-06-12

## 2022-05-22 DIAGNOSIS — M25.50 ARTHRALGIA, UNSPECIFIED JOINT: ICD-10-CM

## 2022-05-23 RX ORDER — NAPROXEN 500 MG/1
TABLET ORAL
Qty: 60 TABLET | Refills: 5 | Status: SHIPPED | OUTPATIENT
Start: 2022-05-23

## 2022-08-28 DIAGNOSIS — I10 ESSENTIAL HYPERTENSION: ICD-10-CM

## 2022-08-28 DIAGNOSIS — E11.9 TYPE 2 DIABETES MELLITUS WITHOUT COMPLICATION, WITHOUT LONG-TERM CURRENT USE OF INSULIN (HCC): ICD-10-CM

## 2022-08-30 RX ORDER — SITAGLIPTIN 100 MG/1
TABLET, FILM COATED ORAL
Qty: 30 TABLET | Refills: 5 | OUTPATIENT
Start: 2022-08-30

## 2022-08-30 RX ORDER — METFORMIN HYDROCHLORIDE 1000 MG/1
TABLET ORAL
Qty: 60 TABLET | Refills: 5 | OUTPATIENT
Start: 2022-08-30

## 2022-08-30 RX ORDER — HYDROCHLOROTHIAZIDE 25 MG/1
TABLET ORAL
Qty: 30 TABLET | Refills: 5 | OUTPATIENT
Start: 2022-08-30

## 2022-08-30 RX ORDER — GLIPIZIDE 5 MG/1
TABLET ORAL
Qty: 60 TABLET | Refills: 5 | OUTPATIENT
Start: 2022-08-30

## 2022-09-09 ENCOUNTER — OFFICE VISIT (OUTPATIENT)
Dept: FAMILY MEDICINE CLINIC | Age: 52
End: 2022-09-09
Payer: COMMERCIAL

## 2022-09-09 VITALS
HEART RATE: 76 BPM | BODY MASS INDEX: 36 KG/M2 | DIASTOLIC BLOOD PRESSURE: 85 MMHG | WEIGHT: 224 LBS | RESPIRATION RATE: 16 BRPM | HEIGHT: 66 IN | SYSTOLIC BLOOD PRESSURE: 145 MMHG | TEMPERATURE: 97.6 F | OXYGEN SATURATION: 99 %

## 2022-09-09 DIAGNOSIS — E11.69 HYPERLIPIDEMIA ASSOCIATED WITH TYPE 2 DIABETES MELLITUS (HCC): ICD-10-CM

## 2022-09-09 DIAGNOSIS — I10 ESSENTIAL HYPERTENSION: ICD-10-CM

## 2022-09-09 DIAGNOSIS — E78.5 HYPERLIPIDEMIA ASSOCIATED WITH TYPE 2 DIABETES MELLITUS (HCC): ICD-10-CM

## 2022-09-09 DIAGNOSIS — E11.65 TYPE 2 DIABETES MELLITUS WITH HYPERGLYCEMIA, WITHOUT LONG-TERM CURRENT USE OF INSULIN (HCC): Primary | ICD-10-CM

## 2022-09-09 PROCEDURE — 99214 OFFICE O/P EST MOD 30 MIN: CPT | Performed by: FAMILY MEDICINE

## 2022-09-09 PROCEDURE — 3046F HEMOGLOBIN A1C LEVEL >9.0%: CPT | Performed by: FAMILY MEDICINE

## 2022-09-09 RX ORDER — GLIPIZIDE 5 MG/1
5 TABLET ORAL 2 TIMES DAILY
Qty: 60 TABLET | Refills: 5 | Status: SHIPPED | OUTPATIENT
Start: 2022-09-09

## 2022-09-09 RX ORDER — HYDROCHLOROTHIAZIDE 25 MG/1
25 TABLET ORAL DAILY
Qty: 30 TABLET | Refills: 5 | Status: SHIPPED | OUTPATIENT
Start: 2022-09-09

## 2022-09-09 RX ORDER — ALISKIREN 300 MG/1
300 TABLET, FILM COATED ORAL DAILY
Qty: 90 TABLET | Refills: 4 | Status: SHIPPED | OUTPATIENT
Start: 2022-09-09

## 2022-09-09 RX ORDER — METFORMIN HYDROCHLORIDE 1000 MG/1
1000 TABLET ORAL 2 TIMES DAILY WITH MEALS
Qty: 60 TABLET | Refills: 5 | Status: SHIPPED | OUTPATIENT
Start: 2022-09-09

## 2022-09-09 NOTE — PROGRESS NOTES
1. \"Have you been to the ER, urgent care clinic since your last visit? Hospitalized since your last visit? \" No    2. \"Have you seen or consulted any other health care providers outside of the [address] [place] [address] since your last visit? \" No     3. For patients aged 39-70: Has the patient had a colonoscopy / FIT/ Cologuard? Yes - Care Gap present. Most recent result on file      If the patient is female:    4. For patients aged 41-77: Has the patient had a mammogram within the past 2 years? Yes - Care Gap present. Most recent result on file      5. For patients aged 21-65: Has the patient had a pap smear? Yes - Care Gap present.  Most recent result on file

## 2022-09-09 NOTE — PROGRESS NOTES
Chief Complaint   Patient presents with    Follow-up    Diabetes    Hypertension    Labs    Cholesterol Problem         Follow-up    Diabetes    Hypertension     Labs    Cholesterol Problem      Nicole Parkinson is a 45 y.o. female presenting today for delayed follow up of dm, htn, hld. Pt reports that she has lost many family members in the last year. She is coping ok. Her sugars are up and down. She had not been exercising due to family commitments. No recent labs. Patient does need medication refills today. New concerns today: none      Review of Systems   Constitutional: Negative. HENT: Negative. Respiratory: Negative. Cardiovascular: Negative. All other systems reviewed and are negative. Physical Exam  Vitals and nursing note reviewed. Constitutional:       Appearance: Normal appearance. She is not ill-appearing. HENT:      Head: Normocephalic and atraumatic. Right Ear: External ear normal.      Left Ear: External ear normal.      Nose: Nose normal.      Mouth/Throat:      Mouth: Mucous membranes are moist.   Eyes:      Extraocular Movements: Extraocular movements intact. Conjunctiva/sclera: Conjunctivae normal.   Cardiovascular:      Rate and Rhythm: Normal rate and regular rhythm. Heart sounds: No murmur heard. No friction rub. No gallop. Pulmonary:      Effort: Pulmonary effort is normal.      Breath sounds: Normal breath sounds. No wheezing, rhonchi or rales. Musculoskeletal:         General: Normal range of motion. Cervical back: Normal range of motion. Skin:     General: Skin is warm and dry. Neurological:      Mental Status: She is alert and oriented to person, place, and time. Coordination: Coordination normal.   Psychiatric:         Mood and Affect: Mood normal.         Behavior: Behavior normal.         Thought Content:  Thought content normal.         Judgment: Judgment normal.       Diagnoses and all orders for this visit: 1. Type 2 diabetes mellitus with hyperglycemia, without long-term current use of insulin (HCC)  -     HEMOGLOBIN A1C WITH EAG; Future  -     MICROALBUMIN, UR, RAND W/ MICROALB/CREAT RATIO; Future  -     SITagliptin (Januvia) 100 mg tablet; Take 1 Tablet by mouth daily. -     metFORMIN (GLUCOPHAGE) 1,000 mg tablet; Take 1 Tablet by mouth two (2) times daily (with meals). -     glipiZIDE (GLUCOTROL) 5 mg tablet; Take 1 Tablet by mouth two (2) times a day. 2. Essential hypertension  -     METABOLIC PANEL, COMPREHENSIVE; Future  -     LIPID PANEL; Future  -     hydroCHLOROthiazide (HYDRODIURIL) 25 mg tablet; Take 1 Tablet by mouth daily. -     aliskiren (TEKTURNA) 300 mg tablet; Take 1 Tablet by mouth daily. 3. Hyperlipidemia associated with type 2 diabetes mellitus (HCC)  -     METABOLIC PANEL, COMPREHENSIVE; Future  -     LIPID PANEL; Future    Follow-up and Dispositions    Return in about 3 months (around 12/9/2022) for diabetes, high blood pressure, high cholesterol, lab review.

## 2022-12-06 DIAGNOSIS — M25.50 ARTHRALGIA, UNSPECIFIED JOINT: ICD-10-CM

## 2022-12-07 RX ORDER — NAPROXEN 500 MG/1
TABLET ORAL
Qty: 60 TABLET | Refills: 5 | Status: SHIPPED | OUTPATIENT
Start: 2022-12-07

## 2022-12-08 ENCOUNTER — HOSPITAL ENCOUNTER (OUTPATIENT)
Dept: LAB | Age: 52
Discharge: HOME | End: 2022-12-08

## 2022-12-08 LAB — SENTARA SPECIMEN COL,SENBCF: NORMAL

## 2022-12-08 PROCEDURE — 99001 SPECIMEN HANDLING PT-LAB: CPT

## 2022-12-09 LAB
A-G RATIO,AGRAT: 1.5 RATIO (ref 1.1–2.6)
ALBUMIN SERPL-MCNC: 4.1 G/DL (ref 3.5–5)
ALP SERPL-CCNC: 139 U/L (ref 25–115)
ALT SERPL-CCNC: 36 U/L (ref 5–40)
ANION GAP SERPL CALC-SCNC: 11 MMOL/L (ref 3–15)
AST SERPL W P-5'-P-CCNC: 18 U/L (ref 10–37)
AVG GLU, 10930: 308 MG/DL (ref 91–123)
BILIRUB SERPL-MCNC: 0.1 MG/DL (ref 0.2–1.2)
BUN SERPL-MCNC: 22 MG/DL (ref 6–22)
CALCIUM SERPL-MCNC: 10.1 MG/DL (ref 8.4–10.5)
CHLORIDE SERPL-SCNC: 102 MMOL/L (ref 98–110)
CHOLEST SERPL-MCNC: 195 MG/DL (ref 110–200)
CO2 SERPL-SCNC: 25 MMOL/L (ref 20–32)
CREAT SERPL-MCNC: 1 MG/DL (ref 0.5–1.2)
CREATININE, URINE: 72 MG/DL
GLOBULIN,GLOB: 2.7 G/DL (ref 2–4)
GLOMERULAR FILTRATION RATE: >60 ML/MIN/1.73 SQ.M.
GLUCOSE SERPL-MCNC: 258 MG/DL (ref 70–99)
HBA1C MFR BLD HPLC: 12.4 % (ref 4.8–5.6)
HDLC SERPL-MCNC: 38 MG/DL
HDLC SERPL-MCNC: 5.1 MG/DL (ref 0–5)
LDL/HDL RATIO,LDHD: 3.3
LDLC SERPL CALC-MCNC: 125 MG/DL (ref 50–99)
MICROALB/CREAT RATIO, 140286: 72.8 (ref 0–30)
MICROALBUMIN,URINE RANDOM 140054: 52.4 MG/L (ref 0.1–17)
NON-HDL CHOLESTEROL, 011976: 157 MG/DL
POTASSIUM SERPL-SCNC: 4.1 MMOL/L (ref 3.5–5.5)
PROT SERPL-MCNC: 6.8 G/DL (ref 6.4–8.3)
SODIUM SERPL-SCNC: 138 MMOL/L (ref 133–145)
TRIGL SERPL-MCNC: 158 MG/DL (ref 40–149)
VLDLC SERPL CALC-MCNC: 32 MG/DL (ref 8–30)

## 2022-12-13 ENCOUNTER — OFFICE VISIT (OUTPATIENT)
Dept: FAMILY MEDICINE CLINIC | Age: 52
End: 2022-12-13
Payer: COMMERCIAL

## 2022-12-13 VITALS
BODY MASS INDEX: 35.86 KG/M2 | DIASTOLIC BLOOD PRESSURE: 98 MMHG | SYSTOLIC BLOOD PRESSURE: 201 MMHG | OXYGEN SATURATION: 100 % | TEMPERATURE: 98.1 F | WEIGHT: 222.2 LBS | RESPIRATION RATE: 18 BRPM | HEART RATE: 88 BPM

## 2022-12-13 DIAGNOSIS — E78.5 HYPERLIPIDEMIA ASSOCIATED WITH TYPE 2 DIABETES MELLITUS (HCC): ICD-10-CM

## 2022-12-13 DIAGNOSIS — E11.65 TYPE 2 DIABETES MELLITUS WITH HYPERGLYCEMIA, WITHOUT LONG-TERM CURRENT USE OF INSULIN (HCC): Primary | ICD-10-CM

## 2022-12-13 DIAGNOSIS — I10 ESSENTIAL HYPERTENSION: ICD-10-CM

## 2022-12-13 DIAGNOSIS — E11.69 HYPERLIPIDEMIA ASSOCIATED WITH TYPE 2 DIABETES MELLITUS (HCC): ICD-10-CM

## 2022-12-13 PROCEDURE — 99214 OFFICE O/P EST MOD 30 MIN: CPT | Performed by: FAMILY MEDICINE

## 2022-12-13 PROCEDURE — 3078F DIAST BP <80 MM HG: CPT | Performed by: FAMILY MEDICINE

## 2022-12-13 PROCEDURE — 3074F SYST BP LT 130 MM HG: CPT | Performed by: FAMILY MEDICINE

## 2022-12-13 PROCEDURE — 3046F HEMOGLOBIN A1C LEVEL >9.0%: CPT | Performed by: FAMILY MEDICINE

## 2022-12-13 NOTE — PROGRESS NOTES
Chief Complaint   Patient presents with    Diabetes    Labs         HPI    Riana Neves is a 47 y.o. female presenting today for 3 months  follow up of dm, htn, hld. Pt has been doing well overall. However, she admits that she has been eating a lot of candy over the last 2 months. Patient had labs on 12/8/22. Labs reviewed in detail with patient     Patient does not need medication refills today. New concerns today: none      Review of Systems   Constitutional: Negative. HENT: Negative. Respiratory: Negative. Cardiovascular: Negative. All other systems reviewed and are negative. Physical Exam  Vitals and nursing note reviewed. Constitutional:       Appearance: Normal appearance. She is not ill-appearing. HENT:      Head: Normocephalic and atraumatic. Right Ear: External ear normal.      Left Ear: External ear normal.      Nose: Nose normal.      Mouth/Throat:      Mouth: Mucous membranes are moist.   Eyes:      Extraocular Movements: Extraocular movements intact. Conjunctiva/sclera: Conjunctivae normal.   Cardiovascular:      Rate and Rhythm: Normal rate and regular rhythm. Heart sounds: No murmur heard. No friction rub. No gallop. Pulmonary:      Effort: Pulmonary effort is normal.      Breath sounds: Normal breath sounds. No wheezing, rhonchi or rales. Musculoskeletal:         General: Normal range of motion. Cervical back: Normal range of motion. Skin:     General: Skin is warm and dry. Neurological:      Mental Status: She is alert and oriented to person, place, and time. Coordination: Coordination normal.   Psychiatric:         Mood and Affect: Mood normal.         Behavior: Behavior normal.         Thought Content: Thought content normal.         Judgment: Judgment normal.       Diagnoses and all orders for this visit:    1.  Type 2 diabetes mellitus with hyperglycemia, without long-term current use of insulin (HCC)  Start and titrate rybelsus monthly. Cont close home monitoring. D/c candy intake  -     semaglutide (Rybelsus) 3 mg tablet; Take 1 Tablet by mouth Daily (before breakfast). -     semaglutide (Rybelsus) 7 mg tablet; Take 1 Tablet by mouth Daily (before breakfast). -     semaglutide (RYBELSUS) 14 mg tablet; Take 1 Tablet by mouth Daily (before breakfast). -     METABOLIC PANEL, COMPREHENSIVE; Future  -     LIPID PANEL; Future  -     HEMOGLOBIN A1C WITH EAG; Future  -     MICROALBUMIN, UR, RAND W/ MICROALB/CREAT RATIO; Future    2. Hyperlipidemia associated with type 2 diabetes mellitus (HCC)  -     METABOLIC PANEL, COMPREHENSIVE; Future  -     LIPID PANEL; Future    3. Essential hypertension  Recommend routine home blood pressure monitoring.  -     METABOLIC PANEL, COMPREHENSIVE; Future  -     LIPID PANEL; Future    Follow-up and Dispositions    Return in about 3 months (around 3/13/2023) for diabetes, high blood pressure, high cholesterol.

## 2022-12-13 NOTE — PROGRESS NOTES
Riana Neves presents today for   Chief Complaint   Patient presents with    Diabetes    Labs       Vitals:    12/13/22 1006   BP: (!) 201/98   Pulse: 88   Resp: 18   Temp: 98.1 °F (36.7 °C)   TempSrc: Oral   SpO2: 100%   Weight: 222 lb 3.2 oz (100.8 kg)        Christian Olivera preferred language for health care discussion is english/other. Is someone accompanying this pt? no    Is the patient using any DME equipment during [address]? no    Depression Screening:  3 most recent PHQ Screens 9/9/2022   PHQ Not Done -   Little interest or pleasure in doing things Not at all   Feeling down, depressed, irritable, or hopeless Not at all   Total Score PHQ 2 0   Trouble falling or staying asleep, or sleeping too much -   Feeling tired or having little energy -   Poor appetite, weight loss, or overeating -   Feeling bad about yourself - or that you are a failure or have let yourself or your family down -   Trouble concentrating on things such as school, work, reading, or watching TV -   Moving or speaking so slowly that other people could have noticed; or the opposite being so fidgety that others notice -   Thoughts of being better off dead, or hurting yourself in some way -   PHQ 9 Score -   How difficult have these problems made it for you to do your work, take care of your home and get along with others -       Learning Assessment:  Learning Assessment 4/21/2021   PRIMARY LEARNER Patient   HIGHEST LEVEL OF EDUCATION - PRIMARY LEARNER  > 4 YEARS OF COLLEGE   BARRIERS PRIMARY LEARNER NONE   CO-LEARNER CAREGIVER No   PRIMARY LANGUAGE ENGLISH   LEARNER PREFERENCE PRIMARY LISTENING   ANSWERED BY Patient   RELATIONSHIP SELF       Abuse Screening:  Abuse Screening Questionnaire 4/21/2021   Do you ever feel afraid of your partner? N   Are you in a relationship with someone who physically or mentally threatens you? N   Is it safe for you to go home? Y       Generalized Anxiety  No flowsheet data found.       Health Maintenance Due Topic Date Due    Hepatitis C Screening  Never done    COVID-19 Vaccine (1) Never done    Pneumococcal 0-64 years (1 - PCV) Never done    Foot Exam Q1  Never done    Hepatitis B Vaccine (1 of 3 - Risk 3-dose series) Never done    Shingrix Vaccine Age 50> (1 of 2) Never done    Eye Exam Retinal or Dilated  03/25/2022   . Health Maintenance reviewed and discussed and ordered per Provider. VACCINES DUE   SCREENINGS DUE       Bryan Butch is updated on all HM    1. \"Have you been to the ER, urgent care clinic since your last visit? Hospitalized since your last visit? \" No    2. \"Have you seen or consulted any other health care providers outside of the [address] [place] [address] since your last visit? \" No     3. For patients aged 39-70: Has the patient had a colonoscopy / FIT/ Cologuard? Yes - no Care Gap present     If the patient is female:    4. For patients aged 41-77: Has the patient had a mammogram within the past 2 years? Yes - no Care Gap present    5. For patients aged 21-65: Has the patient had a pap smear?  Yes - no Care Gap present

## 2023-01-12 DIAGNOSIS — E11.65 TYPE 2 DIABETES MELLITUS WITH HYPERGLYCEMIA, WITHOUT LONG-TERM CURRENT USE OF INSULIN (HCC): ICD-10-CM

## 2023-01-12 RX ORDER — ORAL SEMAGLUTIDE 7 MG/1
TABLET ORAL
Qty: 30 TABLET | Refills: 0 | Status: SHIPPED | OUTPATIENT
Start: 2023-01-12

## 2023-02-15 DIAGNOSIS — J30.1 ALLERGIC RHINITIS DUE TO POLLEN: ICD-10-CM

## 2023-02-15 RX ORDER — FLUTICASONE PROPIONATE 50 MCG
SPRAY, SUSPENSION (ML) NASAL
Qty: 1 EACH | Refills: 12 | Status: SHIPPED | OUTPATIENT
Start: 2023-02-15

## 2023-03-03 ENCOUNTER — HOSPITAL ENCOUNTER (OUTPATIENT)
Facility: HOSPITAL | Age: 53
Discharge: HOME | End: 2023-03-06

## 2023-03-03 LAB — SENTARA SPECIMEN COLLECTION: NORMAL

## 2023-03-03 PROCEDURE — 99001 SPECIMEN HANDLING PT-LAB: CPT

## 2023-03-04 LAB
A/G RATIO: 1.6 RATIO (ref 1.1–2.6)
ALBUMIN SERPL-MCNC: 4.3 G/DL (ref 3.5–5)
ALP BLD-CCNC: 128 U/L (ref 25–115)
ALT SERPL-CCNC: 27 U/L (ref 5–40)
ANION GAP SERPL CALCULATED.3IONS-SCNC: 10 MMOL/L (ref 3–15)
AST SERPL-CCNC: 14 U/L (ref 10–37)
AVERAGE GLUCOSE: 270 MG/DL (ref 91–123)
BILIRUB SERPL-MCNC: 0.1 MG/DL (ref 0.2–1.2)
BUN BLDV-MCNC: 9 MG/DL (ref 6–22)
CALCIUM SERPL-MCNC: 10 MG/DL (ref 8.4–10.5)
CHLORIDE BLD-SCNC: 99 MMOL/L (ref 98–110)
CHOLESTEROL/HDL RATIO: 5.4 (ref 0–5)
CHOLESTEROL: 227 MG/DL (ref 110–200)
CO2: 27 MMOL/L (ref 20–32)
CREAT SERPL-MCNC: 1 MG/DL (ref 0.5–1.2)
CREATININE URINE: 170 MG/DL
GLOBULIN: 2.7 G/DL (ref 2–4)
GLOMERULAR FILTRATION RATE: >60 ML/MIN/1.73 SQ.M.
GLUCOSE: 121 MG/DL (ref 70–99)
HBA1C MFR BLD: 11 % (ref 4.8–5.6)
HDLC SERPL-MCNC: 42 MG/DL
LDL CHOLESTEROL CALCULATED: 151 MG/DL (ref 50–99)
LDL/HDL RATIO: 3.6
MICROALB/CREAT RATIO (UG/MG CREAT.): 18.2 (ref 0–30)
MICROALBUMIN/CREAT 24H UR: 31 MG/L (ref 0.1–17)
NON-HDL CHOLESTEROL: 185 MG/DL
POTASSIUM SERPL-SCNC: 3.9 MMOL/L (ref 3.5–5.5)
SODIUM BLD-SCNC: 136 MMOL/L (ref 133–145)
TOTAL PROTEIN: 7 G/DL (ref 6.4–8.3)
TRIGL SERPL-MCNC: 170 MG/DL (ref 40–149)
VLDLC SERPL CALC-MCNC: 34 MG/DL (ref 8–30)

## 2023-03-10 DIAGNOSIS — E11.65 TYPE 2 DIABETES MELLITUS WITH HYPERGLYCEMIA (HCC): ICD-10-CM

## 2023-03-10 DIAGNOSIS — I10 ESSENTIAL (PRIMARY) HYPERTENSION: ICD-10-CM

## 2023-03-13 ENCOUNTER — OFFICE VISIT (OUTPATIENT)
Facility: CLINIC | Age: 53
End: 2023-03-13
Payer: COMMERCIAL

## 2023-03-13 VITALS
WEIGHT: 214.8 LBS | HEART RATE: 74 BPM | BODY MASS INDEX: 34.67 KG/M2 | OXYGEN SATURATION: 99 % | SYSTOLIC BLOOD PRESSURE: 143 MMHG | TEMPERATURE: 97.8 F | DIASTOLIC BLOOD PRESSURE: 84 MMHG

## 2023-03-13 DIAGNOSIS — E11.65 TYPE 2 DIABETES MELLITUS WITH HYPERGLYCEMIA, WITHOUT LONG-TERM CURRENT USE OF INSULIN (HCC): ICD-10-CM

## 2023-03-13 DIAGNOSIS — E11.69 HYPERLIPIDEMIA ASSOCIATED WITH TYPE 2 DIABETES MELLITUS (HCC): ICD-10-CM

## 2023-03-13 DIAGNOSIS — E78.5 HYPERLIPIDEMIA ASSOCIATED WITH TYPE 2 DIABETES MELLITUS (HCC): ICD-10-CM

## 2023-03-13 DIAGNOSIS — I10 ESSENTIAL (PRIMARY) HYPERTENSION: ICD-10-CM

## 2023-03-13 DIAGNOSIS — E11.65 TYPE 2 DIABETES MELLITUS WITH HYPERGLYCEMIA, WITHOUT LONG-TERM CURRENT USE OF INSULIN (HCC): Primary | ICD-10-CM

## 2023-03-13 DIAGNOSIS — R33.9 URINARY RETENTION: ICD-10-CM

## 2023-03-13 DIAGNOSIS — N30.01 ACUTE CYSTITIS WITH HEMATURIA: ICD-10-CM

## 2023-03-13 DIAGNOSIS — L30.9 DERMATITIS: ICD-10-CM

## 2023-03-13 LAB
BILIRUBIN, URINE, POC: NEGATIVE
BLOOD URINE, POC: NORMAL
GLUCOSE URINE, POC: NEGATIVE
KETONES, URINE, POC: NEGATIVE
LEUKOCYTE ESTERASE, URINE, POC: NORMAL
NITRITE, URINE, POC: NEGATIVE
PH, URINE, POC: 7.5 (ref 4.6–8)
PROTEIN,URINE, POC: NORMAL
SPECIFIC GRAVITY, URINE, POC: 1.02 (ref 1–1.03)
URINALYSIS CLARITY, POC: NORMAL
URINALYSIS COLOR, POC: YELLOW
UROBILINOGEN, POC: NORMAL

## 2023-03-13 PROCEDURE — 81003 URINALYSIS AUTO W/O SCOPE: CPT | Performed by: FAMILY MEDICINE

## 2023-03-13 PROCEDURE — 99214 OFFICE O/P EST MOD 30 MIN: CPT | Performed by: FAMILY MEDICINE

## 2023-03-13 PROCEDURE — 3074F SYST BP LT 130 MM HG: CPT | Performed by: FAMILY MEDICINE

## 2023-03-13 PROCEDURE — 3078F DIAST BP <80 MM HG: CPT | Performed by: FAMILY MEDICINE

## 2023-03-13 PROCEDURE — 3046F HEMOGLOBIN A1C LEVEL >9.0%: CPT | Performed by: FAMILY MEDICINE

## 2023-03-13 RX ORDER — TRIAMCINOLONE ACETONIDE 1 MG/G
CREAM TOPICAL 2 TIMES DAILY PRN
Qty: 45 G | Refills: 5 | Status: SHIPPED | OUTPATIENT
Start: 2023-03-13

## 2023-03-13 RX ORDER — CIPROFLOXACIN 250 MG/1
250 TABLET, FILM COATED ORAL 2 TIMES DAILY
Qty: 6 TABLET | Refills: 0 | Status: SHIPPED | OUTPATIENT
Start: 2023-03-13 | End: 2023-03-16

## 2023-03-13 SDOH — ECONOMIC STABILITY: HOUSING INSECURITY
IN THE LAST 12 MONTHS, WAS THERE A TIME WHEN YOU DID NOT HAVE A STEADY PLACE TO SLEEP OR SLEPT IN A SHELTER (INCLUDING NOW)?: NO

## 2023-03-13 SDOH — ECONOMIC STABILITY: FOOD INSECURITY: WITHIN THE PAST 12 MONTHS, THE FOOD YOU BOUGHT JUST DIDN'T LAST AND YOU DIDN'T HAVE MONEY TO GET MORE.: NEVER TRUE

## 2023-03-13 SDOH — ECONOMIC STABILITY: INCOME INSECURITY: HOW HARD IS IT FOR YOU TO PAY FOR THE VERY BASICS LIKE FOOD, HOUSING, MEDICAL CARE, AND HEATING?: NOT HARD AT ALL

## 2023-03-13 SDOH — ECONOMIC STABILITY: FOOD INSECURITY: WITHIN THE PAST 12 MONTHS, YOU WORRIED THAT YOUR FOOD WOULD RUN OUT BEFORE YOU GOT MONEY TO BUY MORE.: NEVER TRUE

## 2023-03-13 ASSESSMENT — ENCOUNTER SYMPTOMS: RESPIRATORY NEGATIVE: 1

## 2023-03-13 ASSESSMENT — PATIENT HEALTH QUESTIONNAIRE - PHQ9
1. LITTLE INTEREST OR PLEASURE IN DOING THINGS: 0
2. FEELING DOWN, DEPRESSED OR HOPELESS: 0
SUM OF ALL RESPONSES TO PHQ QUESTIONS 1-9: 0
SUM OF ALL RESPONSES TO PHQ QUESTIONS 1-9: 0
SUM OF ALL RESPONSES TO PHQ9 QUESTIONS 1 & 2: 0
SUM OF ALL RESPONSES TO PHQ QUESTIONS 1-9: 0
SUM OF ALL RESPONSES TO PHQ QUESTIONS 1-9: 0

## 2023-03-13 NOTE — PROGRESS NOTES
1. \"Have you been to the ER, urgent care clinic since your last visit? Hospitalized since your last visit? \" No    2. \"Have you seen or consulted any other health care providers outside of the [address] [place] [address] since your last visit? \" No     3. For patients aged 39-70: Has the patient had a colonoscopy / FIT/ Cologuard? Yes - no Care Gap present      If the patient is female:    4. For patients aged 41-77: Has the patient had a mammogram within the past 2 years? Yes - no Care Gap present      5. For patients aged 21-65: Has the patient had a pap smear?  Yes - no Care Gap present
Cardiovascular: Negative. Genitourinary:  Positive for dysuria, frequency and urgency. All other systems reviewed and are negative. Physical Exam  Vitals and nursing note reviewed. Constitutional:       General: She is not in acute distress. Appearance: Normal appearance. HENT:      Head: Normocephalic and atraumatic. Right Ear: External ear normal.      Left Ear: External ear normal.      Nose: Nose normal.      Mouth/Throat:      Mouth: Mucous membranes are moist.   Eyes:      Extraocular Movements: Extraocular movements intact. Conjunctiva/sclera: Conjunctivae normal.   Cardiovascular:      Rate and Rhythm: Normal rate and regular rhythm. Heart sounds: No murmur heard. No friction rub. No gallop. Pulmonary:      Effort: Pulmonary effort is normal.      Breath sounds: Normal breath sounds. No wheezing, rhonchi or rales. Abdominal:      General: Bowel sounds are normal.      Palpations: Abdomen is soft. Tenderness: There is no abdominal tenderness. There is no right CVA tenderness or left CVA tenderness. Musculoskeletal:         General: Normal range of motion. Cervical back: Normal range of motion. Skin:     General: Skin is warm and dry. Neurological:      Mental Status: She is alert and oriented to person, place, and time. Coordination: Coordination normal.   Psychiatric:         Mood and Affect: Mood normal.         Behavior: Behavior normal.         Thought Content: Thought content normal.         Judgment: Judgment normal.                   An electronic signature was used to authenticate this note.     --Marni Gamble MD

## 2023-03-14 DIAGNOSIS — I10 ESSENTIAL (PRIMARY) HYPERTENSION: ICD-10-CM

## 2023-03-14 DIAGNOSIS — E11.65 TYPE 2 DIABETES MELLITUS WITH HYPERGLYCEMIA (HCC): ICD-10-CM

## 2023-03-14 RX ORDER — SITAGLIPTIN 100 MG/1
TABLET, FILM COATED ORAL
Qty: 90 TABLET | Refills: 3 | Status: SHIPPED | OUTPATIENT
Start: 2023-03-14

## 2023-03-14 RX ORDER — HYDROCHLOROTHIAZIDE 25 MG/1
TABLET ORAL
Qty: 90 TABLET | Refills: 3 | Status: SHIPPED | OUTPATIENT
Start: 2023-03-14

## 2023-03-14 RX ORDER — GLIPIZIDE 5 MG/1
TABLET ORAL
Qty: 180 TABLET | Refills: 3 | Status: SHIPPED | OUTPATIENT
Start: 2023-03-14

## 2023-03-14 NOTE — TELEPHONE ENCOUNTER
MsYael nAa Duggan is calling this morning out of her Januvia x2 days now   Requesting a refill for :  Januvia 100mg  Metformin 1000mg  Hydrochlorothiazide 25 mg  Glipizide 5mg    Pt uses [facility] [address]

## 2023-03-15 RX ORDER — HYDROCHLOROTHIAZIDE 25 MG/1
25 TABLET ORAL DAILY
Qty: 90 TABLET | Refills: 3 | OUTPATIENT
Start: 2023-03-15

## 2023-03-15 RX ORDER — GLIPIZIDE 5 MG/1
5 TABLET ORAL
Qty: 180 TABLET | Refills: 3 | OUTPATIENT
Start: 2023-03-15

## 2023-03-16 LAB — RESULT: ABNORMAL

## 2023-03-21 ENCOUNTER — HOSPITAL ENCOUNTER (OUTPATIENT)
Facility: HOSPITAL | Age: 53
Discharge: HOME | End: 2023-03-24
Payer: COMMERCIAL

## 2023-03-21 DIAGNOSIS — Z12.31 ENCOUNTER FOR SCREENING MAMMOGRAM FOR BREAST CANCER: ICD-10-CM

## 2023-03-21 PROCEDURE — 77067 SCR MAMMO BI INCL CAD: CPT

## 2023-05-02 DIAGNOSIS — E11.65 TYPE 2 DIABETES MELLITUS WITH HYPERGLYCEMIA, WITHOUT LONG-TERM CURRENT USE OF INSULIN (HCC): ICD-10-CM

## 2023-06-29 RX ORDER — ALISKIREN 300 MG/1
TABLET, FILM COATED ORAL
Qty: 90 TABLET | Refills: 4 | Status: SHIPPED | OUTPATIENT
Start: 2023-06-29

## 2023-08-10 DIAGNOSIS — E11.65 TYPE 2 DIABETES MELLITUS WITH HYPERGLYCEMIA (HCC): ICD-10-CM

## 2023-08-10 RX ORDER — ORAL SEMAGLUTIDE 14 MG/1
TABLET ORAL
Qty: 90 TABLET | Refills: 1 | OUTPATIENT
Start: 2023-08-10

## 2023-08-26 ENCOUNTER — HOSPITAL ENCOUNTER (EMERGENCY)
Facility: HOSPITAL | Age: 53
Discharge: HOME | End: 2023-08-26
Attending: STUDENT IN AN ORGANIZED HEALTH CARE EDUCATION/TRAINING PROGRAM
Payer: COMMERCIAL

## 2023-08-26 VITALS
HEIGHT: 66 IN | SYSTOLIC BLOOD PRESSURE: 199 MMHG | OXYGEN SATURATION: 100 % | RESPIRATION RATE: 16 BRPM | WEIGHT: 215 LBS | DIASTOLIC BLOOD PRESSURE: 79 MMHG | TEMPERATURE: 97.7 F | HEART RATE: 56 BPM | BODY MASS INDEX: 34.55 KG/M2

## 2023-08-26 DIAGNOSIS — M25.571 ACUTE RIGHT ANKLE PAIN: Primary | ICD-10-CM

## 2023-08-26 PROCEDURE — 99282 EMERGENCY DEPT VISIT SF MDM: CPT

## 2023-08-26 ASSESSMENT — PAIN DESCRIPTION - LOCATION: LOCATION: ANKLE;FOOT

## 2023-08-26 ASSESSMENT — PAIN - FUNCTIONAL ASSESSMENT: PAIN_FUNCTIONAL_ASSESSMENT: 0-10

## 2023-08-26 ASSESSMENT — PAIN DESCRIPTION - ORIENTATION: ORIENTATION: RIGHT

## 2023-08-26 ASSESSMENT — PAIN SCALES - GENERAL: PAINLEVEL_OUTOF10: 8

## 2023-08-26 NOTE — ED TRIAGE NOTES
A&O female with c/o right foot and ankle pain. Began while at work the night of 8/24. Pain increasing tonight while at work. Denies injury/trauma.

## 2023-08-26 NOTE — ED NOTES
I have reviewed discharge instructions with the patient. The patient verbalized understanding.       Arnaldo Son RN  08/26/23 9824

## 2023-09-12 ENCOUNTER — HOSPITAL ENCOUNTER (OUTPATIENT)
Facility: HOSPITAL | Age: 53
Discharge: HOME | End: 2023-09-15

## 2023-09-12 LAB — SENTARA SPECIMEN COLLECTION: NORMAL

## 2023-09-13 LAB
A/G RATIO: 1.6 RATIO (ref 1.1–2.6)
ALBUMIN SERPL-MCNC: 4.1 G/DL (ref 3.5–5)
ALP BLD-CCNC: 121 U/L (ref 25–115)
ALT SERPL-CCNC: 26 U/L (ref 5–40)
ANION GAP SERPL CALCULATED.3IONS-SCNC: 11 MMOL/L (ref 3–15)
AST SERPL-CCNC: 21 U/L (ref 10–37)
AVERAGE GLUCOSE: 272 MG/DL (ref 91–123)
BILIRUB SERPL-MCNC: 0.1 MG/DL (ref 0.2–1.2)
BUN BLDV-MCNC: 17 MG/DL (ref 6–22)
CALCIUM SERPL-MCNC: 9.5 MG/DL (ref 8.4–10.5)
CHLORIDE BLD-SCNC: 96 MMOL/L (ref 98–110)
CHOLESTEROL/HDL RATIO: 5.1 (ref 0–5)
CHOLESTEROL: 209 MG/DL (ref 110–200)
CO2: 26 MMOL/L (ref 20–32)
CREAT SERPL-MCNC: 1 MG/DL (ref 0.5–1.2)
CREATININE URINE: 54 MG/DL
GLOBULIN: 2.6 G/DL (ref 2–4)
GLOMERULAR FILTRATION RATE: >60 ML/MIN/1.73 SQ.M.
GLUCOSE: 186 MG/DL (ref 70–99)
HBA1C MFR BLD: 11.1 % (ref 4.8–5.6)
HDLC SERPL-MCNC: 41 MG/DL
LDL CHOLESTEROL CALCULATED: 140 MG/DL (ref 50–99)
LDL/HDL RATIO: 3.4
MICROALB/CREAT RATIO (UG/MG CREAT.): 90.6 (ref 0–30)
MICROALBUMIN/CREAT 24H UR: 48.9 MG/L (ref 0.1–17)
NON-HDL CHOLESTEROL: 168 MG/DL
POTASSIUM SERPL-SCNC: 3.7 MMOL/L (ref 3.5–5.5)
SODIUM BLD-SCNC: 133 MMOL/L (ref 133–145)
TOTAL PROTEIN: 6.7 G/DL (ref 6.4–8.3)
TRIGL SERPL-MCNC: 136 MG/DL (ref 40–149)
VLDLC SERPL CALC-MCNC: 27 MG/DL (ref 8–30)

## 2023-09-14 ENCOUNTER — OFFICE VISIT (OUTPATIENT)
Facility: CLINIC | Age: 53
End: 2023-09-14
Payer: COMMERCIAL

## 2023-09-14 VITALS
BODY MASS INDEX: 34.86 KG/M2 | DIASTOLIC BLOOD PRESSURE: 88 MMHG | OXYGEN SATURATION: 98 % | HEART RATE: 68 BPM | SYSTOLIC BLOOD PRESSURE: 160 MMHG | WEIGHT: 216 LBS | TEMPERATURE: 97.8 F

## 2023-09-14 DIAGNOSIS — E11.65 TYPE 2 DIABETES MELLITUS WITH HYPERGLYCEMIA, WITHOUT LONG-TERM CURRENT USE OF INSULIN (HCC): Primary | ICD-10-CM

## 2023-09-14 DIAGNOSIS — E11.69 HYPERLIPIDEMIA ASSOCIATED WITH TYPE 2 DIABETES MELLITUS (HCC): ICD-10-CM

## 2023-09-14 DIAGNOSIS — I10 ESSENTIAL (PRIMARY) HYPERTENSION: ICD-10-CM

## 2023-09-14 DIAGNOSIS — E78.5 HYPERLIPIDEMIA ASSOCIATED WITH TYPE 2 DIABETES MELLITUS (HCC): ICD-10-CM

## 2023-09-14 DIAGNOSIS — E11.65 TYPE 2 DIABETES MELLITUS WITH HYPERGLYCEMIA, WITHOUT LONG-TERM CURRENT USE OF INSULIN (HCC): ICD-10-CM

## 2023-09-14 PROCEDURE — 3074F SYST BP LT 130 MM HG: CPT | Performed by: FAMILY MEDICINE

## 2023-09-14 PROCEDURE — 3078F DIAST BP <80 MM HG: CPT | Performed by: FAMILY MEDICINE

## 2023-09-14 PROCEDURE — 99214 OFFICE O/P EST MOD 30 MIN: CPT | Performed by: FAMILY MEDICINE

## 2023-09-14 PROCEDURE — 3046F HEMOGLOBIN A1C LEVEL >9.0%: CPT | Performed by: FAMILY MEDICINE

## 2023-09-14 ASSESSMENT — PATIENT HEALTH QUESTIONNAIRE - PHQ9
2. FEELING DOWN, DEPRESSED OR HOPELESS: 0
SUM OF ALL RESPONSES TO PHQ9 QUESTIONS 1 & 2: 0
SUM OF ALL RESPONSES TO PHQ QUESTIONS 1-9: 0
1. LITTLE INTEREST OR PLEASURE IN DOING THINGS: 0
SUM OF ALL RESPONSES TO PHQ QUESTIONS 1-9: 0

## 2023-09-14 NOTE — PROGRESS NOTES
ASSESSMENT/PLAN:  1. Type 2 diabetes mellitus with hyperglycemia, without long-term current use of insulin ([address])  Not at goal.  [facility] - Pharmacist HR Facilities-Ayden Rameyl ([facility])  -     Comprehensive Metabolic Panel; Future  -     Lipid Panel; Future  -     Hemoglobin A1C; Future  -     Microalbumin / Creatinine Urine Ratio; Future    2. Hyperlipidemia associated with type 2 diabetes mellitus ([address])  -     Comprehensive Metabolic Panel; Future  -     Lipid Panel; Future    3. Essential (primary) hypertension  Not at goal.  Often elevated in ofc. Cont home bp monitoring.   -     Comprehensive Metabolic Panel; Future  -     Lipid Panel; Future        Return in about 3 months (around 12/14/2023) for DM, HTN, HLD, lab review, specialist eval.      SUBJECTIVE/OBJECTIVE:    Chief Complaint   Patient presents with    Cholesterol Problem    Hypertension    Diabetes         HPI    Angle Gomez is a 47 y.o. female presenting today for 3 months  follow up of dm, htn, hld. Patient had labs on 9/12/23. Labs reviewed in detail with patient     Pt had recent ER visit for eval of R ankle pain that started while at work. She was to f/u with podiatry. Bp was elevated but pt reports that she had not had her bp meds. Patient does not need medication refills today. New concerns today: none        Review of Systems   Constitutional: Negative. HENT: Negative. Respiratory: Negative. Cardiovascular: Negative. Musculoskeletal:  Positive for arthralgias. All other systems reviewed and are negative. Physical Exam  Vitals and nursing note reviewed. Constitutional:       General: She is not in acute distress. Appearance: Normal appearance. HENT:      Head: Normocephalic and atraumatic.       Right Ear: External ear normal.      Left Ear: External ear normal.      Nose: Nose normal.      Mouth/Throat:      Mouth: Mucous membranes are moist.   Eyes:      Extraocular Movements:
file   Depression: Not at risk (6/13/2023)    PHQ-2     PHQ-2 Score: 0   Housing Stability: Unknown (3/13/2023)    Housing Stability Vital Sign     Unable to Pay for Housing in the Last Year: Not on file     Number of Places Lived in the Last Year: Not on file     Unstable Housing in the Last Year: No        Health Maintenance Due   Topic Date Due    Hepatitis B vaccine (1 of 3 - 3-dose series) Never done    COVID-19 Vaccine (1) Never done    Pneumococcal 0-64 years Vaccine (1 - PCV) Never done    Diabetic foot exam  Never done    HIV screen  Never done    Hepatitis C screen  Never done    DTaP/Tdap/Td vaccine (1 - Tdap) Never done    Shingles vaccine (1 of 2) Never done    Diabetic retinal exam  03/25/2021   . Coordination of Care:  1. Have you been to the ER, urgent care clinic since your last visit? Hospitalized since your last visit? Yes 8/26/23    2. Have you seen or consulted any other health care providers outside of the [address] [place] [address] Avenue since your last visit? Include any pap smears or colon screening.  Yes, PT with [facility]

## 2023-09-17 ASSESSMENT — ENCOUNTER SYMPTOMS: RESPIRATORY NEGATIVE: 1

## 2023-09-28 NOTE — PROGRESS NOTES
Pharmacy Progress Note - Diabetes Management       Assessment / Plan:   Diabetes Management:  Per ADA guidelines, Pt's A1c is not at goal of < 7%. Pt's FBG values are not indicative of her current A1c level. Her prandial control is unknown, but is likely poor given the A1c value. Will switch her Januvia to Mounjaro 2.5mg weekly with the goal to titrate to max dose for both glycemic control and weight loss. May need to discontinue the glipizide to decrease risk of hypoglycemia as her glycemic control improves with the start of Mounjaro and its titration. Will have her perform staggered SMBG checks and will reassess with these logs in 3 weeks. Hyperlipidemia:  The 10-year ASCVD risk score (Renetta DK, et al., 2019) is: 29.9% based on parameters listed. Current lipid treatment guidelines recommend at least moderate-intensity statin doses for all patients with diabetes to decrease overall ASCVD risk. Patient currently qualifies for a high intensity statin therapy based on current recommendations and is not currently taking a statin. - Will start Crestor 20mg daily     Nutrition/Lifestyle Modifications:  - Educated pt on the importance of moderating carbohydrate intake. Reviewed sources of carbohydrates and method to help determine appropriate portion sizes (e.g., Diabetes Plate Method). - Advised patient to avoid sugar-sweetened beverages and replace with water or diet/zero sugar option.  - Recommend ~30 minutes consistent, moderately intensive, exercise/day or ~150 minutes/week. Start small, stay consistent, and increase length and types of exercise, as tolerated. Patient will return to clinic in 3 week(s) for follow up.         S/O: Ms. Anibal Mckinley, a 47 y.o. female referred by Vitaly Moore MD,  has a past medical history of AR (allergic rhinitis), COVID-19, Eczema, Gestational diabetes, Hypertension, Type 2 diabetes mellitus without complication, without long-term current use of insulin ([address]),

## 2023-10-02 ENCOUNTER — PHARMACY VISIT (OUTPATIENT)
Age: 53
End: 2023-10-02

## 2023-10-02 DIAGNOSIS — E11.9 TYPE 2 DIABETES MELLITUS WITHOUT COMPLICATION, WITHOUT LONG-TERM CURRENT USE OF INSULIN (HCC): Primary | ICD-10-CM

## 2023-10-02 RX ORDER — TIRZEPATIDE 2.5 MG/.5ML
2.5 INJECTION, SOLUTION SUBCUTANEOUS WEEKLY
Qty: 2 ML | Refills: 0 | Status: SHIPPED | OUTPATIENT
Start: 2023-10-02

## 2023-10-02 RX ORDER — ROSUVASTATIN CALCIUM 20 MG/1
20 TABLET, COATED ORAL NIGHTLY
Qty: 90 TABLET | Refills: 3 | Status: SHIPPED | OUTPATIENT
Start: 2023-10-02

## 2023-10-02 NOTE — PATIENT INSTRUCTIONS
Your Visit Summary:     Plan:  - Stop Januvia    - Start Mounjaro 2.5mg weekly    - Check blood glucose at least twice daily     - Start Crestor 20mg every day at bedtime    Call me with any questions or concerns  Marialuisaoxana Rojo [phone]    Check and document your blood sugar first thing in the morning (fasting at least 8 hours), 2 hours after a meal, and/or before bedtime. Bring your meter/log to all future visits. Your blood sugar goals:  - Fasting (first thing in the morning)  blood sugar: 80 - 130mg/dL  - 2 hours after a meal: 80 - 180mg/dL    When you experience symptoms of low blood sugar (example: less than 70):  - Confirm low reading by checking your blood sugar.   - Then treat with 15 grams of carbohydrates (one-half cup of juice or regular soda, or 4-5 glucose tablets). - Wait 15 minutes to recheck blood sugar.   - Then eat a protein containing meal/snack to prevent another low blood sugar episode. (example: peanut butter + crackers)    Nutrition:  - When reviewing a nutrition label, focus on the serving size, total calories, fat (and type of fats), total carbohydrates, sugar (and amount of added sugar), amount of fiber (good for your digestive), and amount of protein. Refer to your nutrition label guide for more information.  - For a meal : max 45 - 60 grams of carbohydrates  - For a snack: max 15 grams of carbohydrates  - Reduce amount of saturated and trans fat. Consider more unsaturated fat options as they are better for your heart health.    - Have at least 1 serving of lean fat protein with each meal.    - Increase fiber intake slowly to prevent constipation.   - Substitute fruit juices for the whole fruit    Low carb snack ideas (15 grams total carb or less):    String cheese or babybel with 6 crackers  4 peanut butter crackers  3 cups of popcorn  1 cup raw vegetables with hummus or ranch dip (just need to watch how much dip you use)  Nuts  2 rice cakes  Celery with peanut butter or

## 2023-10-23 NOTE — PROGRESS NOTES
Pharmacy Progress Note - Diabetes Management       Assessment / Plan:   Diabetes Management:  Per ADA guidelines, Pt's A1c is not at goal of < 7%. Pt's glycemic control is improving with the start of Mounjaro. Will increase the dose of Mounjaro to 5mg weekly to continue the titration with goal 15mg weekly. FreeStyle Libre3 started today. Will reassess with CGM data in 5 weeks. Nutrition/Lifestyle Modifications:  - Educated pt on the importance of moderating carbohydrate intake. Reviewed sources of carbohydrates and method to help determine appropriate portion sizes (e.g., Diabetes Plate Method). - Advised patient to avoid sugar-sweetened beverages and replace with water or diet/zero sugar option.  - Recommend ~30 minutes consistent, moderately intensive, exercise/day or ~150 minutes/week. Start small, stay consistent, and increase length and types of exercise, as tolerated. Patient will return to clinic in 5 week(s) for follow up. S/O: Ms. Dasha Mary, a 47 y.o. female referred by Garcia Michael MD,  has a past medical history of AR (allergic rhinitis), COVID-19, Eczema, Gestational diabetes, Hypertension, Type 2 diabetes mellitus without complication, without long-term current use of insulin ([address]), and Vertigo. Pt was seen today for diabetes management. Patient's last A1c was:   Hemoglobin A1C   Date Value Ref Range Status   09/12/2023 11.1 (H) 4.8 - 5.6 % Final       Interim update: Pt was last seen by me on 10/2/2023. Per my prior note: Pt's A1c is not at goal of < 7%. Pt's FBG values are not indicative of her current A1c level. Her prandial control is unknown, but is likely poor given the A1c value. Will switch her Januvia to Mounjaro 2.5mg weekly with the goal to titrate to max dose for both glycemic control and weight loss. May need to discontinue the glipizide to decrease risk of hypoglycemia as her glycemic control improves with the start of Mounjaro and its titration.

## 2023-10-25 RX ORDER — ALBUTEROL SULFATE 90 UG/1
AEROSOL, METERED RESPIRATORY (INHALATION)
Qty: 18 G | Refills: 12 | Status: SHIPPED | OUTPATIENT
Start: 2023-10-25

## 2023-10-25 NOTE — TELEPHONE ENCOUNTER
Pt called in to get a refill of albuterol sulfate HFA (PROVENTIL;VENTOLIN;PROAIR) 108 (90 Base) MCG/ACT inhaler [[id number]]   Completely out

## 2023-10-27 ENCOUNTER — PHARMACY VISIT (OUTPATIENT)
Age: 53
End: 2023-10-27

## 2023-10-27 DIAGNOSIS — E11.9 TYPE 2 DIABETES MELLITUS WITHOUT COMPLICATION, WITHOUT LONG-TERM CURRENT USE OF INSULIN (HCC): Primary | ICD-10-CM

## 2023-10-27 RX ORDER — TIRZEPATIDE 5 MG/.5ML
5 INJECTION, SOLUTION SUBCUTANEOUS WEEKLY
Qty: 2 ML | Refills: 1 | Status: SHIPPED | OUTPATIENT
Start: 2023-10-27

## 2023-10-27 RX ORDER — BLOOD-GLUCOSE SENSOR
EACH MISCELLANEOUS
Qty: 2 EACH | Refills: 11 | Status: SHIPPED | OUTPATIENT
Start: 2023-10-27

## 2023-11-08 ENCOUNTER — PATIENT MESSAGE (OUTPATIENT)
Age: 53
End: 2023-11-08

## 2023-11-08 DIAGNOSIS — E11.9 TYPE 2 DIABETES MELLITUS WITHOUT COMPLICATION, WITHOUT LONG-TERM CURRENT USE OF INSULIN (HCC): Primary | ICD-10-CM

## 2023-11-08 RX ORDER — ACYCLOVIR 400 MG/1
TABLET ORAL
Qty: 3 EACH | Refills: 11 | Status: SHIPPED | OUTPATIENT
Start: 2023-11-08

## 2023-11-08 NOTE — TELEPHONE ENCOUNTER
Will send in order for the Dexcom G7 sensor to replace the [address]. Will provide training and education if she does not apply it prior to our next visit.     Thank you,    Nicolas Fisher, PharmD, BCACP, BC-[facility]      For Pharmacy Admin Tracking Only    Program: Medical Group  CPA in place:  Yes  Recommendation Provided To: Pharmacy: 2  Intervention Detail: Discontinued Rx: 1, reason: Cost/Formulary Change and New Rx: 1, reason: Cost/Formulary Change  Intervention Accepted By: Pharmacy: 2  Gap Closed?: Yes   Time Spent (min): 15

## 2023-11-08 NOTE — TELEPHONE ENCOUNTER
From: Ruby Covert  To: Padmini Lucero  Sent: 11/8/2023 9:42 AM EST  Subject: Dexcom    Hello,    I spoke with my insurance company. I was told they wouldn't pay for [employer]. They said they would approve the [facility] system.

## 2023-11-28 ENCOUNTER — TELEPHONE (OUTPATIENT)
Age: 53
End: 2023-11-28

## 2023-11-28 NOTE — TELEPHONE ENCOUNTER
----- Message from Governor Ayleen sent at 11/27/2023  1:06 PM EST -----  Regarding: Carmen Keller: [phone]  Did you want me to refill the mounjaro 5 dose or the next doseage If so I would need it on Sunday, December 3rd.

## 2023-12-01 ENCOUNTER — PHARMACY VISIT (OUTPATIENT)
Age: 53
End: 2023-12-01

## 2023-12-01 DIAGNOSIS — E11.9 TYPE 2 DIABETES MELLITUS WITHOUT COMPLICATION, WITHOUT LONG-TERM CURRENT USE OF INSULIN (HCC): Primary | ICD-10-CM

## 2023-12-01 RX ORDER — TIRZEPATIDE 7.5 MG/.5ML
7.5 INJECTION, SOLUTION SUBCUTANEOUS WEEKLY
Qty: 2 ML | Refills: 0 | Status: SHIPPED | OUTPATIENT
Start: 2023-12-01

## 2023-12-01 RX ORDER — TIRZEPATIDE 10 MG/.5ML
10 INJECTION, SOLUTION SUBCUTANEOUS WEEKLY
Qty: 2 ML | Refills: 1 | Status: SHIPPED | OUTPATIENT
Start: 2023-12-18

## 2023-12-06 ENCOUNTER — HOSPITAL ENCOUNTER (OUTPATIENT)
Facility: HOSPITAL | Age: 53
Discharge: HOME | End: 2023-12-09

## 2023-12-06 LAB — SENTARA SPECIMEN COLLECTION: NORMAL

## 2023-12-07 LAB
A/G RATIO: 1.6 RATIO (ref 1.1–2.6)
ALBUMIN SERPL-MCNC: 4.4 G/DL (ref 3.5–5)
ALP BLD-CCNC: 93 U/L (ref 25–115)
ALT SERPL-CCNC: 19 U/L (ref 5–40)
ANION GAP SERPL CALCULATED.3IONS-SCNC: 13 MMOL/L (ref 3–15)
AST SERPL-CCNC: 19 U/L (ref 10–37)
AVERAGE GLUCOSE: 219 MG/DL (ref 91–123)
BILIRUB SERPL-MCNC: 0.2 MG/DL (ref 0.2–1.2)
BUN BLDV-MCNC: 16 MG/DL (ref 6–22)
CALCIUM SERPL-MCNC: 9.6 MG/DL (ref 8.4–10.5)
CHLORIDE BLD-SCNC: 98 MMOL/L (ref 98–110)
CHOLESTEROL/HDL RATIO: 2.6 (ref 0–5)
CHOLESTEROL: 116 MG/DL (ref 110–200)
CO2: 25 MMOL/L (ref 20–32)
CREAT SERPL-MCNC: 1 MG/DL (ref 0.5–1.2)
CREATININE URINE: 75 MG/DL
GLOBULIN: 2.8 G/DL (ref 2–4)
GLOMERULAR FILTRATION RATE: >60 ML/MIN/1.73 SQ.M.
GLUCOSE: 111 MG/DL (ref 70–99)
HBA1C MFR BLD: 9.3 % (ref 4.8–5.6)
HDLC SERPL-MCNC: 44 MG/DL
LDL CHOLESTEROL CALCULATED: 52 MG/DL (ref 50–99)
LDL/HDL RATIO: 1.2
MICROALB/CREAT RATIO (UG/MG CREAT.): 58.7 (ref 0–30)
MICROALBUMIN/CREAT 24H UR: 44 MG/L (ref 0.1–17)
NON-HDL CHOLESTEROL: 72 MG/DL
POTASSIUM SERPL-SCNC: 3.6 MMOL/L (ref 3.5–5.5)
SODIUM BLD-SCNC: 136 MMOL/L (ref 133–145)
TOTAL PROTEIN: 7.2 G/DL (ref 6.4–8.3)
TRIGL SERPL-MCNC: 99 MG/DL (ref 40–149)
VLDLC SERPL CALC-MCNC: 20 MG/DL (ref 8–30)

## 2023-12-12 ENCOUNTER — OFFICE VISIT (OUTPATIENT)
Facility: CLINIC | Age: 53
End: 2023-12-12
Payer: COMMERCIAL

## 2023-12-12 VITALS
WEIGHT: 203 LBS | DIASTOLIC BLOOD PRESSURE: 84 MMHG | HEIGHT: 66 IN | BODY MASS INDEX: 32.62 KG/M2 | TEMPERATURE: 97.8 F | RESPIRATION RATE: 14 BRPM | SYSTOLIC BLOOD PRESSURE: 164 MMHG | OXYGEN SATURATION: 100 % | HEART RATE: 83 BPM

## 2023-12-12 DIAGNOSIS — E11.69 HYPERLIPIDEMIA ASSOCIATED WITH TYPE 2 DIABETES MELLITUS (HCC): ICD-10-CM

## 2023-12-12 DIAGNOSIS — I10 ESSENTIAL (PRIMARY) HYPERTENSION: ICD-10-CM

## 2023-12-12 DIAGNOSIS — E11.65 TYPE 2 DIABETES MELLITUS WITH HYPERGLYCEMIA, WITHOUT LONG-TERM CURRENT USE OF INSULIN (HCC): Primary | ICD-10-CM

## 2023-12-12 DIAGNOSIS — E11.65 TYPE 2 DIABETES MELLITUS WITH HYPERGLYCEMIA, WITHOUT LONG-TERM CURRENT USE OF INSULIN (HCC): ICD-10-CM

## 2023-12-12 DIAGNOSIS — E78.5 HYPERLIPIDEMIA ASSOCIATED WITH TYPE 2 DIABETES MELLITUS (HCC): ICD-10-CM

## 2023-12-12 PROCEDURE — 3079F DIAST BP 80-89 MM HG: CPT | Performed by: FAMILY MEDICINE

## 2023-12-12 PROCEDURE — 3046F HEMOGLOBIN A1C LEVEL >9.0%: CPT | Performed by: FAMILY MEDICINE

## 2023-12-12 PROCEDURE — 3077F SYST BP >= 140 MM HG: CPT | Performed by: FAMILY MEDICINE

## 2023-12-12 PROCEDURE — 99214 OFFICE O/P EST MOD 30 MIN: CPT | Performed by: FAMILY MEDICINE

## 2023-12-12 SDOH — ECONOMIC STABILITY: FOOD INSECURITY: WITHIN THE PAST 12 MONTHS, YOU WORRIED THAT YOUR FOOD WOULD RUN OUT BEFORE YOU GOT MONEY TO BUY MORE.: NEVER TRUE

## 2023-12-12 SDOH — ECONOMIC STABILITY: FOOD INSECURITY: WITHIN THE PAST 12 MONTHS, THE FOOD YOU BOUGHT JUST DIDN'T LAST AND YOU DIDN'T HAVE MONEY TO GET MORE.: NEVER TRUE

## 2023-12-12 SDOH — ECONOMIC STABILITY: INCOME INSECURITY: HOW HARD IS IT FOR YOU TO PAY FOR THE VERY BASICS LIKE FOOD, HOUSING, MEDICAL CARE, AND HEATING?: NOT HARD AT ALL

## 2023-12-12 ASSESSMENT — PATIENT HEALTH QUESTIONNAIRE - PHQ9
SUM OF ALL RESPONSES TO PHQ QUESTIONS 1-9: 0
SUM OF ALL RESPONSES TO PHQ QUESTIONS 1-9: 0
SUM OF ALL RESPONSES TO PHQ9 QUESTIONS 1 & 2: 0
SUM OF ALL RESPONSES TO PHQ QUESTIONS 1-9: 0
2. FEELING DOWN, DEPRESSED OR HOPELESS: 0
1. LITTLE INTEREST OR PLEASURE IN DOING THINGS: 0
SUM OF ALL RESPONSES TO PHQ QUESTIONS 1-9: 0

## 2023-12-12 NOTE — PROGRESS NOTES
Wanda Minor presents today for   Chief Complaint   Patient presents with    Cholesterol Problem    Diabetes    Hypertension    Discuss Labs       Is someone accompanying this pt? no    Is the patient using any DME equipment during OV? no    Depression Screenin/12/2023    11:51 AM 2023    12:04 PM 2023     8:58 AM 3/13/2023    11:31 AM 2022     9:06 AM   PHQ-9 Questionaire   Little interest or pleasure in doing things 0 0 0 0 0   Feeling down, depressed, or hopeless 0 0 0 0 0   PHQ-9 Total Score 0 0 0 0 0        CHRISTOPHER 7-Anxiety        No data to display                 Learning Assessment:  No question data found. Fall Risk       No data to display                   Travel Screening:    Travel Screening       Question Response    Have you been in contact with someone who was sick? No / Unsure    Do you have any of the following new or worsening symptoms? None of these    Have you traveled internationally or domestically in the last month? No          Travel History   Travel since 23    No documented travel since 23          Health Maintenance reviewed and discussed and ordered per Provider. Social Determinants of Health     Tobacco Use: Low Risk  (2023)    Patient History     Smoking Tobacco Use: Never     Smokeless Tobacco Use: Never     Passive Exposure: Never   Alcohol Use: Not on file   Financial Resource Strain: Low Risk  (2023)    Overall Financial Resource Strain (CARDIA)     Difficulty of Paying Living Expenses: Not hard at all   Food Insecurity: No Food Insecurity (2023)    Hunger Vital Sign     Worried About Running Out of Food in the Last Year: Never true     [address] in the Last Year: Never true   Transportation Needs: Unknown (2023)    PRAPARE - Transportation     Lack of Transportation (Medical): Not on file     Lack of Transportation (Non-Medical):  No   Physical Activity: Not on file   Stress: Not on file   Social Connections:
Mouth/Throat:      Mouth: Mucous membranes are moist.   Eyes:      Extraocular Movements: Extraocular movements intact. Conjunctiva/sclera: Conjunctivae normal.   Cardiovascular:      Rate and Rhythm: Normal rate and regular rhythm. Heart sounds: No murmur heard. No friction rub. No gallop. Pulmonary:      Effort: Pulmonary effort is normal.      Breath sounds: Normal breath sounds. No wheezing, rhonchi or rales. Musculoskeletal:         General: Normal range of motion. Cervical back: Normal range of motion. Skin:     General: Skin is warm and dry. Neurological:      Mental Status: She is alert and oriented to person, place, and time. Coordination: Coordination normal.   Psychiatric:         Mood and Affect: Mood normal.         Behavior: Behavior normal.         Thought Content: Thought content normal.         Judgment: Judgment normal.                     An electronic signature was used to authenticate this note.     --Juanis Meehan MD

## 2023-12-17 DIAGNOSIS — I10 ESSENTIAL (PRIMARY) HYPERTENSION: ICD-10-CM

## 2023-12-17 DIAGNOSIS — E11.65 TYPE 2 DIABETES MELLITUS WITH HYPERGLYCEMIA (HCC): ICD-10-CM

## 2023-12-18 RX ORDER — HYDROCHLOROTHIAZIDE 25 MG/1
TABLET ORAL
Qty: 90 TABLET | Refills: 3 | OUTPATIENT
Start: 2023-12-18

## 2024-01-11 NOTE — PROGRESS NOTES
Pharmacy Progress Note - Diabetes Management       Assessment / Plan:   Diabetes Management:  Per ADA guidelines, Pt's A1c is not at goal of < 7%.  Pt's basal control remains poor despite the continued Mounjaro titration combined with her dietary changes.  She is not having pronounced post-prandial elevations with the help of Mounjaro.  Basal insulin would be preferable at this time given her lack of adequate basal control.  However, given pt's denial of this, will continue her dose titration up to 15mg weekly.  Will reassess with CGM data in 6 weeks.  Will revisit insulin discussion if needed at next visit.     Nutrition/Lifestyle Modifications:  - Educated pt on the importance of moderating carbohydrate intake. Reviewed sources of carbohydrates and method to help determine appropriate portion sizes (e.g., Diabetes Plate Method).  - Advised patient to avoid sugar-sweetened beverages and replace with water or diet/zero sugar option.  - Recommend ~30 minutes consistent, moderately intensive, exercise/day or ~150 minutes/week. Start small, stay consistent, and increase length and types of exercise, as tolerated.       Patient will return to clinic in 6 week(s) for follow up.        S/O: Ms. Alem Benites, a 53 y.o. female referred by Stephie Velasco MD,  has a past medical history of AR (allergic rhinitis), COVID-19, Eczema, Gestational diabetes, Hypertension, Type 2 diabetes mellitus without complication, without long-term current use of insulin ([facility]), and Vertigo.  Pt was seen today for diabetes management.  Patient's last A1c was:   Hemoglobin A1C   Date Value Ref Range Status   12/06/2023 9.3 (H) 4.8 - 5.6 % Final       Interim update: Pt was last seen by me on 12/1/2023.  Per my prior note: Pt's A1c is not at goal of < 7%.  Pt's basal control is improved with the start of Mounjaro.  She continues to have prolonged post-prandial elevations.  Will continue her dose titration of Mounjaro up to 15mg weekly to

## 2024-01-15 ENCOUNTER — PHARMACY VISIT (OUTPATIENT)
Age: 54
End: 2024-01-15

## 2024-01-15 DIAGNOSIS — E11.9 TYPE 2 DIABETES MELLITUS WITHOUT COMPLICATION, WITHOUT LONG-TERM CURRENT USE OF INSULIN (HCC): Primary | ICD-10-CM

## 2024-01-15 RX ORDER — TIRZEPATIDE 12.5 MG/.5ML
12.5 INJECTION, SOLUTION SUBCUTANEOUS WEEKLY
Qty: 2 ML | Refills: 0 | Status: SHIPPED | OUTPATIENT
Start: 2024-01-15

## 2024-01-15 RX ORDER — TIRZEPATIDE 15 MG/.5ML
15 INJECTION, SOLUTION SUBCUTANEOUS WEEKLY
Qty: 2 ML | Refills: 11 | Status: SHIPPED | OUTPATIENT
Start: 2024-02-05

## 2024-02-22 NOTE — PROGRESS NOTES
MOUTH EVERY 4 HOURS AS NEEDED FOR WHEEZING    rosuvastatin (CRESTOR) 20 MG tablet Take 1 tablet by mouth nightly    aliskiren (TEKTURNA) 300 MG tablet TAKE 1 TABLET DAILY    meclizine (ANTIVERT) 12.5 MG tablet Take 1 tablet by mouth 3 times daily as needed for Dizziness    ondansetron (ZOFRAN-ODT) 8 MG TBDP disintegrating tablet Take 1 tablet by mouth every 8 hours as needed for Nausea    metFORMIN (GLUCOPHAGE) 1000 MG tablet TAKE 1 TABLET BY MOUTH TWICE A DAY WITH MEALS    hydroCHLOROthiazide (HYDRODIURIL) 25 MG tablet TAKE 1 TABLET BY MOUTH EVERY DAY    triamcinolone (KENALOG) 0.1 % cream Apply topically 2 times daily as needed (rash) Apply topically 2 times daily.    Lancets MISC Use with One Touch Ultra Mini Glucose Meter test blood sugar once daily .00    Multiple Vitamin (MULTI-VITAMIN PO) Take by mouth daily    ascorbic acid (VITAMIN C) 500 MG tablet Take 1 tablet by mouth daily    vitamin D 25 MCG (1000 UT) CAPS Take 1 capsule by mouth daily    cyanocobalamin 1000 MCG tablet Take 1 tablet by mouth daily    naproxen (NAPROSYN) 500 MG tablet TAKE 1 TABLET BY MOUTH TWICE A DAY WITH FOOD    fluticasone (FLONASE) 50 MCG/ACT nasal spray USE 2 SPRAY(S) IN EACH NOSTRIL ONCE DAILY     No current facility-administered medications for this visit.     Allergies:  Allergies   Allergen Reactions    Codeine Nausea Only and Other (See Comments)     Sweats felt like she was going to pass out    Nifedipine Nausea And Vomiting, Other (See Comments) and Shortness Of Breath     Tightness in chest on 7/6/1990    Oxycodone-Acetaminophen Nausea Only and Other (See Comments)     Sweats, felt like she was going to pass out    Sulfa Antibiotics Rash    Acetaminophen-Codeine      Other reaction(s): Unknown (comments)       Blood Glucose Monitoring (BGM) or CGM:        ROS:  Today, Pt endorses:  - Symptoms of Hyperglycemia: none  - Symptoms of Hypoglycemia: none    Lifestyle modification(s):  - as above    Medication

## 2024-02-26 ENCOUNTER — PHARMACY VISIT (OUTPATIENT)
Age: 54
End: 2024-02-26

## 2024-02-26 DIAGNOSIS — E11.9 TYPE 2 DIABETES MELLITUS WITHOUT COMPLICATION, WITHOUT LONG-TERM CURRENT USE OF INSULIN (HCC): Primary | ICD-10-CM

## 2024-03-08 ENCOUNTER — HOSPITAL ENCOUNTER (OUTPATIENT)
Facility: HOSPITAL | Age: 54
Discharge: HOME | End: 2024-03-11

## 2024-03-08 LAB — SENTARA SPECIMEN COLLECTION: NORMAL

## 2024-03-08 PROCEDURE — 99001 SPECIMEN HANDLING PT-LAB: CPT

## 2024-03-09 LAB
A/G RATIO: 1.5 RATIO (ref 1.1–2.6)
ALBUMIN SERPL-MCNC: 4.4 G/DL (ref 3.5–5)
ALP BLD-CCNC: 96 U/L (ref 25–115)
ALT SERPL-CCNC: 16 U/L (ref 5–40)
ANION GAP SERPL CALCULATED.3IONS-SCNC: 13 MMOL/L (ref 3–15)
AST SERPL-CCNC: 17 U/L (ref 10–37)
BILIRUB SERPL-MCNC: 0.2 MG/DL (ref 0.2–1.2)
BUN BLDV-MCNC: 15 MG/DL (ref 6–22)
CALCIUM SERPL-MCNC: 9.8 MG/DL (ref 8.4–10.5)
CHLORIDE BLD-SCNC: 101 MMOL/L (ref 98–110)
CHOLESTEROL/HDL RATIO: 3 (ref 0–5)
CHOLESTEROL: 128 MG/DL (ref 110–200)
CO2: 25 MMOL/L (ref 20–32)
CREAT SERPL-MCNC: 1 MG/DL (ref 0.5–1.2)
CREATININE URINE: 139 MG/DL
ESTIMATED AVERAGE GLUCOSE: 188 MG/DL (ref 91–123)
GLOBULIN: 2.9 G/DL (ref 2–4)
GLOMERULAR FILTRATION RATE: >60 ML/MIN/1.73 SQ.M.
GLUCOSE: 118 MG/DL (ref 70–99)
HBA1C MFR BLD: 8.2 % (ref 4.8–5.6)
HDLC SERPL-MCNC: 42 MG/DL
LDL CHOLESTEROL CALCULATED: 63 MG/DL (ref 50–99)
LDL/HDL RATIO: 1.5
MICROALB/CREAT RATIO (UG/MG CREAT.): 20.9 (ref 0–30)
MICROALBUMIN/CREAT 24H UR: 29 MG/L (ref 0.1–17)
NON-HDL CHOLESTEROL: 86 MG/DL
POTASSIUM SERPL-SCNC: 3.9 MMOL/L (ref 3.5–5.5)
SODIUM BLD-SCNC: 139 MMOL/L (ref 133–145)
TOTAL PROTEIN: 7.3 G/DL (ref 6.4–8.3)
TRIGL SERPL-MCNC: 115 MG/DL (ref 40–149)
VLDLC SERPL CALC-MCNC: 23 MG/DL (ref 8–30)

## 2024-03-10 DIAGNOSIS — I10 ESSENTIAL (PRIMARY) HYPERTENSION: ICD-10-CM

## 2024-03-10 DIAGNOSIS — J30.1 ALLERGIC RHINITIS DUE TO POLLEN: ICD-10-CM

## 2024-03-10 DIAGNOSIS — E11.65 TYPE 2 DIABETES MELLITUS WITH HYPERGLYCEMIA (HCC): ICD-10-CM

## 2024-03-11 ASSESSMENT — ENCOUNTER SYMPTOMS: RESPIRATORY NEGATIVE: 1

## 2024-03-11 NOTE — PROGRESS NOTES
Constitutional: Negative.    HENT: Negative.     Respiratory: Negative.     Cardiovascular: Negative.    All other systems reviewed and are negative.      Physical Exam  Vitals and nursing note reviewed.   Constitutional:       General: She is not in acute distress.     Appearance: Normal appearance.   HENT:      Head: Normocephalic and atraumatic.      Right Ear: External ear normal.      Left Ear: External ear normal.      Nose: Nose normal.      Mouth/Throat:      Mouth: Mucous membranes are moist.   Eyes:      Extraocular Movements: Extraocular movements intact.      Conjunctiva/sclera: Conjunctivae normal.   Cardiovascular:      Rate and Rhythm: Normal rate and regular rhythm.      Heart sounds: No murmur heard.     No friction rub. No gallop.   Pulmonary:      Effort: Pulmonary effort is normal.      Breath sounds: Normal breath sounds. No wheezing, rhonchi or rales.   Musculoskeletal:         General: Normal range of motion.      Cervical back: Normal range of motion.   Skin:     General: Skin is warm and dry.   Neurological:      Mental Status: She is alert and oriented to person, place, and time.      Coordination: Coordination normal.   Psychiatric:         Mood and Affect: Mood normal.         Behavior: Behavior normal.         Thought Content: Thought content normal.         Judgment: Judgment normal.                     An electronic signature was used to authenticate this note.    --Stephie Velasco MD

## 2024-03-12 ENCOUNTER — OFFICE VISIT (OUTPATIENT)
Facility: CLINIC | Age: 54
End: 2024-03-12
Payer: COMMERCIAL

## 2024-03-12 VITALS
OXYGEN SATURATION: 100 % | BODY MASS INDEX: 29.57 KG/M2 | RESPIRATION RATE: 15 BRPM | SYSTOLIC BLOOD PRESSURE: 123 MMHG | WEIGHT: 184 LBS | HEART RATE: 84 BPM | HEIGHT: 66 IN | TEMPERATURE: 97.9 F | DIASTOLIC BLOOD PRESSURE: 79 MMHG

## 2024-03-12 DIAGNOSIS — I10 ESSENTIAL (PRIMARY) HYPERTENSION: ICD-10-CM

## 2024-03-12 DIAGNOSIS — M25.512 CHRONIC LEFT SHOULDER PAIN: ICD-10-CM

## 2024-03-12 DIAGNOSIS — G89.29 CHRONIC LEFT SHOULDER PAIN: ICD-10-CM

## 2024-03-12 DIAGNOSIS — J30.1 SEASONAL ALLERGIC RHINITIS DUE TO POLLEN: ICD-10-CM

## 2024-03-12 DIAGNOSIS — E11.65 TYPE 2 DIABETES MELLITUS WITH HYPERGLYCEMIA, WITHOUT LONG-TERM CURRENT USE OF INSULIN (HCC): Primary | ICD-10-CM

## 2024-03-12 DIAGNOSIS — E11.69 HYPERLIPIDEMIA ASSOCIATED WITH TYPE 2 DIABETES MELLITUS (HCC): ICD-10-CM

## 2024-03-12 DIAGNOSIS — E78.5 HYPERLIPIDEMIA ASSOCIATED WITH TYPE 2 DIABETES MELLITUS (HCC): ICD-10-CM

## 2024-03-12 PROCEDURE — 99214 OFFICE O/P EST MOD 30 MIN: CPT | Performed by: FAMILY MEDICINE

## 2024-03-12 PROCEDURE — 3052F HG A1C>EQUAL 8.0%<EQUAL 9.0%: CPT | Performed by: FAMILY MEDICINE

## 2024-03-12 PROCEDURE — 3074F SYST BP LT 130 MM HG: CPT | Performed by: FAMILY MEDICINE

## 2024-03-12 PROCEDURE — 3078F DIAST BP <80 MM HG: CPT | Performed by: FAMILY MEDICINE

## 2024-03-12 RX ORDER — FLUTICASONE PROPIONATE 50 MCG
2 SPRAY, SUSPENSION (ML) NASAL DAILY
Qty: 3 EACH | Refills: 4 | Status: SHIPPED | OUTPATIENT
Start: 2024-03-12

## 2024-03-12 RX ORDER — NAPROXEN 500 MG/1
500 TABLET ORAL 2 TIMES DAILY WITH MEALS
Qty: 60 TABLET | Refills: 12 | Status: SHIPPED | OUTPATIENT
Start: 2024-03-12

## 2024-03-12 RX ORDER — HYDROCHLOROTHIAZIDE 25 MG/1
TABLET ORAL
Qty: 90 TABLET | Refills: 3 | Status: SHIPPED | OUTPATIENT
Start: 2024-03-12

## 2024-03-12 SDOH — ECONOMIC STABILITY: INCOME INSECURITY: HOW HARD IS IT FOR YOU TO PAY FOR THE VERY BASICS LIKE FOOD, HOUSING, MEDICAL CARE, AND HEATING?: NOT HARD AT ALL

## 2024-03-12 SDOH — ECONOMIC STABILITY: FOOD INSECURITY: WITHIN THE PAST 12 MONTHS, THE FOOD YOU BOUGHT JUST DIDN'T LAST AND YOU DIDN'T HAVE MONEY TO GET MORE.: NEVER TRUE

## 2024-03-12 SDOH — ECONOMIC STABILITY: FOOD INSECURITY: WITHIN THE PAST 12 MONTHS, YOU WORRIED THAT YOUR FOOD WOULD RUN OUT BEFORE YOU GOT MONEY TO BUY MORE.: NEVER TRUE

## 2024-03-12 ASSESSMENT — PATIENT HEALTH QUESTIONNAIRE - PHQ9
SUM OF ALL RESPONSES TO PHQ QUESTIONS 1-9: 0
SUM OF ALL RESPONSES TO PHQ QUESTIONS 1-9: 0
2. FEELING DOWN, DEPRESSED OR HOPELESS: 0
SUM OF ALL RESPONSES TO PHQ QUESTIONS 1-9: 0
SUM OF ALL RESPONSES TO PHQ9 QUESTIONS 1 & 2: 0
SUM OF ALL RESPONSES TO PHQ QUESTIONS 1-9: 0
1. LITTLE INTEREST OR PLEASURE IN DOING THINGS: 0

## 2024-03-12 NOTE — PROGRESS NOTES
patient requested them? N/a      Health Maintenance Due   Topic Date Due    Hepatitis B vaccine (1 of 3 - 3-dose series) Never done    COVID-19 Vaccine (1) Never done    Pneumococcal 0-64 years Vaccine (1 - PCV) Never done    Diabetic foot exam  Never done    HIV screen  Never done    Hepatitis C screen  Never done    DTaP/Tdap/Td vaccine (1 - Tdap) Never done    Shingles vaccine (1 of 2) Never done    Diabetic retinal exam  03/25/2021    Breast cancer screen  03/21/2024   .      \"Have you been to the ER, urgent care clinic since your last visit?  Hospitalized since your last visit?\"    NO    “Have you seen or consulted any other health care providers outside of [facility] since your last visit?”    NO

## 2024-03-17 PROBLEM — E78.5 HYPERLIPIDEMIA ASSOCIATED WITH TYPE 2 DIABETES MELLITUS (HCC): Chronic | Status: ACTIVE | Noted: 2022-01-25

## 2024-03-17 PROBLEM — E11.69 HYPERLIPIDEMIA ASSOCIATED WITH TYPE 2 DIABETES MELLITUS (HCC): Chronic | Status: ACTIVE | Noted: 2022-01-25

## 2024-03-21 DIAGNOSIS — L30.9 DERMATITIS: ICD-10-CM

## 2024-03-21 RX ORDER — TRIAMCINOLONE ACETONIDE 1 MG/G
CREAM TOPICAL
Qty: 30 G | Refills: 5 | Status: SHIPPED | OUTPATIENT
Start: 2024-03-21

## 2024-03-21 NOTE — TELEPHONE ENCOUNTER
Last seen 3/12/2024   Last labs 03/08/24  Last filled  3/13/23  Next appointment 6/12/2024     Lab Results   Component Value Date     03/08/2024    K 3.9 03/08/2024     03/08/2024    CO2 25 03/08/2024    BUN 15 03/08/2024    CREATININE 1.0 03/08/2024    GLUCOSE 118 (H) 03/08/2024    CALCIUM 9.8 03/08/2024    PROT 7.3 03/08/2024    LABALBU 4.4 03/08/2024    BILITOT 0.2 03/08/2024    ALKPHOS 96 03/08/2024    AST 17 03/08/2024    ALT 16 03/08/2024    LABGLOM 50.1 (L) 01/20/2022    GFRAA >60 01/23/2022    AGRATIO 1.5 03/08/2024    GLOB 2.9 03/08/2024

## 2024-04-03 NOTE — PROGRESS NOTES
rosuvastatin (CRESTOR) 20 MG tablet Take 1 tablet by mouth nightly    aliskiren (TEKTURNA) 300 MG tablet TAKE 1 TABLET DAILY    meclizine (ANTIVERT) 12.5 MG tablet Take 1 tablet by mouth 3 times daily as needed for Dizziness    ondansetron (ZOFRAN-ODT) 8 MG TBDP disintegrating tablet Take 1 tablet by mouth every 8 hours as needed for Nausea    Lancets MISC Use with One Touch Ultra Mini Glucose Meter test blood sugar once daily .00    Multiple Vitamin (MULTI-VITAMIN PO) Take by mouth daily    ascorbic acid (VITAMIN C) 500 MG tablet Take 1 tablet by mouth daily    vitamin D 25 MCG (1000 UT) CAPS Take 1 capsule by mouth daily    cyanocobalamin 1000 MCG tablet Take 1 tablet by mouth daily     No current facility-administered medications for this visit.     Allergies:  Allergies   Allergen Reactions    Codeine Nausea Only and Other (See Comments)     Sweats felt like she was going to pass out    Nifedipine Nausea And Vomiting, Other (See Comments) and Shortness Of Breath     Tightness in chest on 7/6/1990    Oxycodone-Acetaminophen Nausea Only and Other (See Comments)     Sweats, felt like she was going to pass out    Sulfa Antibiotics Rash    Acetaminophen-Codeine      Other reaction(s): Unknown (comments)       Blood Glucose Monitoring (BGM) or CGM:        ROS:  Today, Pt endorses:  - Symptoms of Hyperglycemia: none  - Symptoms of Hypoglycemia: none    Lifestyle modification(s):  - none    Medication Adherence/Access:  - Endorses adherence to current regimen?: Yes    Vitals/Labs:  No results found for: \"HBA1C\"  Hemoglobin A1C   Date Value Ref Range Status   03/08/2024 8.2 (H) 4.8 - 5.6 % Final   12/06/2023 9.3 (H) 4.8 - 5.6 % Final   09/12/2023 11.1 (H) 4.8 - 5.6 % Final       Screenings/Prevention Parameters:  -Diabetic Eye and Foot Exams:      Diabetes Management   Topic Date Due    Diabetic foot exam  Never done    Diabetic retinal exam  03/25/2021     -Microalbumin / Creatinine ratio:       Lab Results

## 2024-04-08 ENCOUNTER — PHARMACY VISIT (OUTPATIENT)
Age: 54
End: 2024-04-08

## 2024-04-08 DIAGNOSIS — E11.9 TYPE 2 DIABETES MELLITUS WITHOUT COMPLICATION, WITHOUT LONG-TERM CURRENT USE OF INSULIN (HCC): Primary | ICD-10-CM

## 2024-05-21 ENCOUNTER — PATIENT MESSAGE (OUTPATIENT)
Age: 54
End: 2024-05-21

## 2024-05-21 DIAGNOSIS — E11.9 TYPE 2 DIABETES MELLITUS WITHOUT COMPLICATION, WITHOUT LONG-TERM CURRENT USE OF INSULIN (HCC): Primary | ICD-10-CM

## 2024-05-21 RX ORDER — TIRZEPATIDE 15 MG/.5ML
15 INJECTION, SOLUTION SUBCUTANEOUS WEEKLY
Qty: 6 ML | Refills: 3 | Status: SHIPPED | OUTPATIENT
Start: 2024-05-21

## 2024-05-21 NOTE — TELEPHONE ENCOUNTER
Martina Franco LPN 5/21/2024 12:15 PM EDT      ----- Message -----  From: Alem Benites  Sent: 5/21/2024 11:26 AM EDT  To: [facility] Clinical Staff  Subject: Mounjaro 15mg pen     I have had the hardest time getting this medicine for each refill. Can this med be sent as a 3 month supply thru [facility]?

## 2024-05-21 NOTE — TELEPHONE ENCOUNTER
Will send Mounjaro 15mg weekly pen 3 month supply to [facility] per pt request.    Thank you,    Ayden Chiu, PharmD, BCACP, BC-ADM      For Pharmacy Admin Tracking Only    Program: Medical Group  CPA in place:  Yes  Recommendation Provided To: Pharmacy: 1  Intervention Detail: Refill(s) Provided  Intervention Accepted By: Pharmacy: 1  Gap Closed?: Yes   Time Spent (min): 5

## 2024-06-05 ENCOUNTER — HOSPITAL ENCOUNTER (OUTPATIENT)
Facility: HOSPITAL | Age: 54
Discharge: HOME | End: 2024-06-08

## 2024-06-05 LAB — SENTARA SPECIMEN COLLECTION: NORMAL

## 2024-06-05 PROCEDURE — 99001 SPECIMEN HANDLING PT-LAB: CPT

## 2024-06-06 LAB
A/G RATIO: 1.4 RATIO (ref 1.1–2.6)
ALBUMIN: 4.4 G/DL (ref 3.5–5)
ALP BLD-CCNC: 90 U/L (ref 25–115)
ALT SERPL-CCNC: 13 U/L (ref 5–40)
ANION GAP SERPL CALCULATED.3IONS-SCNC: 13 MMOL/L (ref 3–15)
AST SERPL-CCNC: 13 U/L (ref 10–37)
BILIRUB SERPL-MCNC: 0.2 MG/DL (ref 0.2–1.2)
BUN BLDV-MCNC: 16 MG/DL (ref 6–22)
CALCIUM SERPL-MCNC: 10 MG/DL (ref 8.4–10.5)
CHLORIDE BLD-SCNC: 98 MMOL/L (ref 98–110)
CHOLESTEROL, TOTAL: 125 MG/DL (ref 110–200)
CHOLESTEROL/HDL RATIO: 2.7 (ref 0–5)
CO2: 25 MMOL/L (ref 20–32)
CREAT SERPL-MCNC: 0.8 MG/DL (ref 0.5–1.2)
CREATININE URINE: 42 MG/DL
ESTIMATED AVERAGE GLUCOSE: 154 MG/DL (ref 91–123)
GFR, ESTIMATED: >60 ML/MIN/1.73 SQ.M.
GLOBULIN: 3.1 G/DL (ref 2–4)
GLUCOSE: 84 MG/DL (ref 70–99)
HBA1C MFR BLD HPLC: 7 %
HBA1C MFR BLD: 7 % (ref 4.8–5.6)
HDLC SERPL-MCNC: 47 MG/DL
LDL CHOLESTEROL: 61 MG/DL (ref 50–99)
LDL/HDL RATIO: 1.3
MICROALB/CREAT RATIO (UG/MG CREAT.): NORMAL
MICROALBUMIN/CREAT 24H UR: <12 MG/L (ref 0.1–17)
NON-HDL CHOLESTEROL: 78 MG/DL
POTASSIUM SERPL-SCNC: 3.6 MMOL/L (ref 3.5–5.5)
SODIUM BLD-SCNC: 136 MMOL/L (ref 133–145)
TOTAL PROTEIN: 7.5 G/DL (ref 6.4–8.3)
TRIGL SERPL-MCNC: 85 MG/DL (ref 40–149)
VLDLC SERPL CALC-MCNC: 17 MG/DL (ref 8–30)

## 2024-06-06 NOTE — PROGRESS NOTES
ASSESSMENT/PLAN:  1. Type 2 diabetes mellitus without complication, without long-term current use of insulin (HCC)  -     MOUNJARO 15 MG/0.5ML SOPN SC injection; Inject 0.5 mLs into the skin once a week, Disp-6 mL, R-3, DAWNormal  2. Hyperlipidemia associated with type 2 diabetes mellitus (HCC)  Assessment & Plan:   Well-controlled, continue current medications  3. Essential (primary) hypertension  Assessment & Plan:   Well-controlled, continue current medications  4. Seasonal allergic rhinitis due to pollen  Assessment & Plan:   Well-controlled, continue current medications        Return in about 4 months (around 10/7/2024) for DM, HTN, HLD, lab review.      SUBJECTIVE/OBJECTIVE:    Chief Complaint   Patient presents with    Hypertension    Cholesterol Problem    Diabetes    Allergic Rhinitis     Discuss Labs         HPI    Alem Benites is a 53 y.o. female presenting today for    3 months  follow up of dm, htn, hld, AR.    Patient had labs on 6/5/24.  Labs reviewed in detail with patient       Patient does  need medication refills today.      New concerns today: none        Review of Systems   Constitutional: Negative.    HENT: Negative.     Respiratory: Negative.     Cardiovascular: Negative.    All other systems reviewed and are negative.      Physical Exam  Vitals and nursing note reviewed.   Constitutional:       General: She is not in acute distress.     Appearance: Normal appearance.   HENT:      Head: Normocephalic and atraumatic.      Right Ear: External ear normal.      Left Ear: External ear normal.      Nose: Nose normal.      Mouth/Throat:      Mouth: Mucous membranes are moist.   Eyes:      Extraocular Movements: Extraocular movements intact.      Conjunctiva/sclera: Conjunctivae normal.   Cardiovascular:      Rate and Rhythm: Normal rate and regular rhythm.      Heart sounds: No murmur heard.     No friction rub. No gallop.   Pulmonary:      Effort: Pulmonary effort is normal.      Breath sounds:

## 2024-06-07 ENCOUNTER — OFFICE VISIT (OUTPATIENT)
Facility: CLINIC | Age: 54
End: 2024-06-07
Payer: COMMERCIAL

## 2024-06-07 ENCOUNTER — TELEPHONE (OUTPATIENT)
Facility: CLINIC | Age: 54
End: 2024-06-07

## 2024-06-07 VITALS
BODY MASS INDEX: 26.84 KG/M2 | DIASTOLIC BLOOD PRESSURE: 77 MMHG | WEIGHT: 167 LBS | SYSTOLIC BLOOD PRESSURE: 129 MMHG | HEIGHT: 66 IN | TEMPERATURE: 97.8 F | OXYGEN SATURATION: 100 % | HEART RATE: 68 BPM | RESPIRATION RATE: 12 BRPM

## 2024-06-07 DIAGNOSIS — E11.9 TYPE 2 DIABETES MELLITUS WITHOUT COMPLICATION, WITHOUT LONG-TERM CURRENT USE OF INSULIN (HCC): Primary | ICD-10-CM

## 2024-06-07 DIAGNOSIS — E11.69 HYPERLIPIDEMIA ASSOCIATED WITH TYPE 2 DIABETES MELLITUS (HCC): ICD-10-CM

## 2024-06-07 DIAGNOSIS — I10 ESSENTIAL (PRIMARY) HYPERTENSION: ICD-10-CM

## 2024-06-07 DIAGNOSIS — J30.1 SEASONAL ALLERGIC RHINITIS DUE TO POLLEN: ICD-10-CM

## 2024-06-07 DIAGNOSIS — E78.5 HYPERLIPIDEMIA ASSOCIATED WITH TYPE 2 DIABETES MELLITUS (HCC): ICD-10-CM

## 2024-06-07 PROCEDURE — 3074F SYST BP LT 130 MM HG: CPT | Performed by: FAMILY MEDICINE

## 2024-06-07 PROCEDURE — 3051F HG A1C>EQUAL 7.0%<8.0%: CPT | Performed by: FAMILY MEDICINE

## 2024-06-07 PROCEDURE — 3078F DIAST BP <80 MM HG: CPT | Performed by: FAMILY MEDICINE

## 2024-06-07 PROCEDURE — 99214 OFFICE O/P EST MOD 30 MIN: CPT | Performed by: FAMILY MEDICINE

## 2024-06-07 RX ORDER — TIRZEPATIDE 15 MG/.5ML
15 INJECTION, SOLUTION SUBCUTANEOUS WEEKLY
Qty: 6 ML | Refills: 3 | Status: SHIPPED | OUTPATIENT
Start: 2024-06-07

## 2024-06-07 SDOH — ECONOMIC STABILITY: FOOD INSECURITY: WITHIN THE PAST 12 MONTHS, THE FOOD YOU BOUGHT JUST DIDN'T LAST AND YOU DIDN'T HAVE MONEY TO GET MORE.: NEVER TRUE

## 2024-06-07 SDOH — ECONOMIC STABILITY: FOOD INSECURITY: WITHIN THE PAST 12 MONTHS, YOU WORRIED THAT YOUR FOOD WOULD RUN OUT BEFORE YOU GOT MONEY TO BUY MORE.: NEVER TRUE

## 2024-06-07 SDOH — ECONOMIC STABILITY: INCOME INSECURITY: HOW HARD IS IT FOR YOU TO PAY FOR THE VERY BASICS LIKE FOOD, HOUSING, MEDICAL CARE, AND HEATING?: NOT HARD AT ALL

## 2024-06-07 ASSESSMENT — PATIENT HEALTH QUESTIONNAIRE - PHQ9
SUM OF ALL RESPONSES TO PHQ9 QUESTIONS 1 & 2: 0
SUM OF ALL RESPONSES TO PHQ QUESTIONS 1-9: 0
SUM OF ALL RESPONSES TO PHQ QUESTIONS 1-9: 0
2. FEELING DOWN, DEPRESSED OR HOPELESS: NOT AT ALL
SUM OF ALL RESPONSES TO PHQ QUESTIONS 1-9: 0
SUM OF ALL RESPONSES TO PHQ QUESTIONS 1-9: 0

## 2024-06-07 NOTE — TELEPHONE ENCOUNTER
Patient stated the pharmacy just informed her that it is to soon to get her Mounjaro. Patient stated she needs to get it before she leaves on the 12th. Patient requesting a return call.

## 2024-06-11 NOTE — TELEPHONE ENCOUNTER
Spoke with patient. She states that her Mounjaro is too early to fill. She states that she will be returning to the pharmacy to see If they can run it back. Patient was made aware that insurance may not pay for medication early, patient states that she will try pharmacy again tomorrow.

## 2024-07-16 RX ORDER — ALISKIREN 300 MG/1
TABLET, FILM COATED ORAL
Qty: 90 TABLET | Refills: 0 | Status: SHIPPED | OUTPATIENT
Start: 2024-07-16

## 2024-07-16 NOTE — TELEPHONE ENCOUNTER
Last seen 6/7/2024   Last labs   Last filled  6/29/23  Next appointment 9/9/2024     Lab Results   Component Value Date     06/05/2024    K 3.6 06/05/2024    CL 98 06/05/2024    CO2 25 06/05/2024    BUN 16 06/05/2024    CREATININE 0.8 06/05/2024    GLUCOSE 84 06/05/2024    CALCIUM 10.0 06/05/2024    BILITOT 0.2 06/05/2024    ALKPHOS 90 06/05/2024    AST 13 06/05/2024    ALT 13 06/05/2024    LABGLOM >60.0 06/05/2024    GFRAA >60 01/23/2022    AGRATIO 1.4 06/05/2024    GLOB 3.1 06/05/2024

## 2024-07-17 NOTE — PROGRESS NOTES
Pharmacy Progress Note - Diabetes Management       Assessment / Plan:   Diabetes Management:  Per ADA guidelines, Pt's A1c is not at goal of < 7%, but should be at goal with her next A1c draw.  Her AGP report from past 90 days shows time in target range 92%, average glucose 130 mg/dL, GMI 6.4%.  This timeframe includes the span of time while off of the Mounjaro as well.  Given her occasional hypoglycemia, will decrease her metformin to 500mg bid.  If she continues to have hypoglycemia after at least 2 weeks at the lower dose, will have her stop the metformin completely.  May need to decrease her dose of Mounjaro if the hypoglycemia continues.  Will reassess with CGM data in 3 months.     Patient will return to clinic in 3 month(s) for follow up.        S/O: Ms. Alem Benites, a 53 y.o. female referred by Stephie Velasco MD,  has a past medical history of AR (allergic rhinitis), COVID-19, Eczema, Gestational diabetes, Hypertension, Type 2 diabetes mellitus without complication, without long-term current use of insulin ([facility]), and Vertigo.  Pt was seen today for diabetes management.  Patient's last A1c was:   Hemoglobin A1C   Date Value Ref Range Status   06/05/2024 7.0 (H) 4.8 - 5.6 % Final       Interim update: Pt was last seen by me on 4/8/2024.  Per my prior note: Pt's A1c is not at goal of < 7%.  Pt's A1c drawn while her glycemic control was in the process of improvement.  Her AGP report from past 30 days shows time in target range 97%, average glucose 132 mg/dL, GMI 6.5%.  Her UACR has improved since the last check.  She would still benefit from an SGLT2i, but unable to tolerate in the past.  Encouraged to continue to improve her diet.  Will reassess with CGM data in 3 months.    Today:   She has been able to continue to obtain the Mounjaro.  Pt states that she has had some hypoglycemia sx of being nauseous when she gets into the 70s.  She is checking against her glucometer and it is within 10 mg/dL of the

## 2024-07-22 ENCOUNTER — PHARMACY VISIT (OUTPATIENT)
Facility: CLINIC | Age: 54
End: 2024-07-22

## 2024-07-22 DIAGNOSIS — E11.9 TYPE 2 DIABETES MELLITUS WITHOUT COMPLICATION, WITHOUT LONG-TERM CURRENT USE OF INSULIN (HCC): Primary | ICD-10-CM

## 2024-07-26 RX ORDER — ROSUVASTATIN CALCIUM 20 MG/1
20 TABLET, COATED ORAL NIGHTLY
Qty: 90 TABLET | Refills: 3 | Status: SHIPPED | OUTPATIENT
Start: 2024-07-26

## 2024-08-05 ENCOUNTER — PATIENT MESSAGE (OUTPATIENT)
Facility: CLINIC | Age: 54
End: 2024-08-05

## 2024-08-05 NOTE — TELEPHONE ENCOUNTER
From: Alem Benites  To: Ayden Chiu  Sent: 8/5/2024 1:27 PM EDT  Subject: Discontinuing Metformin     Hi, it has been 2 weeks with me taking the 500 mg Metformin. Looking at my glucose readings, do you feel I should discontinue this med or continue taking it.

## 2024-08-05 NOTE — TELEPHONE ENCOUNTER
Will discontinue metformin d/t instances of hypoglycemia upon review of CGM data from the last 2 weeks.    Thank you,    Ayden Chiu, PharmD, BCACP, [facility]    For Pharmacy Admin Tracking Only    Program: Medical Group  CPA in place:  Yes  Recommendation Provided To: Patient/Caregiver: 1 via [employer] Message  Intervention Detail: Discontinued Rx: 1, reason: Therapy De-escalation  Intervention Accepted By: Patient/Caregiver: 1  Gap Closed?: Yes   Time Spent (min): 10

## 2024-09-06 ENCOUNTER — HOSPITAL ENCOUNTER (OUTPATIENT)
Facility: HOSPITAL | Age: 54
Discharge: HOME | End: 2024-09-09

## 2024-09-06 LAB — SENTARA SPECIMEN COLLECTION: NORMAL

## 2024-09-06 PROCEDURE — 99001 SPECIMEN HANDLING PT-LAB: CPT

## 2024-09-07 LAB
A/G RATIO: 1.6 RATIO (ref 1.1–2.6)
ALBUMIN: 4.7 G/DL (ref 3.5–5)
ALP BLD-CCNC: 104 U/L (ref 25–115)
ALT SERPL-CCNC: 60 U/L (ref 5–40)
ANION GAP SERPL CALCULATED.3IONS-SCNC: 15 MMOL/L (ref 3–15)
AST SERPL-CCNC: 71 U/L (ref 10–37)
BILIRUB SERPL-MCNC: 0.3 MG/DL (ref 0.2–1.2)
BUN BLDV-MCNC: 21 MG/DL (ref 6–22)
CALCIUM SERPL-MCNC: 10.2 MG/DL (ref 8.4–10.5)
CHLORIDE BLD-SCNC: 99 MMOL/L (ref 98–110)
CHOLESTEROL, TOTAL: 141 MG/DL (ref 110–200)
CHOLESTEROL/HDL RATIO: 2.4 (ref 0–5)
CO2: 23 MMOL/L (ref 20–32)
CREAT SERPL-MCNC: 1.1 MG/DL (ref 0.5–1.2)
CREATININE URINE: 134 MG/DL
ESTIMATED AVERAGE GLUCOSE: 147 MG/DL (ref 91–123)
GFR, ESTIMATED: 57.1 ML/MIN/1.73 SQ.M.
GLOBULIN: 3 G/DL (ref 2–4)
GLUCOSE: 102 MG/DL (ref 70–99)
HBA1C MFR BLD: 6.8 % (ref 4.8–5.6)
HDLC SERPL-MCNC: 60 MG/DL
LDL CHOLESTEROL: 71 MG/DL (ref 50–99)
LDL/HDL RATIO: 1.2
MICROALB/CREAT RATIO (UG/MG CREAT.): NORMAL
MICROALBUMIN/CREAT 24H UR: <12 MG/L (ref 0.1–17)
NON-HDL CHOLESTEROL: 81 MG/DL
POTASSIUM SERPL-SCNC: 4.4 MMOL/L (ref 3.5–5.5)
SODIUM BLD-SCNC: 137 MMOL/L (ref 133–145)
TOTAL PROTEIN: 7.7 G/DL (ref 6.4–8.3)
TRIGL SERPL-MCNC: 48 MG/DL (ref 40–149)
VLDLC SERPL CALC-MCNC: 10 MG/DL (ref 8–30)

## 2024-09-09 ENCOUNTER — OFFICE VISIT (OUTPATIENT)
Facility: CLINIC | Age: 54
End: 2024-09-09
Payer: COMMERCIAL

## 2024-09-09 VITALS
DIASTOLIC BLOOD PRESSURE: 78 MMHG | SYSTOLIC BLOOD PRESSURE: 151 MMHG | OXYGEN SATURATION: 97 % | TEMPERATURE: 97.7 F | HEART RATE: 51 BPM | HEIGHT: 66 IN | BODY MASS INDEX: 25.71 KG/M2 | WEIGHT: 160 LBS

## 2024-09-09 DIAGNOSIS — E11.9 TYPE 2 DIABETES MELLITUS WITHOUT COMPLICATION, WITHOUT LONG-TERM CURRENT USE OF INSULIN (HCC): Primary | ICD-10-CM

## 2024-09-09 DIAGNOSIS — E11.69 HYPERLIPIDEMIA ASSOCIATED WITH TYPE 2 DIABETES MELLITUS (HCC): ICD-10-CM

## 2024-09-09 DIAGNOSIS — E11.9 TYPE 2 DIABETES MELLITUS WITHOUT COMPLICATION, WITHOUT LONG-TERM CURRENT USE OF INSULIN (HCC): ICD-10-CM

## 2024-09-09 DIAGNOSIS — E78.5 HYPERLIPIDEMIA ASSOCIATED WITH TYPE 2 DIABETES MELLITUS (HCC): ICD-10-CM

## 2024-09-09 DIAGNOSIS — I10 ESSENTIAL (PRIMARY) HYPERTENSION: ICD-10-CM

## 2024-09-09 PROCEDURE — 3078F DIAST BP <80 MM HG: CPT | Performed by: FAMILY MEDICINE

## 2024-09-09 PROCEDURE — 3044F HG A1C LEVEL LT 7.0%: CPT | Performed by: FAMILY MEDICINE

## 2024-09-09 PROCEDURE — 99214 OFFICE O/P EST MOD 30 MIN: CPT | Performed by: FAMILY MEDICINE

## 2024-09-09 PROCEDURE — 3077F SYST BP >= 140 MM HG: CPT | Performed by: FAMILY MEDICINE

## 2024-09-09 SDOH — ECONOMIC STABILITY: FOOD INSECURITY: WITHIN THE PAST 12 MONTHS, THE FOOD YOU BOUGHT JUST DIDN'T LAST AND YOU DIDN'T HAVE MONEY TO GET MORE.: NEVER TRUE

## 2024-09-09 SDOH — ECONOMIC STABILITY: INCOME INSECURITY: HOW HARD IS IT FOR YOU TO PAY FOR THE VERY BASICS LIKE FOOD, HOUSING, MEDICAL CARE, AND HEATING?: NOT HARD AT ALL

## 2024-09-09 SDOH — ECONOMIC STABILITY: FOOD INSECURITY: WITHIN THE PAST 12 MONTHS, YOU WORRIED THAT YOUR FOOD WOULD RUN OUT BEFORE YOU GOT MONEY TO BUY MORE.: NEVER TRUE

## 2024-09-09 ASSESSMENT — PATIENT HEALTH QUESTIONNAIRE - PHQ9
SUM OF ALL RESPONSES TO PHQ QUESTIONS 1-9: 0
SUM OF ALL RESPONSES TO PHQ9 QUESTIONS 1 & 2: 0
SUM OF ALL RESPONSES TO PHQ QUESTIONS 1-9: 0
1. LITTLE INTEREST OR PLEASURE IN DOING THINGS: NOT AT ALL
SUM OF ALL RESPONSES TO PHQ QUESTIONS 1-9: 0
SUM OF ALL RESPONSES TO PHQ QUESTIONS 1-9: 0
2. FEELING DOWN, DEPRESSED OR HOPELESS: NOT AT ALL

## 2024-09-09 ASSESSMENT — ENCOUNTER SYMPTOMS: RESPIRATORY NEGATIVE: 1

## 2024-09-14 DIAGNOSIS — E11.9 TYPE 2 DIABETES MELLITUS WITHOUT COMPLICATION, WITHOUT LONG-TERM CURRENT USE OF INSULIN (HCC): ICD-10-CM

## 2024-09-17 DIAGNOSIS — E11.9 TYPE 2 DIABETES MELLITUS WITHOUT COMPLICATION, WITHOUT LONG-TERM CURRENT USE OF INSULIN (HCC): ICD-10-CM

## 2024-09-17 RX ORDER — TIRZEPATIDE 15 MG/.5ML
15 INJECTION, SOLUTION SUBCUTANEOUS WEEKLY
Qty: 6 ML | Refills: 12 | Status: SHIPPED | OUTPATIENT
Start: 2024-09-17

## 2024-10-02 RX ORDER — ALISKIREN 300 MG/1
TABLET, FILM COATED ORAL
Qty: 90 TABLET | Refills: 4 | Status: SHIPPED | OUTPATIENT
Start: 2024-10-02

## 2024-10-02 NOTE — TELEPHONE ENCOUNTER
Lab Results   Component Value Date    LDL 71 09/06/2024       Labs:   Lab Results   Component Value Date     09/06/2024    K 4.4 09/06/2024    CL 99 09/06/2024    CO2 23 09/06/2024    BUN 21 09/06/2024    CREATININE 1.1 09/06/2024    GLUCOSE 102 (H) 09/06/2024    CALCIUM 10.2 09/06/2024    BILITOT 0.3 09/06/2024    ALKPHOS 104 09/06/2024    AST 71 (H) 09/06/2024    ALT 60 (H) 09/06/2024    LABGLOM 57.1 (L) 09/06/2024    GFRAA >60 01/23/2022    AGRATIO 1.6 09/06/2024    GLOB 3.0 09/06/2024          Additional Notes:

## 2024-10-14 RX ORDER — ACYCLOVIR 400 MG/1
TABLET ORAL
Qty: 3 EACH | Refills: 11 | Status: SHIPPED | OUTPATIENT
Start: 2024-10-14

## 2024-10-16 NOTE — PROGRESS NOTES
Pharmacy Progress Note - Diabetes Management       Assessment / Plan:   Diabetes Management:  Per ADA guidelines, Pt's A1c is at goal of < 7%.  Pt's AGP report from the past 30 days shows time in target range 99%, average glucose 114 mg/dL, and GMI 6%.  Pt's glycemic control has been maintained without hypoglycemia following the discontinuation of metformin.  Encouraged to maintain her diet and avoid candy.  Will maintain her current tx and will reassess with CGM data in 3 months.    Hyperlipidemia:  The 10-year ASCVD risk score (Renetta DK, et al., 2019) is: 12.6% based on parameters listed. Current lipid treatment guidelines recommend at least moderate-intensity statin doses for all patients with diabetes to decrease overall ASCVD risk. Patient currently qualifies for a moderate intensity statin therapy based on current recommendations and is currently taking a high intensity statin.      Nutrition/Lifestyle Modifications:  - Educated pt on the importance of moderating carbohydrate intake. Reviewed sources of carbohydrates and method to help determine appropriate portion sizes (e.g., Diabetes Plate Method).  - Advised patient to avoid sugar-sweetened beverages and replace with water or diet/zero sugar option.  - Recommend ~30 minutes consistent, moderately intensive, exercise/day or ~150 minutes/week. Start small, stay consistent, and increase length and types of exercise, as tolerated.       Patient will return to clinic in 3 month(s) for follow up.        S/O: Ms. Alem Benites, a 53 y.o. female referred by Stephie Velasco MD,  has a past medical history of AR (allergic rhinitis), COVID-19, Eczema, Gestational diabetes, Hypertension, Type 2 diabetes mellitus without complication, without long-term current use of insulin ([facility]), and Vertigo.  Pt was seen today for diabetes management.  Patient's last A1c was:   Hemoglobin A1C   Date Value Ref Range Status   09/06/2024 6.8 (H) 4.8 - 5.6 % Final       Interim

## 2024-10-21 ENCOUNTER — PHARMACY VISIT (OUTPATIENT)
Facility: CLINIC | Age: 54
End: 2024-10-21

## 2024-10-21 DIAGNOSIS — E11.9 TYPE 2 DIABETES MELLITUS WITHOUT COMPLICATION, WITHOUT LONG-TERM CURRENT USE OF INSULIN (HCC): Primary | ICD-10-CM

## 2024-12-04 ENCOUNTER — HOSPITAL ENCOUNTER (OUTPATIENT)
Facility: HOSPITAL | Age: 54
Setting detail: SPECIMEN
Discharge: HOME | End: 2024-12-07

## 2024-12-04 LAB — SENTARA SPECIMEN COLLECTION: NORMAL

## 2024-12-04 PROCEDURE — 99001 SPECIMEN HANDLING PT-LAB: CPT

## 2024-12-05 LAB
A/G RATIO: 1.3 RATIO (ref 1.1–2.6)
ALBUMIN: 4.2 G/DL (ref 3.5–5)
ALP BLD-CCNC: 110 U/L (ref 25–115)
ALT SERPL-CCNC: 12 U/L (ref 5–40)
ANION GAP SERPL CALCULATED.3IONS-SCNC: 15 MMOL/L (ref 3–15)
AST SERPL-CCNC: 15 U/L (ref 10–37)
BILIRUB SERPL-MCNC: 0.2 MG/DL (ref 0.2–1.2)
BUN BLDV-MCNC: 13 MG/DL (ref 6–22)
CALCIUM SERPL-MCNC: 10 MG/DL (ref 8.4–10.5)
CHLORIDE BLD-SCNC: 98 MMOL/L (ref 98–110)
CHOLESTEROL, TOTAL: 229 MG/DL (ref 110–200)
CHOLESTEROL/HDL RATIO: 5.1 (ref 0–5)
CO2: 23 MMOL/L (ref 20–32)
CREAT SERPL-MCNC: 1 MG/DL (ref 0.5–1.2)
CREATININE URINE: 230 MG/DL
ESTIMATED AVERAGE GLUCOSE: 157 MG/DL (ref 91–123)
GFR, ESTIMATED: >60 ML/MIN/1.73 SQ.M.
GLOBULIN: 3.2 G/DL (ref 2–4)
GLUCOSE: 101 MG/DL (ref 70–99)
HBA1C MFR BLD: 7.1 % (ref 4.8–5.6)
HDLC SERPL-MCNC: 45 MG/DL
LDL CHOLESTEROL: 154 MG/DL (ref 50–99)
LDL/HDL RATIO: 3.4
MICROALB/CREAT RATIO (UG/MG CREAT.): 11.6 (ref 0–30)
MICROALBUMIN/CREAT 24H UR: 26.6 MG/L (ref 0.1–17)
NON-HDL CHOLESTEROL: 184 MG/DL
POTASSIUM SERPL-SCNC: 4.3 MMOL/L (ref 3.5–5.5)
SODIUM BLD-SCNC: 136 MMOL/L (ref 133–145)
TOTAL PROTEIN: 7.4 G/DL (ref 6.4–8.3)
TRIGL SERPL-MCNC: 146 MG/DL (ref 40–149)
VLDLC SERPL CALC-MCNC: 29 MG/DL (ref 8–30)

## 2024-12-08 NOTE — PROGRESS NOTES
ASSESSMENT/PLAN:  1. Type 2 diabetes with nephropathy (HCC)  Assessment & Plan:   Chronic, not at goal (unstable), continue current treatment plan and lifestyle modifications recommended  Orders:  -     Comprehensive Metabolic Panel; Future  -     Lipid Panel; Future  -     Hemoglobin A1C; Future  -     Microalbumin / Creatinine Urine Ratio; Future  2. Essential (primary) hypertension  Assessment & Plan:   Marked elevation today; will recheck in ofc.  Plan for home bp monitoring x 2 wks, then return for review of home log and med adjustment as indicated.   Orders:  -     Comprehensive Metabolic Panel; Future  -     Lipid Panel; Future  3. Hyperlipidemia associated with type 2 diabetes mellitus (HCC)  Assessment & Plan:   Chronic, worsening (exacerbation), pt has d/c'ed statin.  She is aware of risk of cva.  She feels she can work on healthy lifestyle changes.    4. Statin medication declined by patient  Assessment & Plan:   Pt aware of increased risk of stroke but does not want to take a statin.  She will work on this with healthy lifestyle.          Return in about 2 weeks (around 12/23/2024) for HTN; 3 months for dm, hld, htn, and lab review.       SUBJECTIVE/OBJECTIVE:    Chief Complaint   Patient presents with    Cholesterol Problem    Diabetes    Hypertension    Discuss Labs         HPI    Alem Benites is a 54 y.o. female presenting today for    3 months  follow up of dm, htn, hld.  Pt is doing ok overall but has been coping with the deaths of 3 very close friends in the last couple of weeks.     Home bp readings have been up recently; pt feels this is related to using her flonase.  Pt did work last night and has not rested much since the end of her shift.    Patient had labs on 12/4/24.  Labs reviewed in detail with patient.  Pt has stopped her crestor because she felt it was causing her increased lft's as well as multiple other side effects.        Patient does not need medication refills today.      New

## 2024-12-09 ENCOUNTER — OFFICE VISIT (OUTPATIENT)
Facility: CLINIC | Age: 54
End: 2024-12-09
Payer: COMMERCIAL

## 2024-12-09 ENCOUNTER — TELEPHONE (OUTPATIENT)
Facility: CLINIC | Age: 54
End: 2024-12-09

## 2024-12-09 VITALS
SYSTOLIC BLOOD PRESSURE: 155 MMHG | OXYGEN SATURATION: 99 % | BODY MASS INDEX: 25.04 KG/M2 | TEMPERATURE: 97.6 F | RESPIRATION RATE: 16 BRPM | WEIGHT: 155.8 LBS | HEART RATE: 84 BPM | HEIGHT: 66 IN | DIASTOLIC BLOOD PRESSURE: 83 MMHG

## 2024-12-09 DIAGNOSIS — Z53.20 STATIN MEDICATION DECLINED BY PATIENT: ICD-10-CM

## 2024-12-09 DIAGNOSIS — E11.21 TYPE 2 DIABETES WITH NEPHROPATHY (HCC): Primary | ICD-10-CM

## 2024-12-09 DIAGNOSIS — I10 ESSENTIAL (PRIMARY) HYPERTENSION: Chronic | ICD-10-CM

## 2024-12-09 DIAGNOSIS — E78.5 HYPERLIPIDEMIA ASSOCIATED WITH TYPE 2 DIABETES MELLITUS (HCC): Chronic | ICD-10-CM

## 2024-12-09 DIAGNOSIS — E11.69 HYPERLIPIDEMIA ASSOCIATED WITH TYPE 2 DIABETES MELLITUS (HCC): Chronic | ICD-10-CM

## 2024-12-09 DIAGNOSIS — E11.21 TYPE 2 DIABETES WITH NEPHROPATHY (HCC): ICD-10-CM

## 2024-12-09 PROCEDURE — 3077F SYST BP >= 140 MM HG: CPT | Performed by: FAMILY MEDICINE

## 2024-12-09 PROCEDURE — 99214 OFFICE O/P EST MOD 30 MIN: CPT | Performed by: FAMILY MEDICINE

## 2024-12-09 PROCEDURE — 3079F DIAST BP 80-89 MM HG: CPT | Performed by: FAMILY MEDICINE

## 2024-12-09 PROCEDURE — 3051F HG A1C>EQUAL 7.0%<8.0%: CPT | Performed by: FAMILY MEDICINE

## 2024-12-09 SDOH — ECONOMIC STABILITY: FOOD INSECURITY: WITHIN THE PAST 12 MONTHS, THE FOOD YOU BOUGHT JUST DIDN'T LAST AND YOU DIDN'T HAVE MONEY TO GET MORE.: NEVER TRUE

## 2024-12-09 SDOH — ECONOMIC STABILITY: FOOD INSECURITY: WITHIN THE PAST 12 MONTHS, YOU WORRIED THAT YOUR FOOD WOULD RUN OUT BEFORE YOU GOT MONEY TO BUY MORE.: NEVER TRUE

## 2024-12-09 SDOH — ECONOMIC STABILITY: INCOME INSECURITY: HOW HARD IS IT FOR YOU TO PAY FOR THE VERY BASICS LIKE FOOD, HOUSING, MEDICAL CARE, AND HEATING?: NOT HARD AT ALL

## 2024-12-09 ASSESSMENT — PATIENT HEALTH QUESTIONNAIRE - PHQ9
SUM OF ALL RESPONSES TO PHQ QUESTIONS 1-9: 0
1. LITTLE INTEREST OR PLEASURE IN DOING THINGS: NOT AT ALL
SUM OF ALL RESPONSES TO PHQ9 QUESTIONS 1 & 2: 0
SUM OF ALL RESPONSES TO PHQ QUESTIONS 1-9: 0
2. FEELING DOWN, DEPRESSED OR HOPELESS: NOT AT ALL

## 2024-12-09 NOTE — ASSESSMENT & PLAN NOTE
Pt aware of increased risk of stroke but does not want to take a statin.  She will work on this with healthy lifestyle.

## 2024-12-09 NOTE — ASSESSMENT & PLAN NOTE
Chronic, worsening (exacerbation), pt has d/c'ed statin.  She is aware of risk of cva.  She feels she can work on healthy lifestyle changes.

## 2024-12-09 NOTE — PROGRESS NOTES
Alem Benites presents today for   Chief Complaint   Patient presents with    Cholesterol Problem    Diabetes    Hypertension    Discuss Labs       Is someone accompanying this pt? no    Is the patient using any DME equipment during OV? no    Depression Screenin/9/2024    11:55 AM 2024    11:26 AM 3/12/2024    12:17 PM 2023    11:51 AM 2023    12:04 PM 2023     8:58 AM 3/13/2023    11:31 AM   PHQ-9 Questionaire   Little interest or pleasure in doing things 0 0 0 0 0 0 0   Feeling down, depressed, or hopeless 0 0 0 0 0 0 0   PHQ-9 Total Score 0 0 0 0 0 0 0        CHRISTOPHER 7-Anxiety        No data to display                   Learning Assessment:  No question data found.     Fall Risk       No data to display                   Travel Screening:    Travel Screening     No screening recorded since 24 0000       Travel History   Travel since 24    No documented travel since 24            Health Maintenance reviewed and discussed and ordered per Provider.  Transportation Needs: Unknown (2024)    PRAPARE - Transportation     Lack of Transportation (Medical): Not on file     Lack of Transportation (Non-Medical): No      Food Insecurity: No Food Insecurity (2024)    Hunger Vital Sign     Worried About Running Out of Food in the Last Year: Never true     Ran Out of Food in the Last Year: Never true     Financial Resource Strain: Low Risk  (2024)    Overall Financial Resource Strain (CARDIA)     Difficulty of Paying Living Expenses: Not hard at all     Housing Stability: Unknown (2024)    Housing Stability Vital Sign     Unable to Pay for Housing in the Last Year: Not on file     Number of Times Moved in the Last Year: Not on file     Homeless in the Last Year: No       Did you provide resources if patient requested them? Yespatient declinbed       Health Maintenance Due   Topic Date Due    Pneumococcal 0-64 years Vaccine (1 of 2 - PCV) Never done    HIV screen

## 2024-12-09 NOTE — TELEPHONE ENCOUNTER
System would not allow me to schedule a nurse visit  for pressure check for pt  Passing message to Alisha   2.76

## 2024-12-09 NOTE — ASSESSMENT & PLAN NOTE
Marked elevation today; will recheck in ofc.  Plan for home bp monitoring x 2 wks, then return for review of home log and med adjustment as indicated.

## 2024-12-10 DIAGNOSIS — R06.2 WHEEZING: Primary | ICD-10-CM

## 2024-12-10 RX ORDER — ALBUTEROL SULFATE 90 UG/1
INHALANT RESPIRATORY (INHALATION)
Qty: 18 EACH | Refills: 12 | Status: SHIPPED | OUTPATIENT
Start: 2024-12-10

## 2024-12-10 NOTE — TELEPHONE ENCOUNTER
Last seen 12/9/2024   Last labs   Last filled  10/25/23  Next appointment 12/24/2024     Lab Results   Component Value Date     12/04/2024    K 4.3 12/04/2024    CL 98 12/04/2024    CO2 23 12/04/2024    BUN 13 12/04/2024    CREATININE 1.0 12/04/2024    GLUCOSE 101 (H) 12/04/2024    CALCIUM 10.0 12/04/2024    BILITOT 0.2 12/04/2024    ALKPHOS 110 12/04/2024    AST 15 12/04/2024    ALT 12 12/04/2024    LABGLOM >60.0 12/04/2024    GFRAA >60 01/23/2022    AGRATIO 1.3 12/04/2024    GLOB 3.2 12/04/2024

## 2024-12-24 ENCOUNTER — OFFICE VISIT (OUTPATIENT)
Facility: CLINIC | Age: 54
End: 2024-12-24

## 2024-12-24 VITALS — SYSTOLIC BLOOD PRESSURE: 154 MMHG | DIASTOLIC BLOOD PRESSURE: 62 MMHG

## 2024-12-24 DIAGNOSIS — I10 ESSENTIAL (PRIMARY) HYPERTENSION: Primary | ICD-10-CM

## 2024-12-24 NOTE — PROGRESS NOTES
Alem Benites is being seen today for a Blood Pressure Recheck.     Alem Benites was seen 12/9/2024 and her Blood Pressure was:   BP Readings from Last 1 Encounters:   12/24/24 (!) 154/62           Jhonathan Arias MA

## 2025-01-05 DIAGNOSIS — R42 VERTIGO: ICD-10-CM

## 2025-01-05 DIAGNOSIS — L30.9 DERMATITIS: ICD-10-CM

## 2025-01-05 DIAGNOSIS — I10 ESSENTIAL (PRIMARY) HYPERTENSION: ICD-10-CM

## 2025-01-06 NOTE — TELEPHONE ENCOUNTER
Last seen 12/24/2024   Last labs   Last filled  HCTZ 3/12/24, 6/13/23 Zofran, Kenalog 3/21/24   Next appointment 4/9/2025     Lab Results   Component Value Date     12/04/2024    K 4.3 12/04/2024    CL 98 12/04/2024    CO2 23 12/04/2024    BUN 13 12/04/2024    CREATININE 1.0 12/04/2024    GLUCOSE 101 (H) 12/04/2024    CALCIUM 10.0 12/04/2024    BILITOT 0.2 12/04/2024    ALKPHOS 110 12/04/2024    AST 15 12/04/2024    ALT 12 12/04/2024    LABGLOM >60.0 12/04/2024    GFRAA >60 01/23/2022    AGRATIO 1.3 12/04/2024    GLOB 3.2 12/04/2024

## 2025-01-07 RX ORDER — TRIAMCINOLONE ACETONIDE 1 MG/G
CREAM TOPICAL
Qty: 30 G | Refills: 5 | Status: SHIPPED | OUTPATIENT
Start: 2025-01-07

## 2025-01-07 RX ORDER — HYDROCHLOROTHIAZIDE 25 MG/1
TABLET ORAL
Qty: 90 TABLET | Refills: 3 | Status: SHIPPED | OUTPATIENT
Start: 2025-01-07

## 2025-01-07 RX ORDER — ONDANSETRON 8 MG/1
8 TABLET, ORALLY DISINTEGRATING ORAL EVERY 8 HOURS PRN
Qty: 18 TABLET | Refills: 3 | Status: SHIPPED | OUTPATIENT
Start: 2025-01-07

## 2025-01-13 NOTE — PROGRESS NOTES
modification(s):  - as above    Medication Adherence/Access:  - Endorses adherence to current regimen?: Yes    Vitals/Labs:  No results found for: \"HBA1C\"  Hemoglobin A1C   Date Value Ref Range Status   12/04/2024 7.1 (H) 4.8 - 5.6 % Final   09/06/2024 6.8 (H) 4.8 - 5.6 % Final   06/05/2024 7.0 (H) 4.8 - 5.6 % Final       Screenings/Prevention Parameters:  -Diabetic Eye and Foot Exams:      Diabetes Management   Topic Date Due    Diabetic retinal exam  03/25/2021     -Microalbumin / Creatinine ratio:     No results found for: \"MICROALBUR\", \"LABCREA\"     Lab Results   Component Value Date    ALBCREAT 72.8 (H) 12/08/2022    MALBCR 11.6 12/04/2024    MALBCR  09/06/2024      Comment:      Unable to calculate Microablumin/Creatinine Ratio        MALBCR  06/05/2024      Comment:      Unable to calculate Microablumin/Creatinine Ratio          No components found for: \"MALBCREARAT\"  -Immunizations:      Immunization History   Administered Date(s) Administered    Influenza Virus Vaccine 10/14/2012, 10/16/2015, 10/16/2018, 10/16/2019    Influenza, FLUARIX, FLULAVAL, FLUZONE (age 6 mo+) and AFLURIA, (age 3 y+), Quadv PF, 0.5mL 09/29/2020, 10/01/2020       Additional Laboratory Parameters of Interest:   Estimation of renal function:  Lab Results   Component Value Date/Time    LABGLOM >60.0 12/04/2024 09:26 AM    LABGLOM 57.1 09/06/2024 10:12 AM    LABGLOM >60.0 06/05/2024 09:45 AM    LABGLOM >60.0 03/08/2024 09:49 AM    LABGLOM >60.0 12/06/2023 12:02 PM    LABGLOM >60.0 09/12/2023 09:32 AM    LABGLOM >60.0 12/08/2022 11:50 AM    GFRAA >60 01/23/2022 10:53 AM    GFRAA >60.0 01/20/2022 08:05 AM    GFRAA >60.0 10/26/2021 10:31 AM     Wt Readings from Last 3 Encounters:   12/09/24 70.7 kg (155 lb 12.8 oz)   09/09/24 72.6 kg (160 lb)   06/07/24 75.8 kg (167 lb)     Ht Readings from Last 1 Encounters:   12/09/24 1.676 m (5' 6\")     Calculated estimated creatinine clearance: CrCl cannot be calculated (Unknown ideal weight.).    Vital

## 2025-01-20 ENCOUNTER — PHARMACY VISIT (OUTPATIENT)
Facility: CLINIC | Age: 55
End: 2025-01-20

## 2025-01-20 DIAGNOSIS — E11.9 TYPE 2 DIABETES MELLITUS WITHOUT COMPLICATION, WITHOUT LONG-TERM CURRENT USE OF INSULIN (HCC): Primary | ICD-10-CM

## 2025-04-07 DIAGNOSIS — M25.512 CHRONIC LEFT SHOULDER PAIN: ICD-10-CM

## 2025-04-07 DIAGNOSIS — G89.29 CHRONIC LEFT SHOULDER PAIN: ICD-10-CM

## 2025-04-07 DIAGNOSIS — J30.1 ALLERGIC RHINITIS DUE TO POLLEN: ICD-10-CM

## 2025-04-07 RX ORDER — FLUTICASONE PROPIONATE 50 MCG
2 SPRAY, SUSPENSION (ML) NASAL DAILY
Qty: 48 ML | Refills: 4 | Status: SHIPPED | OUTPATIENT
Start: 2025-04-07

## 2025-04-07 RX ORDER — NAPROXEN 500 MG/1
500 TABLET ORAL 2 TIMES DAILY WITH MEALS
Qty: 60 TABLET | Refills: 12 | Status: SHIPPED | OUTPATIENT
Start: 2025-04-07

## 2025-04-07 SDOH — ECONOMIC STABILITY: FOOD INSECURITY: WITHIN THE PAST 12 MONTHS, THE FOOD YOU BOUGHT JUST DIDN'T LAST AND YOU DIDN'T HAVE MONEY TO GET MORE.: NEVER TRUE

## 2025-04-07 SDOH — ECONOMIC STABILITY: TRANSPORTATION INSECURITY
IN THE PAST 12 MONTHS, HAS LACK OF TRANSPORTATION KEPT YOU FROM MEETINGS, WORK, OR FROM GETTING THINGS NEEDED FOR DAILY LIVING?: NO

## 2025-04-07 SDOH — ECONOMIC STABILITY: INCOME INSECURITY: IN THE LAST 12 MONTHS, WAS THERE A TIME WHEN YOU WERE NOT ABLE TO PAY THE MORTGAGE OR RENT ON TIME?: NO

## 2025-04-07 SDOH — ECONOMIC STABILITY: TRANSPORTATION INSECURITY
IN THE PAST 12 MONTHS, HAS THE LACK OF TRANSPORTATION KEPT YOU FROM MEDICAL APPOINTMENTS OR FROM GETTING MEDICATIONS?: NO

## 2025-04-07 SDOH — ECONOMIC STABILITY: FOOD INSECURITY: WITHIN THE PAST 12 MONTHS, YOU WORRIED THAT YOUR FOOD WOULD RUN OUT BEFORE YOU GOT MONEY TO BUY MORE.: NEVER TRUE

## 2025-04-07 NOTE — TELEPHONE ENCOUNTER
Labs:   Lab Results   Component Value Date     12/04/2024    K 4.3 12/04/2024    CL 98 12/04/2024    CO2 23 12/04/2024    BUN 13 12/04/2024    CREATININE 1.0 12/04/2024    GLUCOSE 101 (H) 12/04/2024    CALCIUM 10.0 12/04/2024    BILITOT 0.2 12/04/2024    ALKPHOS 110 12/04/2024    AST 15 12/04/2024    ALT 12 12/04/2024    LABGLOM >60.0 12/04/2024    GFRAA >60 01/23/2022    AGRATIO 1.3 12/04/2024    GLOB 3.2 12/04/2024        Additional Notes:

## 2025-04-08 ENCOUNTER — HOSPITAL ENCOUNTER (OUTPATIENT)
Facility: HOSPITAL | Age: 55
Setting detail: SPECIMEN
Discharge: HOME | End: 2025-04-11

## 2025-04-08 LAB — SENTARA SPECIMEN COLLECTION: NORMAL

## 2025-04-08 PROCEDURE — 99001 SPECIMEN HANDLING PT-LAB: CPT

## 2025-04-09 ENCOUNTER — OFFICE VISIT (OUTPATIENT)
Facility: CLINIC | Age: 55
End: 2025-04-09
Payer: COMMERCIAL

## 2025-04-09 VITALS
BODY MASS INDEX: 24.11 KG/M2 | HEART RATE: 57 BPM | WEIGHT: 150 LBS | DIASTOLIC BLOOD PRESSURE: 81 MMHG | RESPIRATION RATE: 16 BRPM | SYSTOLIC BLOOD PRESSURE: 157 MMHG | TEMPERATURE: 97.6 F | HEIGHT: 66 IN | OXYGEN SATURATION: 98 %

## 2025-04-09 DIAGNOSIS — I10 ESSENTIAL (PRIMARY) HYPERTENSION: Chronic | ICD-10-CM

## 2025-04-09 DIAGNOSIS — E11.65 TYPE 2 DIABETES MELLITUS WITH HYPERGLYCEMIA, WITHOUT LONG-TERM CURRENT USE OF INSULIN (HCC): Primary | Chronic | ICD-10-CM

## 2025-04-09 DIAGNOSIS — E11.69 HYPERLIPIDEMIA ASSOCIATED WITH TYPE 2 DIABETES MELLITUS: Chronic | ICD-10-CM

## 2025-04-09 DIAGNOSIS — E78.5 HYPERLIPIDEMIA ASSOCIATED WITH TYPE 2 DIABETES MELLITUS: Chronic | ICD-10-CM

## 2025-04-09 LAB
A/G RATIO: 1.6 RATIO (ref 1.1–2.6)
ALBUMIN: 4.2 G/DL (ref 3.5–5)
ALP BLD-CCNC: 91 U/L (ref 25–115)
ALT SERPL-CCNC: 14 U/L (ref 5–40)
ANION GAP SERPL CALCULATED.3IONS-SCNC: 12 MMOL/L (ref 3–15)
AST SERPL-CCNC: 16 U/L (ref 10–37)
BILIRUB SERPL-MCNC: 0.1 MG/DL (ref 0.2–1.2)
BUN BLDV-MCNC: 20 MG/DL (ref 6–22)
CALCIUM SERPL-MCNC: 9.7 MG/DL (ref 8.4–10.5)
CHLORIDE BLD-SCNC: 101 MMOL/L (ref 98–110)
CHOLESTEROL, TOTAL: 218 MG/DL (ref 110–200)
CHOLESTEROL/HDL RATIO: 3.6 (ref 0–5)
CO2: 24 MMOL/L (ref 20–32)
CREAT SERPL-MCNC: 1 MG/DL (ref 0.5–1.2)
ESTIMATED AVERAGE GLUCOSE: 133 MG/DL (ref 91–123)
GFR, ESTIMATED: >60 ML/MIN/1.73 SQ.M.
GLOBULIN: 2.6 G/DL (ref 2–4)
GLUCOSE: 92 MG/DL (ref 70–99)
HBA1C MFR BLD: 6.3 % (ref 4.8–5.6)
HDLC SERPL-MCNC: 61 MG/DL
LDL CHOLESTEROL: 136 MG/DL (ref 50–99)
LDL/HDL RATIO: 2.2
NON-HDL CHOLESTEROL: 157 MG/DL
POTASSIUM SERPL-SCNC: 3.8 MMOL/L (ref 3.5–5.5)
SODIUM BLD-SCNC: 137 MMOL/L (ref 133–145)
TOTAL PROTEIN: 6.8 G/DL (ref 6.4–8.3)
TRIGL SERPL-MCNC: 104 MG/DL (ref 40–149)
VLDLC SERPL CALC-MCNC: 21 MG/DL (ref 8–30)

## 2025-04-09 PROCEDURE — 3079F DIAST BP 80-89 MM HG: CPT | Performed by: FAMILY MEDICINE

## 2025-04-09 PROCEDURE — 99214 OFFICE O/P EST MOD 30 MIN: CPT | Performed by: FAMILY MEDICINE

## 2025-04-09 PROCEDURE — 3044F HG A1C LEVEL LT 7.0%: CPT | Performed by: FAMILY MEDICINE

## 2025-04-09 PROCEDURE — 3077F SYST BP >= 140 MM HG: CPT | Performed by: FAMILY MEDICINE

## 2025-04-09 ASSESSMENT — PATIENT HEALTH QUESTIONNAIRE - PHQ9
SUM OF ALL RESPONSES TO PHQ QUESTIONS 1-9: 0
2. FEELING DOWN, DEPRESSED OR HOPELESS: NOT AT ALL
SUM OF ALL RESPONSES TO PHQ QUESTIONS 1-9: 0
1. LITTLE INTEREST OR PLEASURE IN DOING THINGS: NOT AT ALL
SUM OF ALL RESPONSES TO PHQ QUESTIONS 1-9: 0
SUM OF ALL RESPONSES TO PHQ QUESTIONS 1-9: 0

## 2025-04-09 ASSESSMENT — ENCOUNTER SYMPTOMS: RESPIRATORY NEGATIVE: 1

## 2025-04-09 NOTE — PROGRESS NOTES
ASSESSMENT/PLAN:  1. Type 2 diabetes mellitus with hyperglycemia, without long-term current use of insulin (HCC)  Assessment & Plan:   Chronic, at goal (stable), continue current treatment plan  2. Essential (primary) hypertension  Assessment & Plan:    Elevated today, normotensive per home readings.  Recommend NV for recheck in 1-2 wks  3. Hyperlipidemia associated with type 2 diabetes mellitus  Assessment & Plan:  Improving; cont pres tx plan.         Return in about 3 months (around 7/9/2025) for DM, HTN, HLD, (no labs).      SUBJECTIVE/OBJECTIVE:    Chief Complaint   Patient presents with    Hypertension    Diabetes    Hyperlipidemia         HPI    Alem Benites is a 54 y.o. female presenting today for delayed  follow up of dm, hld, htn.  Home bp readings are normotensive.      Patient had labs on 4/8/25.  Labs reviewed in detail with patient     Patient does not need medication refills today.      New concerns today: none        Review of Systems   Constitutional: Negative.    HENT: Negative.     Respiratory: Negative.     Cardiovascular: Negative.    All other systems reviewed and are negative.      Physical Exam  Vitals and nursing note reviewed.   Constitutional:       General: She is not in acute distress.     Appearance: Normal appearance.   HENT:      Head: Normocephalic and atraumatic.      Right Ear: External ear normal.      Left Ear: External ear normal.      Nose: Nose normal.      Mouth/Throat:      Mouth: Mucous membranes are moist.   Eyes:      Extraocular Movements: Extraocular movements intact.      Conjunctiva/sclera: Conjunctivae normal.   Cardiovascular:      Rate and Rhythm: Normal rate and regular rhythm.      Heart sounds: No murmur heard.     No friction rub. No gallop.   Pulmonary:      Effort: Pulmonary effort is normal.      Breath sounds: Normal breath sounds. No wheezing, rhonchi or rales.   Musculoskeletal:         General: Normal range of motion.      Cervical back: Normal 
   DTaP/Tdap/Td vaccine (1 - Tdap) Never done    Pneumococcal 50+ years Vaccine (1 of 2 - PCV) Never done    Shingles vaccine (1 of 2) Never done    Diabetic retinal exam  03/25/2021    Breast cancer screen  03/21/2024    COVID-19 Vaccine (1 - 2024-25 season) Never done    Diabetic foot exam  03/08/2025   .        \"Have you been to the ER, urgent care clinic since your last visit?  Hospitalized since your last visit?\"    NO    “Have you seen or consulted any other health care providers outside of [facility] since your last visit?”    NO    Have you had a mammogram?”   NO    Date of last Mammogram: 3/21/2023             Click Here for Release of Records Request

## 2025-04-10 LAB
CREATININE, URINE  MG/DL: 151 MG/DL
MICROALBUMIN/CREAT 24H UR: <12 MG/L (ref 0.1–17)
MICROALBUMIN/CREAT UR-RTO: NORMAL

## 2025-04-18 NOTE — PROGRESS NOTES
Pharmacy Progress Note - Diabetes Management       Assessment / Plan:   Diabetes Management:  Per ADA guidelines, Pt's A1c is at goal of < 7%.  Pt's AGP report from the past 90 days shows time in target range 98%, average glucose 117 mg/dL, and GMI 6.1%.  Pt's glycemic control is maintained on her current tx.  Encouraged to maintain her current diet.  Will maintain her current tx and will reassess with CGM data in 6 months.     Patient will return to clinic in 6 month(s) for follow up.        S/O: Ms. Alem Benites, a 54 y.o. female referred by Stephie Velasco MD,  has a past medical history of AR (allergic rhinitis), COVID-19, Eczema, Gestational diabetes, Hypertension, Type 2 diabetes mellitus without complication, without long-term current use of insulin, and Vertigo.  Pt was seen today for diabetes management.  Patient's last A1c was:   Hemoglobin A1C   Date Value Ref Range Status   04/08/2025 6.3 (H) 4.8 - 5.6 % Final       Interim update: Pt was last seen by me on 1/20/2025.  Per my prior note: Pt's A1c is not at goal of < 7%.  Prior to the A1c drawn in December, her diet was very poor and she had missed some doses of her Mounjaro.  Since then, her BG values have improved.  Her AGP report from the past 90 days shows time in target range 98%, average glucose 124 mg/dL, and GMI 6.3% which has further improved from the AGP report from the past 14 days shows time in target range 99%, average glucose 114 mg/dL, and GMI 6%.  Encouraged to maintain her current diet to improve her A1c levels.  Will not consider restart of metformin d/t recurrent hypoglycemia.  Will not consider restart of Jardiance d/t recurrent yeast infections.  Will reassess with CGM data in 3 months.     Today:   Pt states that she is tolerating her current dose of Mounjaro.  She denies any n/v/c/d or acid reflux.  She has maintained her diabetic diet.  She denies changes in physical activity.      Current anti-hyperglycemic regimen includes:

## 2025-04-21 ENCOUNTER — PHARMACY VISIT (OUTPATIENT)
Facility: CLINIC | Age: 55
End: 2025-04-21

## 2025-04-21 DIAGNOSIS — E11.9 TYPE 2 DIABETES MELLITUS WITHOUT COMPLICATION, WITHOUT LONG-TERM CURRENT USE OF INSULIN (HCC): Primary | ICD-10-CM

## 2025-04-21 RX ORDER — TIRZEPATIDE 15 MG/.5ML
INJECTION, SOLUTION SUBCUTANEOUS
COMMUNITY
Start: 2025-04-08

## 2025-06-30 ENCOUNTER — HOSPITAL ENCOUNTER (OUTPATIENT)
Age: 55
Discharge: HOME | End: 2025-07-03

## 2025-06-30 LAB
ESTIMATED AVERAGE GLUCOSE: 132 MG/DL (ref 91–123)
HBA1C MFR BLD: 6.2 % (ref 4.8–5.6)
SENTARA SPECIMEN COLLECTION: NORMAL

## 2025-07-01 LAB
A/G RATIO: 1.5 RATIO (ref 1.1–2.6)
ALBUMIN: 4.2 G/DL (ref 3.5–5)
ALP BLD-CCNC: 118 U/L (ref 25–115)
ALT SERPL-CCNC: 11 U/L (ref 5–40)
ANION GAP SERPL CALCULATED.3IONS-SCNC: 10 MMOL/L (ref 3–15)
AST SERPL-CCNC: 16 U/L (ref 10–37)
BILIRUB SERPL-MCNC: 0.2 MG/DL (ref 0.2–1.2)
BUN BLDV-MCNC: 18 MG/DL (ref 6–22)
CALCIUM SERPL-MCNC: 9.9 MG/DL (ref 8.4–10.5)
CHLORIDE BLD-SCNC: 98 MMOL/L (ref 98–110)
CHOLESTEROL, TOTAL: 226 MG/DL (ref 110–200)
CHOLESTEROL/HDL RATIO: 3.8 (ref 0–5)
CO2: 25 MMOL/L (ref 20–32)
CREAT SERPL-MCNC: 1.1 MG/DL (ref 0.5–1.2)
CREATININE, URINE  MG/DL: 69 MG/DL
GFR, ESTIMATED: 56.7 ML/MIN/1.73 SQ.M.
GLOBULIN: 2.8 G/DL (ref 2–4)
GLUCOSE: 110 MG/DL (ref 70–99)
HDLC SERPL-MCNC: 59 MG/DL
LDL CHOLESTEROL: 151 MG/DL (ref 50–99)
LDL/HDL RATIO: 2.6
MICROALBUMIN/CREAT 24H UR: <12 MG/L (ref 0.1–17)
MICROALBUMIN/CREAT UR-RTO: NORMAL
NON-HDL CHOLESTEROL: 167 MG/DL
POTASSIUM SERPL-SCNC: 3.7 MMOL/L (ref 3.5–5.5)
SODIUM BLD-SCNC: 133 MMOL/L (ref 133–145)
TOTAL PROTEIN: 7 G/DL (ref 6.4–8.3)
TRIGL SERPL-MCNC: 76 MG/DL (ref 40–149)
VLDLC SERPL CALC-MCNC: 15 MG/DL (ref 8–30)

## 2025-07-08 ASSESSMENT — ENCOUNTER SYMPTOMS: RESPIRATORY NEGATIVE: 1

## 2025-07-09 ENCOUNTER — OFFICE VISIT (OUTPATIENT)
Facility: CLINIC | Age: 55
End: 2025-07-09
Payer: COMMERCIAL

## 2025-07-09 VITALS
SYSTOLIC BLOOD PRESSURE: 128 MMHG | HEIGHT: 66 IN | WEIGHT: 150 LBS | DIASTOLIC BLOOD PRESSURE: 71 MMHG | TEMPERATURE: 97.2 F | RESPIRATION RATE: 14 BRPM | HEART RATE: 75 BPM | BODY MASS INDEX: 24.11 KG/M2 | OXYGEN SATURATION: 100 %

## 2025-07-09 DIAGNOSIS — E11.21 TYPE 2 DIABETES WITH NEPHROPATHY (HCC): ICD-10-CM

## 2025-07-09 DIAGNOSIS — Z53.20 STATIN MEDICATION DECLINED BY PATIENT: ICD-10-CM

## 2025-07-09 DIAGNOSIS — I10 ESSENTIAL (PRIMARY) HYPERTENSION: Chronic | ICD-10-CM

## 2025-07-09 DIAGNOSIS — E78.5 HYPERLIPIDEMIA ASSOCIATED WITH TYPE 2 DIABETES MELLITUS (HCC): Chronic | ICD-10-CM

## 2025-07-09 DIAGNOSIS — E11.21 TYPE 2 DIABETES WITH NEPHROPATHY (HCC): Primary | ICD-10-CM

## 2025-07-09 DIAGNOSIS — E11.69 HYPERLIPIDEMIA ASSOCIATED WITH TYPE 2 DIABETES MELLITUS (HCC): Chronic | ICD-10-CM

## 2025-07-09 PROCEDURE — 99214 OFFICE O/P EST MOD 30 MIN: CPT | Performed by: FAMILY MEDICINE

## 2025-07-09 PROCEDURE — 3078F DIAST BP <80 MM HG: CPT | Performed by: FAMILY MEDICINE

## 2025-07-09 PROCEDURE — 3044F HG A1C LEVEL LT 7.0%: CPT | Performed by: FAMILY MEDICINE

## 2025-07-09 PROCEDURE — 3074F SYST BP LT 130 MM HG: CPT | Performed by: FAMILY MEDICINE

## 2025-07-09 SDOH — ECONOMIC STABILITY: FOOD INSECURITY: WITHIN THE PAST 12 MONTHS, YOU WORRIED THAT YOUR FOOD WOULD RUN OUT BEFORE YOU GOT MONEY TO BUY MORE.: NEVER TRUE

## 2025-07-09 SDOH — ECONOMIC STABILITY: FOOD INSECURITY: WITHIN THE PAST 12 MONTHS, THE FOOD YOU BOUGHT JUST DIDN'T LAST AND YOU DIDN'T HAVE MONEY TO GET MORE.: NEVER TRUE

## 2025-07-09 ASSESSMENT — PATIENT HEALTH QUESTIONNAIRE - PHQ9
SUM OF ALL RESPONSES TO PHQ QUESTIONS 1-9: 0
1. LITTLE INTEREST OR PLEASURE IN DOING THINGS: NOT AT ALL
SUM OF ALL RESPONSES TO PHQ QUESTIONS 1-9: 0
2. FEELING DOWN, DEPRESSED OR HOPELESS: NOT AT ALL

## 2025-07-09 NOTE — PROGRESS NOTES
ASSESSMENT/PLAN:  1. Type 2 diabetes with nephropathy (HCC)  Assessment & Plan:   Chronic, at goal (stable), continue current treatment plan  Orders:  -     Comprehensive Metabolic Panel; Future  -     Lipid Panel; Future  -     Hemoglobin A1C; Future  -     Albumin/Creatinine Ratio, Urine; Future  2. Essential (primary) hypertension  Assessment & Plan:   Chronic, at goal (stable), continue current treatment plan  Orders:  -     Comprehensive Metabolic Panel; Future  -     Lipid Panel; Future  3. Hyperlipidemia associated with type 2 diabetes mellitus (HCC)  Assessment & Plan:   Chronic, worsening (exacerbation), pt declines statin.  Lifestyle modifications recommended  Orders:  -     Comprehensive Metabolic Panel; Future  -     Lipid Panel; Future  4. Statin medication declined by patient        Return in about 4 months (around 11/9/2025) for DM, HTN, HLD, lab review.      SUBJECTIVE/OBJECTIVE:    Chief Complaint   Patient presents with    Cholesterol Problem    Diabetes    Hypertension         HPI    Alem Benites is a 54 y.o. female presenting today for    3 months  follow up of DM, hypertension, hld.  Pt has been doing well overall.     Patient had labs on 6/30/25.  Labs reviewed in detail with patient       Patient does not need medication refills today.      New concerns today: none        Review of Systems   Constitutional: Negative.    HENT: Negative.     Respiratory: Negative.     Cardiovascular: Negative.    All other systems reviewed and are negative.      Physical Exam  Vitals and nursing note reviewed.   Constitutional:       General: She is not in acute distress.     Appearance: Normal appearance.   HENT:      Head: Normocephalic and atraumatic.      Right Ear: External ear normal.      Left Ear: External ear normal.      Nose: Nose normal.      Mouth/Throat:      Mouth: Mucous membranes are moist.   Eyes:      Extraocular Movements: Extraocular movements intact.      Conjunctiva/sclera:

## 2025-07-09 NOTE — PROGRESS NOTES
Kjjim Benites presents today for   Chief Complaint   Patient presents with    Cholesterol Problem    Diabetes    Hypertension       Is someone accompanying this pt? no    Is the patient using any DME equipment during OV? no    Depression Screenin/9/2025     1:30 PM 2025     8:40 AM 2024    12:29 PM 2024    11:55 AM 2024    11:26 AM 3/12/2024    12:17 PM 2023    11:51 AM   PHQ-9 Questionaire   Little interest or pleasure in doing things 0 0 0 0 0 0 0   Feeling down, depressed, or hopeless 0 0 0 0 0 0 0   PHQ-9 Total Score 0 0 0 0 0 0 0        CHRISTOPHER 7-Anxiety        No data to display                   Learning Assessment:  No question data found.     Fall Risk       No data to display                   Travel Screening:    Travel Screening       Question Response    Have you been in contact with someone who was sick? No / Unsure    Do you have any of the following new or worsening symptoms? None of these    Have you traveled internationally or domestically in the last month? No          Travel History   Travel since 25    No documented travel since 25            Health Maintenance reviewed and discussed and ordered per Provider.  Transportation Needs: No Transportation Needs (2025)    PRAPARE - Transportation     Lack of Transportation (Medical): No     Lack of Transportation (Non-Medical): No      Food Insecurity: No Food Insecurity (2025)    Hunger Vital Sign     Worried About Running Out of Food in the Last Year: Never true     Ran Out of Food in the Last Year: Never true     Financial Resource Strain: Low Risk  (2024)    Overall Financial Resource Strain (CARDIA)     Difficulty of Paying Living Expenses: Not hard at all     Housing Stability: Low Risk  (2025)    Housing Stability Vital Sign     Unable to Pay for Housing in the Last Year: No     Number of Times Moved in the Last Year: 0     Homeless in the Last Year: No       Did you provide resources

## (undated) DEVICE — STERILE POLYISOPRENE POWDER-FREE SURGICAL GLOVES: Brand: PROTEXIS

## (undated) DEVICE — PACK PROCEDURE SURG MAJ W/ BASIN LF

## (undated) DEVICE — SUTURE VCRL SZ 2-0 L36IN ABSRB UD L36MM CT-1 1/2 CIR J945H

## (undated) DEVICE — KIT CLN UP BON SECOURS MARYV

## (undated) DEVICE — DBD-DRAPE,LAPAROTOMY,PFANN,STERILE: Brand: MEDLINE

## (undated) DEVICE — DRAPE,REIN 53X77,STERILE: Brand: MEDLINE

## (undated) DEVICE — SUTURE VCRL SZ 2-0 L12X18IN ABSRB UD POLYGLACTIN 910 BRAID J911T

## (undated) DEVICE — SUTURE PLN GUT SZ 3-0 L27IN ABSRB YELLOWISH TAN L36MM CT-1 842H

## (undated) DEVICE — WATERPROOF, BACTERIA PROOF DRESSING WITH ABSORBENT SEE THROUGH PAD: Brand: OPSITE POST-OP VISIBLE 25X10CM CTN 20

## (undated) DEVICE — KENDALL SCD EXPRESS SLEEVES, KNEE LENGTH, MEDIUM: Brand: KENDALL SCD

## (undated) DEVICE — REM POLYHESIVE ADULT PATIENT RETURN ELECTRODE: Brand: VALLEYLAB

## (undated) DEVICE — SOFT SILICONE HYDROCELLULAR SACRUM DRESSING WITH LOCK AWAY LAYER: Brand: ALLEVYN LIFE SACRUM (LARGE) PACK OF 10

## (undated) DEVICE — SLIM BODY SKIN STAPLER: Brand: APPOSE ULC

## (undated) DEVICE — SUTURE MCRYL SZ 4-0 L27IN ABSRB UD L24MM PS-1 3/8 CIR PRIM Y935H

## (undated) DEVICE — TRAY CATH OD16FR SIL URIN M STATLOK STBL DEV SURSTP

## (undated) DEVICE — 3M™ BAIR PAWS FLEX™ WARMING GOWN, STANDARD, 20 PER CASE 81003: Brand: BAIR PAWS™

## (undated) DEVICE — SPONGE LAP 18X18IN STRL -- 5/PK

## (undated) DEVICE — SUTURE VCRL SZ 0 L18IN ABSRB UD POLYGLACTIN 910 BRAID TIE J912G

## (undated) DEVICE — SOLUTION IV 1000ML 0.9% SOD CHL

## (undated) DEVICE — DRAPE,T,LAPARO,TRANS,STERILE: Brand: MEDLINE

## (undated) DEVICE — SUTURE VCRL SZ 0 L36IN ABSRB UD L36MM CT-1 1/2 CIR J946H

## (undated) DEVICE — INTENDED FOR TISSUE SEPARATION, AND OTHER PROCEDURES THAT REQUIRE A SHARP SURGICAL BLADE TO PUNCTURE OR CUT.: Brand: BARD-PARKER SAFETY BLADES SIZE 10, STERILE

## (undated) DEVICE — FLEX ADVANTAGE 3000CC: Brand: FLEX ADVANTAGE